# Patient Record
Sex: FEMALE | Race: WHITE | NOT HISPANIC OR LATINO | Employment: OTHER | URBAN - METROPOLITAN AREA
[De-identification: names, ages, dates, MRNs, and addresses within clinical notes are randomized per-mention and may not be internally consistent; named-entity substitution may affect disease eponyms.]

---

## 2018-01-22 ENCOUNTER — LAB REQUISITION (OUTPATIENT)
Dept: LAB | Facility: HOSPITAL | Age: 83
End: 2018-01-22
Payer: COMMERCIAL

## 2018-01-22 DIAGNOSIS — H18.12 BULLOUS KERATOPATHY OF LEFT EYE: ICD-10-CM

## 2018-01-22 PROCEDURE — 87070 CULTURE OTHR SPECIMN AEROBIC: CPT | Performed by: OPHTHALMOLOGY

## 2018-01-22 PROCEDURE — 87205 SMEAR GRAM STAIN: CPT | Performed by: OPHTHALMOLOGY

## 2018-01-22 PROCEDURE — 87102 FUNGUS ISOLATION CULTURE: CPT | Performed by: OPHTHALMOLOGY

## 2018-01-22 PROCEDURE — 87075 CULTR BACTERIA EXCEPT BLOOD: CPT | Performed by: OPHTHALMOLOGY

## 2018-01-25 LAB
BACTERIA EYE AEROBE CULT: NO GROWTH
BACTERIA SPEC ANAEROBE CULT: NO GROWTH
GRAM STN SPEC: NORMAL

## 2018-02-26 LAB — FUNGUS SPEC CULT: NORMAL

## 2018-07-20 ENCOUNTER — APPOINTMENT (INPATIENT)
Dept: ULTRASOUND IMAGING | Facility: HOSPITAL | Age: 83
DRG: 684 | End: 2018-07-20
Payer: COMMERCIAL

## 2018-07-20 ENCOUNTER — HOSPITAL ENCOUNTER (INPATIENT)
Facility: HOSPITAL | Age: 83
LOS: 7 days | Discharge: NURSING, REHAB OR LONG-TERM CARE | DRG: 684 | End: 2018-07-27
Attending: EMERGENCY MEDICINE | Admitting: INTERNAL MEDICINE
Payer: COMMERCIAL

## 2018-07-20 DIAGNOSIS — L30.4 INTERTRIGO: ICD-10-CM

## 2018-07-20 DIAGNOSIS — H40.9 GLAUCOMA: ICD-10-CM

## 2018-07-20 DIAGNOSIS — Z86.69 HISTORY OF EYE INFLAMMATION: ICD-10-CM

## 2018-07-20 DIAGNOSIS — L89.322 DECUBITUS ULCER OF LEFT BUTTOCK, STAGE 2 (HCC): ICD-10-CM

## 2018-07-20 DIAGNOSIS — R78.81 POSITIVE BLOOD CULTURE: ICD-10-CM

## 2018-07-20 DIAGNOSIS — B37.2 CANDIDAL INTERTRIGO: ICD-10-CM

## 2018-07-20 DIAGNOSIS — N17.9 ACUTE RENAL FAILURE (ARF) (HCC): Primary | ICD-10-CM

## 2018-07-20 DIAGNOSIS — E11.42 TYPE 2 DIABETES MELLITUS WITH DIABETIC POLYNEUROPATHY, WITHOUT LONG-TERM CURRENT USE OF INSULIN (HCC): ICD-10-CM

## 2018-07-20 DIAGNOSIS — L89.90 PRESSURE SORE: ICD-10-CM

## 2018-07-20 DIAGNOSIS — K59.00 CONSTIPATION: ICD-10-CM

## 2018-07-20 DIAGNOSIS — R26.2 AMBULATORY DYSFUNCTION: ICD-10-CM

## 2018-07-20 PROBLEM — I10 ESSENTIAL HYPERTENSION: Status: ACTIVE | Noted: 2018-07-20

## 2018-07-20 PROBLEM — E11.69 DIABETES MELLITUS TYPE 2 IN OBESE (HCC): Status: ACTIVE | Noted: 2018-07-20

## 2018-07-20 PROBLEM — E66.01 MORBID OBESITY (HCC): Status: ACTIVE | Noted: 2018-07-20

## 2018-07-20 PROBLEM — L89.302 DECUBITUS ULCER OF BUTTOCK, STAGE 2 (HCC): Status: ACTIVE | Noted: 2018-07-20

## 2018-07-20 PROBLEM — E66.9 DIABETES MELLITUS TYPE 2 IN OBESE (HCC): Status: ACTIVE | Noted: 2018-07-20

## 2018-07-20 LAB
ALBUMIN SERPL BCP-MCNC: 2.8 G/DL (ref 3.5–5)
ALP SERPL-CCNC: 87 U/L (ref 46–116)
ALT SERPL W P-5'-P-CCNC: 15 U/L (ref 12–78)
ANION GAP SERPL CALCULATED.3IONS-SCNC: 7 MMOL/L (ref 4–13)
AST SERPL W P-5'-P-CCNC: 13 U/L (ref 5–45)
BACTERIA UR QL AUTO: ABNORMAL /HPF
BASOPHILS # BLD AUTO: 0.06 THOUSANDS/ΜL (ref 0–0.1)
BASOPHILS NFR BLD AUTO: 1 % (ref 0–1)
BILIRUB SERPL-MCNC: 0.4 MG/DL (ref 0.2–1)
BILIRUB UR QL STRIP: NEGATIVE
BUN SERPL-MCNC: 77 MG/DL (ref 5–25)
CALCIUM SERPL-MCNC: 9.7 MG/DL (ref 8.3–10.1)
CHLORIDE SERPL-SCNC: 94 MMOL/L (ref 100–108)
CLARITY UR: ABNORMAL
CO2 SERPL-SCNC: 30 MMOL/L (ref 21–32)
COLOR UR: YELLOW
CREAT SERPL-MCNC: 3.56 MG/DL (ref 0.6–1.3)
EOSINOPHIL # BLD AUTO: 0.3 THOUSAND/ΜL (ref 0–0.61)
EOSINOPHIL NFR BLD AUTO: 3 % (ref 0–6)
ERYTHROCYTE [DISTWIDTH] IN BLOOD BY AUTOMATED COUNT: 13.2 % (ref 11.6–15.1)
GFR SERPL CREATININE-BSD FRML MDRD: 11 ML/MIN/1.73SQ M
GLUCOSE SERPL-MCNC: 215 MG/DL (ref 65–140)
GLUCOSE SERPL-MCNC: 287 MG/DL (ref 65–140)
GLUCOSE UR STRIP-MCNC: NEGATIVE MG/DL
HCT VFR BLD AUTO: 32.1 % (ref 34.8–46.1)
HGB BLD-MCNC: 10.6 G/DL (ref 11.5–15.4)
HGB UR QL STRIP.AUTO: ABNORMAL
KETONES UR STRIP-MCNC: NEGATIVE MG/DL
LEUKOCYTE ESTERASE UR QL STRIP: ABNORMAL
LYMPHOCYTES # BLD AUTO: 1.05 THOUSANDS/ΜL (ref 0.6–4.47)
LYMPHOCYTES NFR BLD AUTO: 10 % (ref 14–44)
MCH RBC QN AUTO: 30 PG (ref 26.8–34.3)
MCHC RBC AUTO-ENTMCNC: 33 G/DL (ref 31.4–37.4)
MCV RBC AUTO: 91 FL (ref 82–98)
MONOCYTES # BLD AUTO: 0.9 THOUSAND/ΜL (ref 0.17–1.22)
MONOCYTES NFR BLD AUTO: 9 % (ref 4–12)
NEUTROPHILS # BLD AUTO: 8.31 THOUSANDS/ΜL (ref 1.85–7.62)
NEUTS SEG NFR BLD AUTO: 78 % (ref 43–75)
NITRITE UR QL STRIP: POSITIVE
NON-SQ EPI CELLS URNS QL MICRO: ABNORMAL /HPF
NT-PROBNP SERPL-MCNC: 576 PG/ML
PH UR STRIP.AUTO: 6 [PH] (ref 4.5–8)
PLATELET # BLD AUTO: 404 THOUSANDS/UL (ref 149–390)
PMV BLD AUTO: 9.2 FL (ref 8.9–12.7)
POTASSIUM SERPL-SCNC: 4.6 MMOL/L (ref 3.5–5.3)
PROT SERPL-MCNC: 7.4 G/DL (ref 6.4–8.2)
PROT UR STRIP-MCNC: NEGATIVE MG/DL
RBC # BLD AUTO: 3.53 MILLION/UL (ref 3.81–5.12)
RBC #/AREA URNS AUTO: ABNORMAL /HPF
SODIUM SERPL-SCNC: 131 MMOL/L (ref 136–145)
SP GR UR STRIP.AUTO: 1.01 (ref 1–1.03)
UROBILINOGEN UR QL STRIP.AUTO: 0.2 E.U./DL
WBC # BLD AUTO: 10.62 THOUSAND/UL (ref 4.31–10.16)
WBC #/AREA URNS AUTO: ABNORMAL /HPF

## 2018-07-20 PROCEDURE — 87147 CULTURE TYPE IMMUNOLOGIC: CPT | Performed by: EMERGENCY MEDICINE

## 2018-07-20 PROCEDURE — 80053 COMPREHEN METABOLIC PANEL: CPT | Performed by: EMERGENCY MEDICINE

## 2018-07-20 PROCEDURE — 87185 SC STD ENZYME DETCJ PER NZM: CPT | Performed by: EMERGENCY MEDICINE

## 2018-07-20 PROCEDURE — 81001 URINALYSIS AUTO W/SCOPE: CPT

## 2018-07-20 PROCEDURE — 76770 US EXAM ABDO BACK WALL COMP: CPT

## 2018-07-20 PROCEDURE — 99285 EMERGENCY DEPT VISIT HI MDM: CPT

## 2018-07-20 PROCEDURE — 85025 COMPLETE CBC W/AUTO DIFF WBC: CPT | Performed by: EMERGENCY MEDICINE

## 2018-07-20 PROCEDURE — 36415 COLL VENOUS BLD VENIPUNCTURE: CPT | Performed by: EMERGENCY MEDICINE

## 2018-07-20 PROCEDURE — 82948 REAGENT STRIP/BLOOD GLUCOSE: CPT

## 2018-07-20 PROCEDURE — 87040 BLOOD CULTURE FOR BACTERIA: CPT | Performed by: EMERGENCY MEDICINE

## 2018-07-20 PROCEDURE — 83880 ASSAY OF NATRIURETIC PEPTIDE: CPT | Performed by: EMERGENCY MEDICINE

## 2018-07-20 PROCEDURE — 87186 SC STD MICRODIL/AGAR DIL: CPT | Performed by: EMERGENCY MEDICINE

## 2018-07-20 PROCEDURE — 99223 1ST HOSP IP/OBS HIGH 75: CPT | Performed by: INTERNAL MEDICINE

## 2018-07-20 PROCEDURE — 87086 URINE CULTURE/COLONY COUNT: CPT

## 2018-07-20 RX ORDER — ACETAMINOPHEN 325 MG/1
650 TABLET ORAL EVERY 6 HOURS PRN
Status: DISCONTINUED | OUTPATIENT
Start: 2018-07-20 | End: 2018-07-27 | Stop reason: HOSPADM

## 2018-07-20 RX ORDER — TIMOLOL MALEATE 5 MG/ML
1 SOLUTION/ DROPS OPHTHALMIC DAILY
Status: DISCONTINUED | OUTPATIENT
Start: 2018-07-21 | End: 2018-07-27 | Stop reason: HOSPADM

## 2018-07-20 RX ORDER — METOPROLOL TARTRATE 50 MG/1
25 TABLET, FILM COATED ORAL EVERY 12 HOURS SCHEDULED
COMMUNITY

## 2018-07-20 RX ORDER — ASPIRIN 81 MG/1
81 TABLET ORAL DAILY
COMMUNITY
End: 2018-08-10 | Stop reason: HOSPADM

## 2018-07-20 RX ORDER — ASPIRIN 81 MG/1
81 TABLET ORAL DAILY
Status: DISCONTINUED | OUTPATIENT
Start: 2018-07-21 | End: 2018-07-27 | Stop reason: HOSPADM

## 2018-07-20 RX ORDER — POTASSIUM CHLORIDE 750 MG/1
10 TABLET, EXTENDED RELEASE ORAL DAILY
COMMUNITY
End: 2018-07-27 | Stop reason: HOSPADM

## 2018-07-20 RX ORDER — LEVOTHYROXINE SODIUM 0.07 MG/1
75 TABLET ORAL
Status: DISCONTINUED | OUTPATIENT
Start: 2018-07-21 | End: 2018-07-27 | Stop reason: HOSPADM

## 2018-07-20 RX ORDER — PRAVASTATIN SODIUM 40 MG
40 TABLET ORAL
Status: DISCONTINUED | OUTPATIENT
Start: 2018-07-20 | End: 2018-07-27 | Stop reason: HOSPADM

## 2018-07-20 RX ORDER — DOCUSATE SODIUM 100 MG/1
100 CAPSULE, LIQUID FILLED ORAL 2 TIMES DAILY PRN
Status: DISCONTINUED | OUTPATIENT
Start: 2018-07-20 | End: 2018-07-27 | Stop reason: HOSPADM

## 2018-07-20 RX ORDER — NYSTATIN 100000 U/G
CREAM TOPICAL ONCE
Status: COMPLETED | OUTPATIENT
Start: 2018-07-20 | End: 2018-07-20

## 2018-07-20 RX ORDER — FUROSEMIDE 40 MG/1
40 TABLET ORAL DAILY
COMMUNITY
End: 2018-07-27 | Stop reason: HOSPADM

## 2018-07-20 RX ORDER — BRIMONIDINE TARTRATE, TIMOLOL MALEATE 2; 5 MG/ML; MG/ML
1 SOLUTION/ DROPS OPHTHALMIC EVERY 12 HOURS SCHEDULED
COMMUNITY

## 2018-07-20 RX ORDER — ONDANSETRON 2 MG/ML
4 INJECTION INTRAMUSCULAR; INTRAVENOUS EVERY 6 HOURS PRN
Status: DISCONTINUED | OUTPATIENT
Start: 2018-07-20 | End: 2018-07-27 | Stop reason: HOSPADM

## 2018-07-20 RX ORDER — PREDNISOLONE ACETATE 10 MG/ML
1 SUSPENSION/ DROPS OPHTHALMIC 2 TIMES DAILY
Status: DISCONTINUED | OUTPATIENT
Start: 2018-07-20 | End: 2018-07-27 | Stop reason: HOSPADM

## 2018-07-20 RX ORDER — NYSTATIN 100000 [USP'U]/G
POWDER TOPICAL 2 TIMES DAILY
Status: DISCONTINUED | OUTPATIENT
Start: 2018-07-20 | End: 2018-07-27 | Stop reason: HOSPADM

## 2018-07-20 RX ORDER — MELATONIN
1000 DAILY
Status: DISCONTINUED | OUTPATIENT
Start: 2018-07-21 | End: 2018-07-27 | Stop reason: HOSPADM

## 2018-07-20 RX ORDER — QUINAPRIL HCL AND HYDROCHLOROTHIAZIDE 20; 12.5 MG/1; MG/1
1 TABLET ORAL DAILY
COMMUNITY
End: 2018-07-27 | Stop reason: HOSPADM

## 2018-07-20 RX ORDER — HYDROCODONE BITARTRATE AND ACETAMINOPHEN 5; 325 MG/1; MG/1
1 TABLET ORAL 2 TIMES DAILY PRN
Status: DISCONTINUED | OUTPATIENT
Start: 2018-07-20 | End: 2018-07-27 | Stop reason: HOSPADM

## 2018-07-20 RX ORDER — HYDROCODONE BITARTRATE AND ACETAMINOPHEN 5; 300 MG/1; MG/1
0.5 TABLET ORAL 2 TIMES DAILY PRN
Status: ON HOLD | COMMUNITY
End: 2018-07-27

## 2018-07-20 RX ORDER — LEVOTHYROXINE SODIUM 0.07 MG/1
100 TABLET ORAL DAILY
COMMUNITY

## 2018-07-20 RX ORDER — PREDNISOLONE ACETATE 10 MG/ML
SUSPENSION/ DROPS OPHTHALMIC
Status: ON HOLD | COMMUNITY
End: 2018-07-27

## 2018-07-20 RX ORDER — PRAVASTATIN SODIUM 40 MG
40 TABLET ORAL DAILY
COMMUNITY
End: 2020-08-27 | Stop reason: ALTCHOICE

## 2018-07-20 RX ORDER — SODIUM CHLORIDE 9 MG/ML
75 INJECTION, SOLUTION INTRAVENOUS CONTINUOUS
Status: DISCONTINUED | OUTPATIENT
Start: 2018-07-20 | End: 2018-07-21

## 2018-07-20 RX ORDER — HEPARIN SODIUM 5000 [USP'U]/ML
5000 INJECTION, SOLUTION INTRAVENOUS; SUBCUTANEOUS EVERY 8 HOURS SCHEDULED
Status: DISCONTINUED | OUTPATIENT
Start: 2018-07-20 | End: 2018-07-24

## 2018-07-20 RX ADMIN — METOPROLOL TARTRATE 25 MG: 25 TABLET ORAL at 22:02

## 2018-07-20 RX ADMIN — SODIUM CHLORIDE 75 ML/HR: 0.9 INJECTION, SOLUTION INTRAVENOUS at 22:08

## 2018-07-20 RX ADMIN — INSULIN LISPRO 1 UNITS: 100 INJECTION, SOLUTION INTRAVENOUS; SUBCUTANEOUS at 22:03

## 2018-07-20 RX ADMIN — BIMATOPROST 1 DROP: 0.1 SOLUTION/ DROPS OPHTHALMIC at 22:01

## 2018-07-20 RX ADMIN — HEPARIN SODIUM 5000 UNITS: 5000 INJECTION, SOLUTION INTRAVENOUS; SUBCUTANEOUS at 22:02

## 2018-07-20 RX ADMIN — PREDNISOLONE ACETATE 1 DROP: 10 SUSPENSION/ DROPS OPHTHALMIC at 18:00

## 2018-07-20 RX ADMIN — PRAVASTATIN SODIUM 40 MG: 40 TABLET ORAL at 22:04

## 2018-07-20 RX ADMIN — NYSTATIN 2 APPLICATION: 100000 CREAM TOPICAL at 16:15

## 2018-07-20 RX ADMIN — NYSTATIN: 100000 POWDER TOPICAL at 22:01

## 2018-07-20 NOTE — ED PROVIDER NOTES
History  Chief Complaint   Patient presents with    Weakness - Generalized     Pt c/o right leg weakness starting a few days ago  States today she was unable to get up from her chair  Also bleeding from wound on buttocks  32-year-old female presents for evaluation with multiple complaints  Patient brought in by EMS but is accompanied by her son and daughter-in-law  Primary complaint is of a newly bleeding wound to her left buttock  According to daughter-in-law patient has had this wound for about a year and had been getting treatment from a physician for the same  However that she was under the impression that was getting better  Today noted bleeding that was concerning  Additional complaints are of progressively worsening weakness  Patient lives in an apartment which is attached to her son's home  She has been living there for 4 to 5 years according to the patient  Patient had been able to care for herself including cooking and clean but recently has had someone come in to do with the cleaning for her and has not been cooking for herself  Patient eats snacks and her family brings her a meal each night  History provided by:  Patient   used: No    Medical Problem   Location:  Left buttock  Quality:  Ulcerative lesion with bleeding  Severity:  Moderate  Onset quality:  Gradual  Duration:  1 day  Timing:  Constant  Chronicity:  Recurrent  Context:  Reported history of prior pressure sore that had been treated   Relieved by:  Nothing  Worsened by:  Nothing  Associated symptoms: fatigue and rash (skin folds - intertrigo)    Associated symptoms: no abdominal pain, no chest pain, no diarrhea, no fever, no nausea, no shortness of breath and no vomiting        Prior to Admission Medications   Prescriptions Last Dose Informant Patient Reported? Taking?    CHOLECALCIFEROL PO   Yes Yes   Sig: Take 1 tablet by mouth daily   HYDROcodone-acetaminophen (XODOL) 5-300 MG per tablet   Yes Yes Sig: Take 0 5 tablets by mouth 2 (two) times a day as needed for moderate pain   Linagliptin (TRADJENTA) 5 MG TABS   Yes Yes   Sig: Take 5 mg by mouth daily   aspirin (ECOTRIN LOW STRENGTH) 81 mg EC tablet   Yes Yes   Sig: Take 81 mg by mouth daily   bimatoprost (LUMIGAN) 0 01 % ophthalmic drops   Yes Yes   Sig: Administer 1 drop to both eyes daily at bedtime   brimonidine-timolol (COMBIGAN) 0 2-0 5 %   Yes Yes   Sig: Administer 1 drop to the right eye every 12 (twelve) hours   furosemide (LASIX) 40 mg tablet   Yes Yes   Sig: Take 40 mg by mouth daily   levothyroxine 75 mcg tablet   Yes Yes   Sig: Take 75 mcg by mouth daily   metoprolol tartrate (LOPRESSOR) 50 mg tablet   Yes Yes   Sig: Take 25 mg by mouth every 12 (twelve) hours   potassium chloride (K-DUR,KLOR-CON) 10 mEq tablet   Yes Yes   Sig: Take 10 mEq by mouth daily   pravastatin (PRAVACHOL) 40 mg tablet   Yes Yes   Sig: Take 40 mg by mouth daily   prednisoLONE acetate (PRED FORTE) 1 % ophthalmic suspension   Yes Yes   quinapril-hydrochlorothiazide (ACCURETIC) 20-12 5 MG per tablet   Yes Yes   Sig: Take 1 tablet by mouth daily      Facility-Administered Medications: None       Past Medical History:   Diagnosis Date    [facility]     Hyperlipidemia     Hypertension        History reviewed  No pertinent surgical history  History reviewed  No pertinent family history  I have reviewed and agree with the history as documented  Social History   Substance Use Topics    Smoking status: Never Smoker    Smokeless tobacco: Never Used    Alcohol use No        Review of Systems   Constitutional: Positive for fatigue  Negative for chills, diaphoresis and fever  Respiratory: Negative for shortness of breath  Cardiovascular: Positive for leg swelling  Negative for chest pain and palpitations  Gastrointestinal: Negative for abdominal pain, diarrhea, nausea and vomiting  Genitourinary: Negative for dysuria and frequency     Skin: Positive for rash (skin folds - intertrigo) and wound (left buttock)  Neurological: Positive for weakness  All other systems reviewed and are negative  Physical Exam  Physical Exam   Constitutional: She is oriented to person, place, and time  She appears well-developed and well-nourished  She appears distressed  Obese     HENT:   Head: Normocephalic and atraumatic  Eyes: EOM are normal  Pupils are equal, round, and reactive to light  Neck: Normal range of motion  No JVD present  Cardiovascular: Normal rate, regular rhythm, normal heart sounds and intact distal pulses  Exam reveals no gallop and no friction rub  No murmur heard  Pulmonary/Chest: Effort normal and breath sounds normal  No respiratory distress  She has no wheezes  She has no rales  She exhibits no tenderness  Abdominal: She exhibits no distension  There is no tenderness  Obese     Musculoskeletal: Normal range of motion  She exhibits edema (2+ Pitting Edema)  She exhibits no tenderness  Neurological: She is alert and oriented to person, place, and time  Patient is able to overcome gravity with lower extremities but fatigues quickly  Is not able to overcome slight resistance  Skin: Skin is warm and dry  Rash (intertrigo beneath abdominal folds and inframammilary folds) noted  Half dollar diameter wound to left buttock with slow venous oozing   Psychiatric: She has a normal mood and affect  Her behavior is normal  Judgment and thought content normal    Nursing note and vitals reviewed        Vital Signs  ED Triage Vitals [07/20/18 1450]   Temperature Pulse Respirations Blood Pressure SpO2   98 3 °F (36 8 °C) 80 16 116/78 98 %      Temp Source Heart Rate Source Patient Position - Orthostatic VS BP Location FiO2 (%)   Oral -- -- -- --      Pain Score       No Pain           Vitals:    07/20/18 1450   BP: 116/78   Pulse: 80       Visual Acuity      ED Medications  Medications   nystatin (MYCOSTATIN) cream (2 application Topical Given 7/20/18 1615)       Diagnostic Studies  Results Reviewed     Procedure Component Value Units Date/Time    B-type natriuretic peptide [[id number]]  (Abnormal) Collected:  07/20/18 1623    Lab Status:  Final result Specimen:  Blood from Arm, Right Updated:  07/20/18 1653     NT-proBNP 576 (H) pg/mL     Comprehensive metabolic panel [[id number]]  (Abnormal) Collected:  07/20/18 1623    Lab Status:  Final result Specimen:  Blood from Arm, Right Updated:  07/20/18 1645     Sodium 131 (L) mmol/L      Potassium 4 6 mmol/L      Chloride 94 (L) mmol/L      CO2 30 mmol/L      Anion Gap 7 mmol/L      BUN 77 (H) mg/dL      Creatinine 3 56 (H) mg/dL      Glucose 287 (H) mg/dL      Calcium 9 7 mg/dL      AST 13 U/L      ALT 15 U/L      Alkaline Phosphatase 87 U/L      Total Protein 7 4 g/dL      Albumin 2 8 (L) g/dL      Total Bilirubin 0 40 mg/dL      eGFR 11 ml/min/1 73sq m     Narrative:         National Kidney Disease Education Program recommendations are as follows:  GFR calculation is accurate only with a steady state creatinine  Chronic Kidney disease less than 60 ml/min/1 73 sq  meters  Kidney failure less than 15 ml/min/1 73 sq  meters  Blood culture #1 [[id number]] Collected:  07/20/18 1626    Lab Status:   In process Specimen:  Blood from Hand, Left Updated:  07/20/18 1631    CBC and differential [[id number]]  (Abnormal) Collected:  07/20/18 1623    Lab Status:  Final result Specimen:  Blood from Arm, Right Updated:  07/20/18 1629     WBC 10 62 (H) Thousand/uL      RBC 3 53 (L) Million/uL      Hemoglobin 10 6 (L) g/dL      Hematocrit 32 1 (L) %      MCV 91 fL      MCH 30 0 pg      MCHC 33 0 g/dL      RDW 13 2 %      MPV 9 2 fL      Platelets 791 (H) Thousands/uL      Neutrophils Relative 78 (H) %      Lymphocytes Relative 10 (L) %      Monocytes Relative 9 %      Eosinophils Relative 3 %      Basophils Relative 1 %      Neutrophils Absolute 8 31 (H) Thousands/µL      Lymphocytes Absolute 1 05 Thousands/µL Monocytes Absolute 0 90 Thousand/µL      Eosinophils Absolute 0 30 Thousand/µL      Basophils Absolute 0 06 Thousands/µL     Blood culture #2 [[id number]] Collected:  07/20/18 1623    Lab Status:   In process Specimen:  Blood from Arm, Right Updated:  07/20/18 1626                 No orders to display              Procedures  Procedures       Phone Contacts  ED Phone Contact    ED Course                               MDM  Number of Diagnoses or Management Options  Acute renal failure (ARF) ([place] [address] ): new and requires workup  Ambulatory dysfunction: new and requires workup  Intertrigo: new and requires workup  Pressure sore: new and requires workup  Diagnosis management comments: Background: 81 y o  female presents with weakness, wounds, rash    Differential DX includes but is not limited to: obvious intertrigo, obvious pressure sore, possible anemia, metabolic derangement    Plan: cbc, metabolic panel, bnp (severe lower extremity edema), treatment for intertrigo, admission for initial wound care and PT eval at minimum         Amount and/or Complexity of Data Reviewed  Clinical lab tests: ordered and reviewed    Risk of Complications, Morbidity, and/or Mortality  Presenting problems: high  Diagnostic procedures: high  Management options: high    Patient Progress  Patient progress: stable    CritCare Time    Disposition  Final diagnoses:   Acute renal failure (ARF) ([place] [address] )   Intertrigo   Pressure sore   Ambulatory dysfunction     Time reflects when diagnosis was documented in both MDM as applicable and the Disposition within this note     Time User Action Codes Description Comment    7/20/2018  5:21 PM [place] B Add [N17 9] Acute renal failure (ARF) ([place] [address] )     7/20/2018  5:21 PM Michelene Shines Add [L30 4] Intertrigo     7/20/2018  5:21 PM [place] B Add [L89 90] Pressure sore     7/20/2018  5:21 PM Michelene Shines Add [R26 2] Ambulatory dysfunction       ED Disposition     ED Disposition Condition Comment    Admit  Case was discussed with Dr Fela Denney and the patient's admission status was agreed to be Admission Status: inpatient status to the service of Dr Fela Denney   Follow-up Information    None         Patient's Medications   Discharge Prescriptions    No medications on file     No discharge procedures on file      ED Provider  Electronically Signed by           Klaus Stafford MD  07/20/18 6108

## 2018-07-20 NOTE — ED NOTES
Pt given bed bath and Nystatin Power applied to excoriated areas  Dressings applied to wound on buttocks        Tavares Arroyo RN  07/20/18 8579

## 2018-07-20 NOTE — H&P
H&P- Oksana Able 1935, 81 y o  female MRN: [id number]    Unit/Bed#: MONE Encounter: [id number]    Primary Care Provider: Janice Barrera MD   Date and time admitted to hospital: 7/20/2018  2:39 PM        * Acute kidney injury [facility])   Assessment & Plan    · Review of old records from [facility] office note in January 2018 patient with creatinine 1 6 now noted to have creatinine of 3 5  · Hold nephrotoxin medications including Lasix, Ace inhibitor, hydrochlorothiazide  · Review of [facility] records reveal Lasix was for chronic venous insufficiency no mention of heart failure   · Check renal ultrasound  · IV hydration  · Repeat labs in a m  · Consider renal evaluation of labs not improving        Decubitus ulcer of buttock, stage 2   Assessment & Plan    · Will ask surgical evaluation for wound care  · No indication for systemic antibiotics at this time        Type 2 diabetes mellitus with diabetic polyneuropathy, without long-term current use of insulin (HCC)   Assessment & Plan    No results found for: HGBA1C    No results for input(s): POCGLU in the last 72 hours  Blood Sugar Average: Last 72 hrs:    Hemoglobin A1c 6 9 in July this year  Hold Tradjenta  Fingerstick with sliding scale coverage             Essential hypertension   Assessment & Plan    · Continue beta-blocker  · Hold Ace/HCTZ        Candidal intertrigo   Assessment & Plan    · Nystatin powder        Morbid obesity ([facility] )   Assessment & Plan    · Dietary maneuvers reviewed          VTE Prophylaxis: Heparin  / sequential compression device   Code Status:  Full code  POLST: POLST form is not discussed and not completed at this time  Anticipated Length of Stay:  Patient will be admitted on an Inpatient basis with an anticipated length of stay of  greater than 2 midnights     Justification for Hospital Stay:  Patient with acute renal failure requiring IV fluid renal ultrasound possible subspecialty evaluation will in all medical likely require greater than 2 midnight stay    Total Time for Visit, including Counseling / Coordination of Care: 30 minutes  Greater than 50% of this total time spent on direct patient counseling and coordination of care  Chief Complaint:   My buttock is bleeding    History of Present Illness:    Zelda Cedeno is a 81 y o  female who presents with bleeding ulcer  Patient lives in apartment next to son apparently has had a decubitus ulcer for some time  Today was noted that the ulcer was oozing blood patient was brought to the hospital for evaluation  She denies fevers chills night sweats nausea vomiting diarrhea no chest pain or palpitations  In the ER labs revealed a elevated creatinine  Of note patient's baseline creatinine through review of [facility] records reveals creatinine of 1 6 in January this year  Creatinine ER noted to be 3 6  Review of Systems:    Review of Systems   Constitutional: Negative for chills, diaphoresis, fatigue and fever  HENT: Negative for sinus pain, sinus pressure and sore throat  Eyes: Negative for photophobia and visual disturbance  Respiratory: Negative for cough, chest tightness, shortness of breath and stridor  Cardiovascular: Positive for leg swelling (Chronic lower extremity edema)  Negative for chest pain  Gastrointestinal: Negative for abdominal pain, constipation, diarrhea, nausea and vomiting  Genitourinary: Negative for dysuria, frequency and urgency  Musculoskeletal: Positive for gait problem  Negative for back pain  Joint swelling:  patient walks with walker  Skin: Positive for wound ( positive bleeding wound to left buttock)  Neurological: Negative for seizures and syncope  Hematological: Does not bruise/bleed easily  Psychiatric/Behavioral: Negative for confusion         Past Medical and Surgical History:     Past Medical History:   Diagnosis Date    Diabetes mellitus ([facility] )     Hyperlipidemia     Hypertension        History reviewed  No pertinent surgical history  Meds/Allergies:    Prior to Admission medications    Medication Sig Start Date End Date Taking? Authorizing Provider   aspirin (ECOTRIN LOW STRENGTH) 81 mg EC tablet Take 81 mg by mouth daily   Yes Historical Provider, MD   bimatoprost (LUMIGAN) 0 01 % ophthalmic drops Administer 1 drop to both eyes daily at bedtime   Yes Historical Provider, MD   brimonidine-timolol (COMBIGAN) 0 2-0 5 % Administer 1 drop to the right eye every 12 (twelve) hours   Yes Historical Provider, MD   CHOLECALCIFEROL PO Take 1 tablet by mouth daily   Yes Historical Provider, MD   furosemide (LASIX) 40 mg tablet Take 40 mg by mouth daily   Yes Historical Provider, MD   HYDROcodone-acetaminophen (XODOL) 5-300 MG per tablet Take 0 5 tablets by mouth 2 (two) times a day as needed for moderate pain   Yes Historical Provider, MD   levothyroxine 75 mcg tablet Take 75 mcg by mouth daily   Yes Historical Provider, MD   Linagliptin (TRADJENTA) 5 MG TABS Take 5 mg by mouth daily   Yes Historical Provider, MD   metoprolol tartrate (LOPRESSOR) 50 mg tablet Take 25 mg by mouth every 12 (twelve) hours   Yes Historical Provider, MD   potassium chloride (K-DUR,KLOR-CON) 10 mEq tablet Take 10 mEq by mouth daily   Yes Historical Provider, MD   pravastatin (PRAVACHOL) 40 mg tablet Take 40 mg by mouth daily   Yes Historical Provider, MD   prednisoLONE acetate (PRED FORTE) 1 % ophthalmic suspension    Yes Historical Provider, MD   quinapril-hydrochlorothiazide (ACCURETIC) 20-12 5 MG per tablet Take 1 tablet by mouth daily   Yes Historical Provider, MD     I have been unable to obtain / verify an up to date medication list despite all reasonable attempts  Medication history obtained from outside records    Allergies: No Known Allergies    Social History:     Marital Status:     Occupation:   Patient Pre-hospital Living Situation:   Patient Pre-hospital Level of Mobility:   Patient Pre-hospital Diet Restrictions: Substance Use History:   History   Alcohol Use No     History   Smoking Status    Never Smoker   Smokeless Tobacco    Never Used     History   Drug Use No       Family History:    non-contributory    Physical Exam:     Vitals:   Blood Pressure: 116/78 (07/20/18 1450)  Pulse: 80 (07/20/18 1450)  Temperature: 98 3 °F (36 8 °C) (07/20/18 1450)  Temp Source: Oral (07/20/18 1450)  Respirations: 16 (07/20/18 1450)  Weight - Scale: 103 kg (226 lb 3 1 oz) (07/20/18 1450)  SpO2: 98 % (07/20/18 1450)    Physical Exam   Constitutional: No distress  HENT:   Head: Normocephalic and atraumatic  Mouth/Throat: No oropharyngeal exudate  Eyes: Pupils are equal, round, and reactive to light  No scleral icterus  Neck: No JVD present  Cardiovascular: Normal rate  Exam reveals no gallop and no friction rub  No murmur heard  Pulmonary/Chest: Effort normal and breath sounds normal  No respiratory distress  She has no wheezes  She has no rales  Abdominal: Soft  She exhibits no distension  There is no tenderness  There is no rebound and no guarding  Musculoskeletal: She exhibits edema (1+ edema bilateral lower extremities)  Lymphadenopathy:     She has no cervical adenopathy  Neurological: She is alert  Skin: Rash (Positive intertrigo skin folds) noted  She is not diaphoretic  Additional Data:     Lab Results: I have personally reviewed pertinent reports          Results from last 7 days  Lab Units 07/20/18  1623   WBC Thousand/uL 10 62*   HEMOGLOBIN g/dL 10 6*   HEMATOCRIT % 32 1*   PLATELETS Thousands/uL 404*   NEUTROS PCT % 78*   LYMPHS PCT % 10*   MONOS PCT % 9   EOS PCT % 3       Results from last 7 days  Lab Units 07/20/18  1623   SODIUM mmol/L 131*   POTASSIUM mmol/L 4 6   CHLORIDE mmol/L 94*   CO2 mmol/L 30   BUN mg/dL 77*   CREATININE mg/dL 3 56*   CALCIUM mg/dL 9 7   TOTAL PROTEIN g/dL 7 4   BILIRUBIN TOTAL mg/dL 0 40   ALK PHOS U/L 87   ALT U/L 15   AST U/L 13   GLUCOSE RANDOM mg/dL 287* Imaging: I have personally reviewed pertinent reports  No orders to display       EKG, Pathology, and Other Studies Reviewed on Admission:   · EKG:  Normal sinus rhythm    Allscripts / Epic Records Reviewed: No     ** Please Note: This note has been constructed using a voice recognition system   **

## 2018-07-20 NOTE — ASSESSMENT & PLAN NOTE
No results found for: HGBA1C    No results for input(s): POCGLU in the last 72 hours      Blood Sugar Average: Last 72 hrs:    Hemoglobin A1c 6 9 in July this year  Hold [place]  Fingerstick with sliding scale coverage

## 2018-07-20 NOTE — ASSESSMENT & PLAN NOTE
· Will ask surgical evaluation for wound care    · No indication for systemic antibiotics at this time

## 2018-07-20 NOTE — CONSULTS
Consultation -General Surgery  Yuniel Story 81 y o  female MRN: [id number]  Unit/Bed#: -01 Encounter: [id number]        Inpatient consult to Acute Care Surgery  Consult performed by: Lilli Vázquez ordered by: Hussain Hubbard          ASSESSMENT/PLAN:    81-year-old female presenting with stage II left buttock ulcer  -duoderm to left buttock  May change when soiled by nursing    - Q2 roll      BERTHA  - per primary   - CR 3 5 today    Problem List     * (Principal)Acute kidney injury ([facility] [address] )    Type 2 diabetes mellitus with diabetic polyneuropathy, without long-term current use of insulin (HCC)    No results found for: HGBA1C    No results for input(s): POCGLU in the last 72 hours  Blood Sugar Average: Last 72 hrs:          Decubitus ulcer of buttock, stage 2    Essential hypertension    Morbid obesity (HCC)    Candidal intertrigo          Reason for Consult / Principal Problem: stage 2 left buttock ulcer    HPI: Yuniel Story is a 81y o  year old female with PMH of DM, HTN, hyperlipidemia who presents with Acute kidney injury ([facility] [address] )  Pt states she noted bleeding from her buttock wound today  She has had wound for years but it has never bled before  States she sits a lot at home  Son lives beside her and checks on her daily  Denies pain to the area, fever, chills  Review of Systems   Constitutional: Negative for chills and fever  Cardiovascular: Negative for palpitations  Gastrointestinal: Negative for abdominal pain  Skin: Positive for wound (left buttock  )  Psychiatric/Behavioral: Negative for behavioral problems, confusion, decreased concentration and dysphoric mood  Historical Information   Past Medical History:   Diagnosis Date    Diabetes mellitus ([facility] [address] )     Hyperlipidemia     Hypertension      History reviewed  No pertinent surgical history    Social History   History   Alcohol Use No     History   Drug Use No     History   Smoking Status    Never Smoker   Smokeless Tobacco    Never Used     History reviewed  No pertinent family history      Meds/Allergies     Prescriptions Prior to Admission   Medication    aspirin (ECOTRIN LOW STRENGTH) 81 mg EC tablet    bimatoprost (LUMIGAN) 0 01 % ophthalmic drops    brimonidine-timolol (COMBIGAN) 0 2-0 5 %    CHOLECALCIFEROL PO    furosemide (LASIX) 40 mg tablet    HYDROcodone-acetaminophen (XODOL) 5-300 MG per tablet    levothyroxine 75 mcg tablet    Linagliptin (TRADJENTA) 5 MG TABS    metoprolol tartrate (LOPRESSOR) 50 mg tablet    potassium chloride (K-DUR,KLOR-CON) 10 mEq tablet    pravastatin (PRAVACHOL) 40 mg tablet    prednisoLONE acetate (PRED FORTE) 1 % ophthalmic suspension    quinapril-hydrochlorothiazide (ACCURETIC) 20-12 5 MG per tablet     Current Facility-Administered Medications   Medication Dose Route Frequency    acetaminophen (TYLENOL) tablet 650 mg  650 mg Oral Q6H PRN    [START ON 7/21/2018] aspirin (ECOTRIN LOW STRENGTH) EC tablet 81 mg  81 mg Oral Daily    bimatoprost (LUMIGAN) 0 01 % ophthalmic solution 1 drop  1 drop Both Eyes HS    [START ON 7/21/2018] Cholecalciferol TABS   Oral Daily    docusate sodium (COLACE) capsule 100 mg  100 mg Oral BID PRN    heparin (porcine) subcutaneous injection 5,000 Units  5,000 Units Subcutaneous Q8H [address]    HYDROcodone-acetaminophen (NORCO) 5-325 mg per tablet 1 tablet  1 tablet Oral BID PRN    insulin lispro (HumaLOG) 100 units/mL subcutaneous injection 1-5 Units  1-5 Units Subcutaneous TID AC    insulin lispro (HumaLOG) 100 units/mL subcutaneous injection 1-5 Units  1-5 Units Subcutaneous HS    [START ON 7/21/2018] levothyroxine tablet 75 mcg  75 mcg Oral Early Morning    metoprolol tartrate (LOPRESSOR) tablet 25 mg  25 mg Oral Q12H SABRINA    nystatin (MYCOSTATIN) powder   Topical BID    ondansetron (ZOFRAN) injection 4 mg  4 mg Intravenous Q6H PRN    pravastatin (PRAVACHOL) tablet 40 mg  40 mg Oral Daily With Dinner    prednisoLONE acetate (PRED FORTE) 1 % ophthalmic suspension 1 drop  1 drop Both Eyes BID    sodium chloride 0 9 % infusion  75 mL/hr Intravenous Continuous    [START ON 7/21/2018] timolol (TIMOPTIC) 0 5 % ophthalmic solution 1 drop  1 drop Right Eye Daily       No Known Allergies    Objective     Blood pressure 110/55, pulse 71, temperature 98 6 °F (37 °C), temperature source Oral, resp  rate 18, height 5' 2" (1 575 m), weight 95 9 kg (211 lb 6 7 oz), SpO2 97 %  No intake or output data in the 24 hours ending 07/20/18 1850    PHYSICAL EXAM  General appearance: alert and oriented, in no acute distress  Skin: 4cm stage 2 ulcer left buttock, no active bleeding   No erythema  Heart: regular rate and rhythm, S1, S2 normal, no murmur, click, rub or gallop  Lungs: clear to auscultation bilaterally  Abdomen: soft, non-tender; bowel sounds normal; no masses,  no organomegaly  Rectal: external hemorrhoid present  Neurological: aaox3, speech normal    Lab Results:   Admission on 07/20/2018   Component Date Value    WBC 07/20/2018 10 62*    RBC 07/20/2018 3 53*    Hemoglobin 07/20/2018 10 6*    Hematocrit 07/20/2018 32 1*    MCV 07/20/2018 91     MCH 07/20/2018 30 0     MCHC 07/20/2018 33 0     RDW 07/20/2018 13 2     MPV 07/20/2018 9 2     Platelets 03/50/0826 404*    Neutrophils Relative 07/20/2018 78*    Lymphocytes Relative 07/20/2018 10*    Monocytes Relative 07/20/2018 9     Eosinophils Relative 07/20/2018 3     Basophils Relative 07/20/2018 1     Neutrophils Absolute 07/20/2018 8 31*    Lymphocytes Absolute 07/20/2018 1 05     Monocytes Absolute 07/20/2018 0 90     Eosinophils Absolute 07/20/2018 0 30     Basophils Absolute 07/20/2018 0 06     Sodium 07/20/2018 131*    Potassium 07/20/2018 4 6     Chloride 07/20/2018 94*    CO2 07/20/2018 30     Anion Gap 07/20/2018 7     BUN 07/20/2018 77*    Creatinine 07/20/2018 3 56*    Glucose 07/20/2018 287*    Calcium 07/20/2018 9 7     AST 07/20/2018 13     ALT 07/20/2018 15     Alkaline Phosphatase 07/20/2018 87     Total Protein 07/20/2018 7 4     Albumin 07/20/2018 2 8*    Total Bilirubin 07/20/2018 0 40     eGFR 07/20/2018 11     NT-proBNP 07/20/2018 576*    Color, UA 07/20/2018 Yellow     Clarity, UA 07/20/2018 Cloudy     pH, UA 07/20/2018 6 0     Leukocytes, UA 07/20/2018 Large*    Nitrite, UA 07/20/2018 Positive*    Protein, UA 07/20/2018 Negative     Glucose, UA 07/20/2018 Negative     Ketones, UA 07/20/2018 Negative     Urobilinogen, UA 07/20/2018 0 2     Bilirubin, UA 07/20/2018 Negative     Blood, UA 07/20/2018 Small*    Specific [place], UA 07/20/2018 1 010     RBC, UA 07/20/2018 0-5     WBC, UA 07/20/2018 30-50*    Epithelial Cells 07/20/2018 Occasional     Bacteria, UA 07/20/2018 Innumerable*           Counseling / Coordination of Care  Total time spent today  15 minutes  Greater than 50% of total time was spent with the patient and / or family counseling and / or coordination of care       Phoebe Haq PA-C

## 2018-07-20 NOTE — ASSESSMENT & PLAN NOTE
· Review of old records from [facility] office note in January 2018 patient with creatinine 1 6 now noted to have creatinine of 3 5  · Hold nephrotoxin medications including Lasix, Ace inhibitor, hydrochlorothiazide  · Review of [facility] records reveal Lasix was for chronic venous insufficiency no mention of heart failure   · Check renal ultrasound  · IV hydration  · Repeat labs in a m    · Consider renal evaluation of labs not improving

## 2018-07-21 ENCOUNTER — APPOINTMENT (INPATIENT)
Dept: ULTRASOUND IMAGING | Facility: HOSPITAL | Age: 83
DRG: 684 | End: 2018-07-21
Payer: COMMERCIAL

## 2018-07-21 PROBLEM — R26.2 AMBULATORY DYSFUNCTION: Status: ACTIVE | Noted: 2018-07-21

## 2018-07-21 PROBLEM — D64.9 ANEMIA: Status: ACTIVE | Noted: 2018-07-21

## 2018-07-21 LAB
ANION GAP SERPL CALCULATED.3IONS-SCNC: 9 MMOL/L (ref 4–13)
BACTERIA UR CULT: NORMAL
BUN SERPL-MCNC: 66 MG/DL (ref 5–25)
CALCIUM SERPL-MCNC: 8.1 MG/DL (ref 8.3–10.1)
CHLORIDE SERPL-SCNC: 103 MMOL/L (ref 100–108)
CO2 SERPL-SCNC: 26 MMOL/L (ref 21–32)
CREAT SERPL-MCNC: 2.79 MG/DL (ref 0.6–1.3)
ERYTHROCYTE [DISTWIDTH] IN BLOOD BY AUTOMATED COUNT: 13 % (ref 11.6–15.1)
GFR SERPL CREATININE-BSD FRML MDRD: 15 ML/MIN/1.73SQ M
GLUCOSE SERPL-MCNC: 117 MG/DL (ref 65–140)
GLUCOSE SERPL-MCNC: 127 MG/DL (ref 65–140)
GLUCOSE SERPL-MCNC: 207 MG/DL (ref 65–140)
GLUCOSE SERPL-MCNC: 216 MG/DL (ref 65–140)
GLUCOSE SERPL-MCNC: 257 MG/DL (ref 65–140)
HCT VFR BLD AUTO: 25.7 % (ref 34.8–46.1)
HGB BLD-MCNC: 8 G/DL (ref 11.5–15.4)
MCH RBC QN AUTO: 28.8 PG (ref 26.8–34.3)
MCHC RBC AUTO-ENTMCNC: 31.1 G/DL (ref 31.4–37.4)
MCV RBC AUTO: 92 FL (ref 82–98)
PLATELET # BLD AUTO: 327 THOUSANDS/UL (ref 149–390)
PMV BLD AUTO: 9.6 FL (ref 8.9–12.7)
POTASSIUM SERPL-SCNC: 3.5 MMOL/L (ref 3.5–5.3)
RBC # BLD AUTO: 2.78 MILLION/UL (ref 3.81–5.12)
SODIUM SERPL-SCNC: 138 MMOL/L (ref 136–145)
WBC # BLD AUTO: 8.43 THOUSAND/UL (ref 4.31–10.16)

## 2018-07-21 PROCEDURE — 85027 COMPLETE CBC AUTOMATED: CPT | Performed by: INTERNAL MEDICINE

## 2018-07-21 PROCEDURE — 93970 EXTREMITY STUDY: CPT

## 2018-07-21 PROCEDURE — 82948 REAGENT STRIP/BLOOD GLUCOSE: CPT

## 2018-07-21 PROCEDURE — 80048 BASIC METABOLIC PNL TOTAL CA: CPT | Performed by: INTERNAL MEDICINE

## 2018-07-21 PROCEDURE — 99232 SBSQ HOSP IP/OBS MODERATE 35: CPT | Performed by: PHYSICIAN ASSISTANT

## 2018-07-21 RX ORDER — SODIUM CHLORIDE 9 MG/ML
75 INJECTION, SOLUTION INTRAVENOUS ONCE
Status: COMPLETED | OUTPATIENT
Start: 2018-07-21 | End: 2018-07-21

## 2018-07-21 RX ADMIN — INSULIN LISPRO 1 UNITS: 100 INJECTION, SOLUTION INTRAVENOUS; SUBCUTANEOUS at 12:31

## 2018-07-21 RX ADMIN — HEPARIN SODIUM 5000 UNITS: 5000 INJECTION, SOLUTION INTRAVENOUS; SUBCUTANEOUS at 05:21

## 2018-07-21 RX ADMIN — METOPROLOL TARTRATE 25 MG: 25 TABLET ORAL at 21:37

## 2018-07-21 RX ADMIN — INSULIN LISPRO 1 UNITS: 100 INJECTION, SOLUTION INTRAVENOUS; SUBCUTANEOUS at 21:39

## 2018-07-21 RX ADMIN — ASPIRIN 81 MG: 81 TABLET, COATED ORAL at 08:35

## 2018-07-21 RX ADMIN — SODIUM CHLORIDE 75 ML/HR: 0.9 INJECTION, SOLUTION INTRAVENOUS at 12:34

## 2018-07-21 RX ADMIN — METOPROLOL TARTRATE 25 MG: 25 TABLET ORAL at 08:35

## 2018-07-21 RX ADMIN — VITAMIN D, TAB 1000IU (100/BT) 1000 UNITS: 25 TAB at 08:35

## 2018-07-21 RX ADMIN — HEPARIN SODIUM 5000 UNITS: 5000 INJECTION, SOLUTION INTRAVENOUS; SUBCUTANEOUS at 14:45

## 2018-07-21 RX ADMIN — NYSTATIN: 100000 POWDER TOPICAL at 18:00

## 2018-07-21 RX ADMIN — PREDNISOLONE ACETATE 1 DROP: 10 SUSPENSION/ DROPS OPHTHALMIC at 18:00

## 2018-07-21 RX ADMIN — HEPARIN SODIUM 5000 UNITS: 5000 INJECTION, SOLUTION INTRAVENOUS; SUBCUTANEOUS at 21:38

## 2018-07-21 RX ADMIN — TIMOLOL MALEATE 1 DROP: 5 SOLUTION/ DROPS OPHTHALMIC at 08:35

## 2018-07-21 RX ADMIN — PRAVASTATIN SODIUM 40 MG: 40 TABLET ORAL at 19:26

## 2018-07-21 RX ADMIN — NYSTATIN: 100000 POWDER TOPICAL at 08:35

## 2018-07-21 RX ADMIN — PREDNISOLONE ACETATE 1 DROP: 10 SUSPENSION/ DROPS OPHTHALMIC at 08:35

## 2018-07-21 RX ADMIN — BIMATOPROST 1 DROP: 0.1 SOLUTION/ DROPS OPHTHALMIC at 21:38

## 2018-07-21 RX ADMIN — LEVOTHYROXINE SODIUM 75 MCG: 75 TABLET ORAL at 05:21

## 2018-07-21 NOTE — PLAN OF CARE
METABOLIC, FLUID AND ELECTROLYTES - ADULT     Fluid balance maintained Progressing     Glucose maintained within target range Progressing        PAIN - ADULT     Verbalizes/displays adequate comfort level or baseline comfort level Progressing        Potential for Falls     Patient will remain free of falls Progressing        SKIN/TISSUE INTEGRITY - ADULT     Skin integrity remains intact Progressing

## 2018-07-21 NOTE — CASE MANAGEMENT
Thank you,  Cayden Aqq  [facility] Utilization Review Department  Phone: [phone]; Fax [phone]  ATTENTION: The Network Utilization Review Department is now centralized for our 9 Facilities  Make a note that we have a new phone and fax numbers for our Department  Please call with any questions or concerns to [phone] and carefully follow the prompts so that you are directed to the right person  All voicemails are confidential  Fax any determinations, approvals, denials, and requests for initial or continue stay review clinical to [phone]  Due to HIGH CALL volume, it would be easier if you could please send faxed requests to expedite your requests and in part, help us provide discharge notifications faster  Initial Clinical Review    Admission: Date/Time/Statement:   7/20/18 AT 1722     Orders Placed This Encounter   Procedures    Inpatient Admission (expected length of stay for this patient is greater than two midnights)     Standing Status:   Standing     Number of Occurrences:   1     Order Specific Question:   Admitting Physician     Answer:   Deena Richardson [[id number]]     Order Specific Question:   Level of Care     Answer:   Med Surg [[id number]]     Order Specific Question:   Estimated length of stay     Answer:   More than 2 Midnights     Order Specific Question:   Certification     Answer:   I certify that inpatient services are medically necessary for this patient for a duration of greater than two midnights  See H&P and MD Progress Notes for additional information about the patient's course of treatment         ED: Date/Time/Mode of Arrival:   ED Arrival Information     Expected Arrival Acuity Means of Arrival Escorted By Service Admission Type    - 7/20/2018 14:39 Urgent Ambulance [facility]ulance General Medicine Urgent    Arrival Complaint    -          Chief Complaint:   Chief Complaint   Patient presents with    Weakness - Generalized     Pt c/o right leg weakness starting a few days ago  States today she was unable to get up from her chair  Also bleeding from wound on buttocks  Chief Complaint:   My buttock is bleeding     History of Present Illness:   Caterina Cohen is a 81 y o  female who presents with bleeding ulcer  Patient lives in apartment next to son apparently has had a decubitus ulcer for some time  Today was noted that the ulcer was oozing blood patient was brought to the hospital for evaluation  She denies fevers chills night sweats nausea vomiting diarrhea no chest pain or palpitations  In the ER labs revealed a elevated creatinine  Of note patient's baseline creatinine through review of [place] records reveals creatinine of 1 6 in January this year  Creatinine ER noted to be 3 6        Review of Systems:   Constitutional: Negative for chills, diaphoresis, fatigue and fever  Respiratory: Negative for cough, chest tightness, shortness of breath and stridor  Cardiovascular: Positive for leg swelling (Chronic lower extremity edema)  Negative for chest pain  Musculoskeletal: Positive for gait problem  Patient walks with walker  Skin: Positive for wound ( positive bleeding wound to left buttock)  Neurological: Negative for seizures and syncope  Physical Exam   Constitutional: No distress  Cardiovascular: Normal rate  Exam reveals no gallop and no friction rub  No murmur heard  Pulmonary/Chest: Effort normal and breath sounds normal  No respiratory distress  She has no wheezes  She has no rales  Musculoskeletal: She exhibits edema (1+ edema bilateral lower extremities)         ED Vital Signs:   ED Triage Vitals   Temperature Pulse Respirations Blood Pressure SpO2   07/20/18 1450 07/20/18 1450 07/20/18 1450 07/20/18 1450 07/20/18 1450   98 3 °F (36 8 °C) 80 16 116/78 98 %      Temp Source Heart Rate Source Patient Position - Orthostatic VS BP Location FiO2 (%)   07/20/18 1450 -- 07/20/18 2219 07/20/18 1806 -- Oral  Lying Left arm       Pain Score       07/20/18 1450       No Pain        Wt Readings from Last 1 Encounters:   07/20/18 95 9 kg (211 lb 6 7 oz)      07/20 0701  07/21 0700 07/21 0701 07/21 1644  Most Recent    Temperature (°F) 97 798 6 97 598 1  97 5 (36 4)    Pulse 7180 7071  71    Respirations 1618 1618  16    Blood Pressure 99/51116/78 111/56133/58  133/58    SpO2 (%) 9799 9599  95        LABS/Diagnostic Test Results:   Results from last 7 days  Lab Units 07/20/18  1623   WBC Thousand/uL 10 62*   HEMOGLOBIN g/dL 10 6*   HEMATOCRIT % 32 1*   PLATELETS Thousands/uL 404*   NEUTROS PCT % 78*   LYMPHS PCT % 10*   MONOS PCT % 9   EOS PCT % 3       Results from last 7 days  Lab Units 07/20/18  1623   SODIUM mmol/L 131*   POTASSIUM mmol/L 4 6   CHLORIDE mmol/L 94*   CO2 mmol/L 30   BUN mg/dL 77*   CREATININE mg/dL 3 56*   CALCIUM mg/dL 9 7   TOTAL PROTEIN g/dL 7 4   BILIRUBIN TOTAL mg/dL 0 40   ALK PHOS U/L 87   ALT U/L 15   AST U/L 13   GLUCOSE RANDOM mg/dL 287*     B-type natriuretic peptide [[id number]] (Abnormal) Collected: 07/20/18 1623   Lab Status: Final result Specimen: Blood from Arm, Right Updated: 07/20/18 1653    NT-proBNP 576 (H) <450 pg/mL       POC Glucose 215 (H) 65 - 140 mg/dl      Urine Macroscopic [[id number]] (Abnormal) Collected: 07/20/18 1751   Lab Status: Final result Specimen: Urine Updated: 07/20/18 1743    Color, UA Yellow    Clarity, UA Cloudy    pH, UA 6 0 4 5 - 8 0     Leukocytes, UA Large (A) Negative     Nitrite, UA Positive (A) Negative     Protein, UA Negative Negative mg/dl     Glucose, UA Negative Negative mg/dl     Ketones, UA Negative Negative mg/dl     Urobilinogen, UA 0 2 0 2, 1 0 E U /dl E U /dl     Bilirubin, UA Negative Negative     Blood, UA Small (A) Negative     Specific Gravity, UA 1 010 1 003 - 1 030          Urine Microscopic [[id number]] (Abnormal) Collected: 07/20/18 1751   Lab Status: Final result Specimen: Urine from Urine, Clean Catch Updated: 07/20/18 1827    RBC, UA 0-5 None Seen, 0-5 /hpf     WBC, UA 30-50 (A) None Seen, 0-5, 5-55, 5-65 /hpf     Epithelial Cells Occasional None Seen, Occasional /hpf     Bacteria, UA Innumerable (A)       Microbiology Results (last 21 days)     Procedure Component Value - Date/Time   Urine culture [[id number]] Collected: 07/20/18 1751   Lab Status: In process Specimen: Urine from Urine, Clean Catch Updated: 07/20/18 1827   Blood culture #1 [[id number]] Collected: 07/20/18 1626   Lab Status: In process Specimen: Blood from Hand, Left Updated: 07/20/18 1631   Blood culture #2 [[id number]] Collected: 07/20/18 1623   Lab Status: Preliminary result Specimen: Blood from Arm, Right Updated: 07/21/18 1550    Gram Stain Result Gram positive cocci in clusters       RENAL ULTRASOUND -  Atrophic left kidney with renal cortical thinning   Echogenic renal cortices bilaterally consistent with medical renal disease   No hydronephrosis      ED Treatment:   Medication Administration from 07/20/2018 1439 to 07/20/2018 1801       Date/Time Order Dose Route Action Action by Comments     07/20/2018 1615 nystatin (MYCOSTATIN) cream 2 application Topical Given Katie Magana RN      07/20/2018 1800 prednisoLONE acetate (PRED FORTE) 1 % ophthalmic suspension 1 drop 1 drop Both Eyes Given Saba Posada RN           Past Medical/Surgical History:   Resolved Ambulatory Problems     Diagnosis Date Noted    No Resolved Ambulatory Problems     Past Medical History:   Diagnosis Date    Diabetes mellitus ([facility] [address] )     Hyperlipidemia     Hypertension        Admitting Diagnosis: Intertrigo [L30 4]  Weakness [R53 1]  Acute renal failure (ARF) (HCC) [N17 9]  Pressure sore [L89 90]  Decubitus ulcer of left buttock, stage 2 [L89 322]  Ambulatory dysfunction [R26 2]    Age/Sex: 81 y o  female      Assessment/Plan:   * Acute kidney injury ([facility] [address] )   Assessment & Plan     · Review of old records from [facility] office note in January 2018 patient with creatinine 1 6 now noted to have creatinine of 3 5  · Hold nephrotoxin medications including Lasix, Ace inhibitor, hydrochlorothiazide  · Review of [facility] records reveal Lasix was for chronic venous insufficiency no mention of heart failure   · Check renal ultrasound  · IV hydration  · Repeat labs in a m  · Consider renal evaluation of labs not improving          Decubitus ulcer of buttock, stage 2   Assessment & Plan     · Will ask surgical evaluation for wound care  · No indication for systemic antibiotics at this time          Type 2 diabetes mellitus with diabetic polyneuropathy, without long-term current use of insulin (HCC)   Assessment & Plan     No results found for: HGBA1C     No results for input(s): POCGLU in the last 72 hours      Blood Sugar Average: Last 72 hrs:     Hemoglobin A1c 6 9 in July this year  Hold Tradjenta  Fingerstick with sliding scale coverage                 Essential hypertension   Assessment & Plan     · Continue beta-blocker  · Hold Ace/HCTZ          Candidal intertrigo   Assessment & Plan     · Nystatin powder          Morbid obesity (HCC)   Assessment & Plan     · Dietary maneuvers reviewed             VTE Prophylaxis: Heparin  / sequential compression device     Code Status:  Full code     Anticipated Length of Stay:  Patient will be admitted on an Inpatient basis with an anticipated length of stay of  greater than 2 midnights       Justification for Hospital Stay:  Patient with acute renal failure requiring IV fluid renal ultrasound possible subspecialty evaluation         Admission Orders:  7/20/18 AT 1722   ADMIT INPATIENT  VS Q4HRS    Turn q2hrs  Up with Assistance  SCD    Diet Nura/CHO Controlled; Consistent Carbohydrate Diet Level 2 (5 carb servings/75 grams CHO/meal)      Continuous IV Infusions:    IVF sodium chloride 0 9 % infusion at 75 cc/hr    Scheduled Meds:   Current Facility-Administered Medications:  acetaminophen 650 mg Oral Q6H PRN Budd Leyden, MD   aspirin 81 mg Oral Daily Pernell Perkins MD   bimatoprost 1 drop Both Eyes HS Pernell Perkins MD   cholecalciferol 1,000 Units Oral Daily Pernell Perkins MD   docusate sodium 100 mg Oral BID PRN Pernell Perkins MD   heparin (porcine) 5,000 Units Subcutaneous Q8H [facility] Pernell Perkins MD   HYDROcodone-acetaminophen 1 tablet Oral BID PRN Nate Ferrer MD   insulin lispro 1-5 Units Subcutaneous TID Star Ferrer MD   insulin lispro 1-5 Units Subcutaneous HS Pernell Perkins MD   levothyroxine 75 mcg Oral Early Morning Pernell Perkins MD   metoprolol tartrate 25 mg Oral Q12H [facility] Pernell Perkins MD   nystatin  Topical BID Nate Ferrer MD   ondansetron 4 mg Intravenous Q6H PRN Pernell Perkins MD   pravastatin 40 mg Oral Daily With Travis Shelton MD   prednisoLONE acetate 1 drop Both Eyes BID Pernell Perkins MD   timolol 1 drop Right Eye Daily Pernell Perkins MD       PRN Meds:     acetaminophen    docusate sodium    HYDROcodone-acetaminophen    ondansetron    CONSULT RENAL    PT EVAL    OT EVAL

## 2018-07-21 NOTE — ASSESSMENT & PLAN NOTE
No results found for: HGBA1C    Recent Labs      07/20/18   2121  07/21/18   0725  07/21/18   1124   POCGLU  215*  127  207*     Blood Sugar Average: Last 72 hrs:  · Hemoglobin A1c 6 9 in July this year  · Hold Tradjenta  · SSI, accucheks   (P) 183

## 2018-07-21 NOTE — ASSESSMENT & PLAN NOTE
· Reviewed [facility] records, 1 4-1 6  Suspect stage 3 CKD  Creatinine POA 3 5  Now 2 79   · Hold nephrotoxin medications including Lasix, Ace inhibitor, hydrochlorothiazide  · Per PCP note, Lasix 40mg/d was for chronic venous insufficiency, no mention of heart failure   · Renal ultrasound with medical renal disease   · UA no proteinuria   · Continue IVF for 1 more L then d/c   · Repeat labs in a m    · Consider renal c/s if no improvement

## 2018-07-21 NOTE — ASSESSMENT & PLAN NOTE
· Appreciate surgery input  · Duoderm q72 hrs, frequent repositioning, pillows for offloading   · No indication for systemic antibiotics at this time  · Serial exams

## 2018-07-21 NOTE — PROGRESS NOTES
Progress Note - Marilyn Lee 1935, 81 y o  female MRN: [id number]  Unit/Bed#: -01 Encounter: [id number] DOS: 7/21/18  Primary Care Provider: Jose Armando Hobson MD   Date and time admitted to hospital: 7/20/2018  2:39 PM    * Acute kidney injury [facility])   Assessment & Plan    · Reviewed [facility] records, 1 4-1 6  Suspect stage 3 CKD  Creatinine POA 3 5  Now 2 79   · Hold nephrotoxin medications including Lasix, Ace inhibitor, hydrochlorothiazide  · Per PCP note, Lasix 40mg/d was for chronic venous insufficiency, no mention of heart failure   · Renal ultrasound with medical renal disease   · UA no proteinuria   · Continue IVF for 1 more L then d/c   · Repeat labs in a m    · Consider renal c/s if no improvement         Anemia   Assessment & Plan    · Baseline hgb 11-12, 8 today  · Suspect dilutional and 2/2 bleeding decub ulcer   · Monitor H&H        Ambulatory dysfunction   Assessment & Plan    · PT, OT consulted         Candidal intertrigo   Assessment & Plan    · Nystatin powder  · Wound care c/s         Morbid obesity ([facility] [address] )   Assessment & Plan    · Recommend diet and lifestyle modifications         Essential hypertension   Assessment & Plan    · Continue beta-blocker  · Hold Ace/HCTZ        Decubitus ulcer of buttock, stage 2   Assessment & Plan    · Appreciate surgery input  · Duoderm q72 hrs, frequent repositioning, pillows for offloading   · No indication for systemic antibiotics at this time  · Serial exams         Type 2 diabetes mellitus with diabetic polyneuropathy, without long-term current use of insulin ([facility] [address] )   Assessment & Plan    No results found for: HGBA1C    Recent Labs      07/20/18   2121  07/21/18   0725  07/21/18   1124   POCGLU  215*  127  207*     Blood Sugar Average: Last 72 hrs:  · Hemoglobin A1c 6 9 in July this year  · Hold Tradjenta  · SSI, accucheks   (P) 183          VTE Pharmacologic Prophylaxis:   Pharmacologic: Heparin  Mechanical VTE Prophylaxis in Place: Yes    Patient Centered Rounds: I have performed bedside rounds with nursing staff today  Discussions with Specialists or Other Care Team Provider: nursing    Education and Discussions with Family / Patient: patient, family including brother and dtr in law at bedside     Time Spent for Care: 30 minutes  More than 50% of total time spent on counseling and coordination of care as described above  Current Length of Stay: 1 day(s)    Current Patient Status: Inpatient   Certification Statement: The patient will continue to require additional inpatient hospital stay due to acute renal failure, safe discharge planning     Discharge Plan / Estimated Discharge Date: not medically stable, suspect pt will need some assistance at home with VNA    Code Status: Level 1 - Full Code    Subjective:   Pt seen and examined at bedside  No pain  Denies dysuria or frequency  No cp or sob  States she was putting desitin on her intertrigo  Objective:     Vitals:   Temp (24hrs), Av 2 °F (36 8 °C), Min:97 7 °F (36 5 °C), Max:98 6 °F (37 °C)    HR:  [70-80] 70  Resp:  [16-18] 18  BP: ()/(51-78) 111/56  SpO2:  [97 %-99 %] 99 %  Body mass index is 38 67 kg/m²  Input and Output Summary (last 24 hours):     No intake or output data in the 24 hours ending 18 1148    Physical Exam:     Physical Exam   Constitutional: She is oriented to person, place, and time  She appears well-developed and well-nourished  Disheveled appearing    HENT:   Head: Normocephalic and atraumatic  Mouth/Throat: Oropharynx is clear and moist    Eyes: EOM are normal  No scleral icterus  Neck: Normal range of motion  Neck supple  Cardiovascular: Normal rate, regular rhythm and normal heart sounds  No murmur heard  Pulmonary/Chest: Effort normal and breath sounds normal  No respiratory distress  She has no wheezes  She has no rales  Abdominal: Soft  Bowel sounds are normal  She exhibits no distension     Obese    Musculoskeletal: Normal range of motion  Edema: +2-3 b/l LE edema  Neurological: She is alert and oriented to person, place, and time  Skin: There is erythema  Severe intertrigo under b/l breasts and groin skin folds  Sacral decub with serosanguinous drainage noted   Psychiatric: She has a normal mood and affect  Additional Data:     Labs:      Results from last 7 days  Lab Units 07/21/18  0447 07/20/18  1623   WBC Thousand/uL 8 43 10 62*   HEMOGLOBIN g/dL 8 0* 10 6*   HEMATOCRIT % 25 7* 32 1*   PLATELETS Thousands/uL 327 404*   NEUTROS PCT %  --  78*   LYMPHS PCT %  --  10*   MONOS PCT %  --  9   EOS PCT %  --  3       Results from last 7 days  Lab Units 07/21/18  0447 07/20/18  1623   SODIUM mmol/L 138 131*   POTASSIUM mmol/L 3 5 4 6   CHLORIDE mmol/L 103 94*   CO2 mmol/L 26 30   BUN mg/dL 66* 77*   CREATININE mg/dL 2 79* 3 56*   CALCIUM mg/dL 8 1* 9 7   TOTAL PROTEIN g/dL  --  7 4   BILIRUBIN TOTAL mg/dL  --  0 40   ALK PHOS U/L  --  87   ALT U/L  --  15   AST U/L  --  13   GLUCOSE RANDOM mg/dL 117 287*           * I Have Reviewed All Lab Data Listed Above  * Additional Pertinent Lab Tests Reviewed:  Dominique 66 Admission Reviewed    Imaging:    Imaging Reports Reviewed Today Include: all  Imaging Personally Reviewed by Myself Includes:  none    Recent Cultures (last 7 days):           Last 24 Hours Medication List:     Current Facility-Administered Medications:  acetaminophen 650 mg Oral Q6H PRN Cydney Duron MD   aspirin 81 mg Oral Daily Yvonne Ferrer MD   bimatoprost 1 drop Both Eyes HS Cydney Duron MD   cholecalciferol 1,000 Units Oral Daily Yvonne Ferrer MD   docusate sodium 100 mg Oral BID PRN Cydney Duron MD   heparin (porcine) 5,000 Units Subcutaneous Q8H [facility] Cydney Duron MD   HYDROcodone-acetaminophen 1 tablet Oral BID PRN Yvonne Ferrer MD   insulin lispro 1-5 Units Subcutaneous TID Tala Ferrer MD   insulin lispro 1-5 Units Subcutaneous  Cydney Duron MD   levothyroxine 75 mcg Oral Early Morning Charlesetta Kehr, MD   metoprolol tartrate 25 mg Oral Q12H SABRINA Charlesetta Kehr, MD   nystatin  Topical BID Holden Ferrer MD   ondansetron 4 mg Intravenous Q6H PRN Charlesetta Kehr, MD   pravastatin 40 mg Oral Daily With Carmelo Duane, MD   prednisoLONE acetate 1 drop Both Eyes BID Charlesetta Kehr, MD   sodium chloride 75 mL/hr Intravenous Once Alecia Oneill PA-C   timolol 1 drop Right Eye Daily Charlesetta Kehr, MD        Today, Patient Was Seen By: Alecia Oneill PA-C    ** Please Note: This note has been constructed using a voice recognition system   **

## 2018-07-22 PROBLEM — R78.81 POSITIVE BLOOD CULTURE: Status: ACTIVE | Noted: 2018-07-22

## 2018-07-22 LAB
ANION GAP SERPL CALCULATED.3IONS-SCNC: 5 MMOL/L (ref 4–13)
BUN SERPL-MCNC: 49 MG/DL (ref 5–25)
CALCIUM SERPL-MCNC: 8.5 MG/DL (ref 8.3–10.1)
CHLORIDE SERPL-SCNC: 105 MMOL/L (ref 100–108)
CO2 SERPL-SCNC: 27 MMOL/L (ref 21–32)
CREAT SERPL-MCNC: 2.12 MG/DL (ref 0.6–1.3)
ERYTHROCYTE [DISTWIDTH] IN BLOOD BY AUTOMATED COUNT: 13.2 % (ref 11.6–15.1)
GFR SERPL CREATININE-BSD FRML MDRD: 21 ML/MIN/1.73SQ M
GLUCOSE SERPL-MCNC: 138 MG/DL (ref 65–140)
GLUCOSE SERPL-MCNC: 161 MG/DL (ref 65–140)
GLUCOSE SERPL-MCNC: 161 MG/DL (ref 65–140)
GLUCOSE SERPL-MCNC: 171 MG/DL (ref 65–140)
GLUCOSE SERPL-MCNC: 227 MG/DL (ref 65–140)
HCT VFR BLD AUTO: 27.3 % (ref 34.8–46.1)
HGB BLD-MCNC: 8.6 G/DL (ref 11.5–15.4)
MCH RBC QN AUTO: 29.5 PG (ref 26.8–34.3)
MCHC RBC AUTO-ENTMCNC: 31.5 G/DL (ref 31.4–37.4)
MCV RBC AUTO: 94 FL (ref 82–98)
PLATELET # BLD AUTO: 386 THOUSANDS/UL (ref 149–390)
PMV BLD AUTO: 9.7 FL (ref 8.9–12.7)
POTASSIUM SERPL-SCNC: 4 MMOL/L (ref 3.5–5.3)
RBC # BLD AUTO: 2.92 MILLION/UL (ref 3.81–5.12)
SODIUM SERPL-SCNC: 137 MMOL/L (ref 136–145)
WBC # BLD AUTO: 8.26 THOUSAND/UL (ref 4.31–10.16)

## 2018-07-22 PROCEDURE — 82948 REAGENT STRIP/BLOOD GLUCOSE: CPT

## 2018-07-22 PROCEDURE — 99232 SBSQ HOSP IP/OBS MODERATE 35: CPT | Performed by: PHYSICIAN ASSISTANT

## 2018-07-22 PROCEDURE — 93970 EXTREMITY STUDY: CPT | Performed by: SURGERY

## 2018-07-22 PROCEDURE — 85027 COMPLETE CBC AUTOMATED: CPT | Performed by: PHYSICIAN ASSISTANT

## 2018-07-22 PROCEDURE — 80048 BASIC METABOLIC PNL TOTAL CA: CPT | Performed by: PHYSICIAN ASSISTANT

## 2018-07-22 RX ADMIN — LEVOTHYROXINE SODIUM 75 MCG: 75 TABLET ORAL at 05:05

## 2018-07-22 RX ADMIN — NYSTATIN: 100000 POWDER TOPICAL at 10:04

## 2018-07-22 RX ADMIN — ASPIRIN 81 MG: 81 TABLET, COATED ORAL at 10:02

## 2018-07-22 RX ADMIN — NYSTATIN 1 APPLICATION: 100000 POWDER TOPICAL at 18:01

## 2018-07-22 RX ADMIN — METOPROLOL TARTRATE 25 MG: 25 TABLET ORAL at 10:01

## 2018-07-22 RX ADMIN — VITAMIN D, TAB 1000IU (100/BT) 1000 UNITS: 25 TAB at 10:01

## 2018-07-22 RX ADMIN — BIMATOPROST 1 DROP: 0.1 SOLUTION/ DROPS OPHTHALMIC at 21:19

## 2018-07-22 RX ADMIN — PREDNISOLONE ACETATE 1 DROP: 10 SUSPENSION/ DROPS OPHTHALMIC at 10:03

## 2018-07-22 RX ADMIN — INSULIN LISPRO 2 UNITS: 100 INJECTION, SOLUTION INTRAVENOUS; SUBCUTANEOUS at 12:46

## 2018-07-22 RX ADMIN — PRAVASTATIN SODIUM 40 MG: 40 TABLET ORAL at 16:27

## 2018-07-22 RX ADMIN — HEPARIN SODIUM 5000 UNITS: 5000 INJECTION, SOLUTION INTRAVENOUS; SUBCUTANEOUS at 05:06

## 2018-07-22 RX ADMIN — HEPARIN SODIUM 5000 UNITS: 5000 INJECTION, SOLUTION INTRAVENOUS; SUBCUTANEOUS at 21:19

## 2018-07-22 RX ADMIN — INSULIN LISPRO 1 UNITS: 100 INJECTION, SOLUTION INTRAVENOUS; SUBCUTANEOUS at 10:03

## 2018-07-22 RX ADMIN — HEPARIN SODIUM 5000 UNITS: 5000 INJECTION, SOLUTION INTRAVENOUS; SUBCUTANEOUS at 15:14

## 2018-07-22 RX ADMIN — PREDNISOLONE ACETATE 1 DROP: 10 SUSPENSION/ DROPS OPHTHALMIC at 18:01

## 2018-07-22 RX ADMIN — METOPROLOL TARTRATE 25 MG: 25 TABLET ORAL at 21:19

## 2018-07-22 RX ADMIN — INSULIN LISPRO 1 UNITS: 100 INJECTION, SOLUTION INTRAVENOUS; SUBCUTANEOUS at 16:28

## 2018-07-22 RX ADMIN — TIMOLOL MALEATE 1 DROP: 5 SOLUTION/ DROPS OPHTHALMIC at 10:03

## 2018-07-22 NOTE — ASSESSMENT & PLAN NOTE
· PT, OT consulted suspect patient may need STR or at least VNA  · States she ambulates with walker but suspect patient not as ambulatory as she states since she presented with stage 2

## 2018-07-22 NOTE — ASSESSMENT & PLAN NOTE
· POA, presented from home with stage 2  · Appreciate surgery input  · Duoderm q72 hrs, frequent repositioning, pillows for offloading   · No indication for systemic antibiotics at this time  · Serial exams

## 2018-07-22 NOTE — ASSESSMENT & PLAN NOTE
No results found for: HGBA1C    Recent Labs      07/21/18   1124  07/21/18   1605  07/21/18   2124  07/22/18   0733   POCGLU  207*  216*  257*  161*     Blood Sugar Average: Last 72 hrs:  · Hemoglobin A1c 6 9 in July this year  · Hold Tradjenta  · [facility], accuchedith   (P) 559 [phone]

## 2018-07-22 NOTE — ASSESSMENT & PLAN NOTE
· Baseline hgb 11-12, stable around 8  · Suspect dilutional and 2/2 bleeding decub ulcer   · Monitor H&H

## 2018-07-22 NOTE — ASSESSMENT & PLAN NOTE
· Reviewed [facility] records, 1 4-1 6  Suspect stage 3 CKD  Creatinine POA 3 5  Now 2 12  · Hold nephrotoxin medications including Lasix, Ace inhibitor, hydrochlorothiazide  · Per PCP note, Lasix 40mg/d was for chronic venous insufficiency, no mention of heart failure  LE edema improved today  Venous duplex studies pending  · Renal ultrasound with medical renal disease   · UA no proteinuria   · D/c IVF   · Repeat labs in a m

## 2018-07-22 NOTE — PROGRESS NOTES
Progress Note - Fara Devineing 1935, 81 y o  female MRN: [id number]  Unit/Bed#: -Miky Encounter: [id number] DOS: 7/22/18   Primary Care Provider: Josefina Toure MD   Date and time admitted to hospital: 7/20/2018  2:39 PM    * Acute kidney injury [facility])   Assessment & Plan    · Reviewed [facility] records, 1 4-1 6  Suspect stage 3 CKD  Creatinine POA 3 5  Now 2 12  · Hold nephrotoxin medications including Lasix, Ace inhibitor, hydrochlorothiazide  · Per PCP note, Lasix 40mg/d was for chronic venous insufficiency, no mention of heart failure  LE edema improved today  Venous duplex studies pending  · Renal ultrasound with medical renal disease   · UA no proteinuria   · D/c IVF   · Repeat labs in a m  Positive blood culture   Assessment & Plan    · 1/2 blood culture from admission positive for GPC  · Follow up final results   · Afebrile without leukocytosis   Possibly skin contaminant vs cutaneous etiology from wound        Anemia   Assessment & Plan    · Baseline hgb 11-12, stable around 8  · Suspect dilutional and 2/2 bleeding decub ulcer   · Monitor H&H        Ambulatory dysfunction   Assessment & Plan    · PT, OT consulted suspect patient may need STR or at least VNA  · States she ambulates with walker but suspect patient not as ambulatory as she states since she presented with stage 2         Candidal intertrigo   Assessment & Plan    · Nystatin powder  · Wound care c/s         Morbid obesity ([address] )   Assessment & Plan    · Recommend diet and lifestyle modifications         Essential hypertension   Assessment & Plan    · Continue beta-blocker  · Hold Ace/HCTZ        Decubitus ulcer of buttock, stage 2   Assessment & Plan    · POA, presented from home with stage 2  · Appreciate surgery input  · Duoderm q72 hrs, frequent repositioning, pillows for offloading   · No indication for systemic antibiotics at this time  · Serial exams         Type 2 diabetes mellitus with diabetic polyneuropathy, without long-term current use of insulin [facility])   Assessment & Plan    No results found for: HGBA1C    Recent Labs      18   1124  18   1605  18   2124  18   0733   POCGLU  207*  216*  257*  161*     Blood Sugar Average: Last 72 hrs:  · Hemoglobin A1c 6 9 in July this year  · Hold Tradjenta  · SSI, accucheks   (P) [phone]          VTE Pharmacologic Prophylaxis:   Pharmacologic: Heparin  Mechanical VTE Prophylaxis in Place: Yes    Patient Centered Rounds: I have performed bedside rounds with nursing staff today  Discussions with Specialists or Other Care Team Provider: nursing, cm     Education and Discussions with Family / Patient: patient, spoke with son and dtr in law     Time Spent for Care: 30 minutes  More than 50% of total time spent on counseling and coordination of care as described above  Current Length of Stay: 2 day(s)    Current Patient Status: Inpatient   Certification Statement: The patient will continue to require additional inpatient hospital stay due to BERTHA, PT eval, safe discharge planning, wound care eval    Discharge Plan / Estimated Discharge Date: pending, possible d/c in next 24 hours? Family concerned about her coming home too soon and about her ambulation  Code Status: Level 1 - Full Code    Subjective:   No new complaints  Normal urination no dysuria  Good appetite  Objective:     Vitals:   Temp (24hrs), Av °F (36 7 °C), Min:97 5 °F (36 4 °C), Max:98 4 °F (36 9 °C)    HR:  [71-95] 77  Resp:  [16-18] 18  BP: (121-133)/(58-59) 121/59  SpO2:  [95 %-98 %] 98 %  Body mass index is 38 67 kg/m²  Input and Output Summary (last 24 hours): Intake/Output Summary (Last 24 hours) at 18 1158  Last data filed at 18 1900   Gross per 24 hour   Intake              360 ml   Output                0 ml   Net              360 ml     Physical Exam:     Physical Exam   Constitutional: She is oriented to person, place, and time   She appears well-developed and well-nourished  Disheveled appearing    HENT:   Head: Normocephalic and atraumatic  Mouth/Throat: Oropharynx is clear and moist    Eyes: EOM are normal  No scleral icterus  Neck: Normal range of motion  Neck supple  Cardiovascular: Normal rate, regular rhythm and normal heart sounds  No murmur heard  Pulmonary/Chest: Effort normal and breath sounds normal    Abdominal: Soft  Bowel sounds are normal    Obese    Musculoskeletal: Normal range of motion  She exhibits edema  Neurological: She is alert and oriented to person, place, and time  Skin: Skin is warm and dry  Intertrigo in groin and breast folds b/l   Psychiatric: She has a normal mood and affect  Nursing note and vitals reviewed  Additional Data:     Labs:      Results from last 7 days  Lab Units 07/22/18  0526  07/20/18  1623   WBC Thousand/uL 8 26  < > 10 62*   HEMOGLOBIN g/dL 8 6*  < > 10 6*   HEMATOCRIT % 27 3*  < > 32 1*   PLATELETS Thousands/uL 386  < > 404*   NEUTROS PCT %  --   --  78*   LYMPHS PCT %  --   --  10*   MONOS PCT %  --   --  9   EOS PCT %  --   --  3   < > = values in this interval not displayed  Results from last 7 days  Lab Units 07/22/18  0526  07/20/18  1623   SODIUM mmol/L 137  < > 131*   POTASSIUM mmol/L 4 0  < > 4 6   CHLORIDE mmol/L 105  < > 94*   CO2 mmol/L 27  < > 30   BUN mg/dL 49*  < > 77*   CREATININE mg/dL 2 12*  < > 3 56*   CALCIUM mg/dL 8 5  < > 9 7   TOTAL PROTEIN g/dL  --   --  7 4   BILIRUBIN TOTAL mg/dL  --   --  0 40   ALK PHOS U/L  --   --  87   ALT U/L  --   --  15   AST U/L  --   --  13   GLUCOSE RANDOM mg/dL 171*  < > 287*   < > = values in this interval not displayed  * I Have Reviewed All Lab Data Listed Above  * Additional Pertinent Lab Tests Reviewed:  Dominique 66 Admission Reviewed    Imaging:    Imaging Reports Reviewed Today Include: all  Imaging Personally Reviewed by Myself Includes:  none    Recent Cultures (last 7 days):       Results from last 7 days  Lab Units 07/20/18  1751 07/20/18  1626 07/20/18  1623   BLOOD CULTURE   --  No Growth at 24 hrs   --    GRAM STAIN RESULT   --   --  Gram positive cocci in clusters   URINE CULTURE  >100,000 cfu/ml   --   --        Last 24 Hours Medication List:     Current Facility-Administered Medications:  acetaminophen 650 mg Oral Q6H PRN Sylvester Chau MD   aspirin 81 mg Oral Daily Jamarcus Ferrer MD   bimatoprost 1 drop Both Eyes HS Sylvester Chau MD   cholecalciferol 1,000 Units Oral Daily Abbie Ferrer MD   docusate sodium 100 mg Oral BID PRN Sylvester Chau MD   heparin (porcine) 5,000 Units Subcutaneous Q8H Marcos Ferrer MD   HYDROcodone-acetaminophen 1 tablet Oral BID PRN Abbie Ferrer MD   insulin lispro 1-5 Units Subcutaneous TID Autumn Alcala MD   insulin lispro 1-5 Units Subcutaneous HS Sylvester Chau MD   levothyroxine 75 mcg Oral Early Morning Sylvester Chau MD   metoprolol tartrate 25 mg Oral Q12H [facility] Sylvester Chau MD   nystatin  Topical BID Abbie Ferrer MD   ondansetron 4 mg Intravenous Q6H PRN Sylvester Chau MD   pravastatin 40 mg Oral Daily With Jim Roberts MD   prednisoLONE acetate 1 drop Both Eyes BID Sylvester Chau MD   timolol 1 drop Right Eye Daily Sylvester Chau MD        Today, Patient Was Seen By: Rodrick Selby PA-C    ** Please Note: This note has been constructed using a voice recognition system   **

## 2018-07-22 NOTE — PLAN OF CARE
METABOLIC, FLUID AND ELECTROLYTES - ADULT     Fluid balance maintained Progressing     Glucose maintained within target range Progressing        PAIN - ADULT     Verbalizes/displays adequate comfort level or baseline comfort level Progressing        Potential for Falls     Patient will remain free of falls Progressing        Prexisting or High Potential for Compromised Skin Integrity     Skin integrity is maintained or improved Progressing        SKIN/TISSUE INTEGRITY - ADULT     Skin integrity remains intact Progressing

## 2018-07-22 NOTE — ASSESSMENT & PLAN NOTE
· 1/2 blood culture from admission positive for GPC  · Follow up final results   · Afebrile without leukocytosis   Possibly skin contaminant vs cutaneous etiology from wound

## 2018-07-23 LAB
ANION GAP SERPL CALCULATED.3IONS-SCNC: 5 MMOL/L (ref 4–13)
BUN SERPL-MCNC: 34 MG/DL (ref 5–25)
CALCIUM SERPL-MCNC: 9 MG/DL (ref 8.3–10.1)
CHLORIDE SERPL-SCNC: 105 MMOL/L (ref 100–108)
CO2 SERPL-SCNC: 27 MMOL/L (ref 21–32)
CREAT SERPL-MCNC: 1.77 MG/DL (ref 0.6–1.3)
GFR SERPL CREATININE-BSD FRML MDRD: 26 ML/MIN/1.73SQ M
GLUCOSE SERPL-MCNC: 112 MG/DL (ref 65–140)
GLUCOSE SERPL-MCNC: 152 MG/DL (ref 65–140)
GLUCOSE SERPL-MCNC: 224 MG/DL (ref 65–140)
GLUCOSE SERPL-MCNC: 284 MG/DL (ref 65–140)
GLUCOSE SERPL-MCNC: 99 MG/DL (ref 65–140)
POTASSIUM SERPL-SCNC: 4 MMOL/L (ref 3.5–5.3)
SODIUM SERPL-SCNC: 137 MMOL/L (ref 136–145)

## 2018-07-23 PROCEDURE — 82948 REAGENT STRIP/BLOOD GLUCOSE: CPT

## 2018-07-23 PROCEDURE — G8979 MOBILITY GOAL STATUS: HCPCS

## 2018-07-23 PROCEDURE — 97166 OT EVAL MOD COMPLEX 45 MIN: CPT

## 2018-07-23 PROCEDURE — 99232 SBSQ HOSP IP/OBS MODERATE 35: CPT | Performed by: NURSE PRACTITIONER

## 2018-07-23 PROCEDURE — G8988 SELF CARE GOAL STATUS: HCPCS

## 2018-07-23 PROCEDURE — 97535 SELF CARE MNGMENT TRAINING: CPT

## 2018-07-23 PROCEDURE — G8987 SELF CARE CURRENT STATUS: HCPCS

## 2018-07-23 PROCEDURE — 80048 BASIC METABOLIC PNL TOTAL CA: CPT | Performed by: PHYSICIAN ASSISTANT

## 2018-07-23 PROCEDURE — G8978 MOBILITY CURRENT STATUS: HCPCS

## 2018-07-23 PROCEDURE — 97163 PT EVAL HIGH COMPLEX 45 MIN: CPT

## 2018-07-23 RX ADMIN — TIMOLOL MALEATE 1 DROP: 5 SOLUTION/ DROPS OPHTHALMIC at 09:03

## 2018-07-23 RX ADMIN — VITAMIN D, TAB 1000IU (100/BT) 1000 UNITS: 25 TAB at 09:02

## 2018-07-23 RX ADMIN — PREDNISOLONE ACETATE 1 DROP: 10 SUSPENSION/ DROPS OPHTHALMIC at 08:59

## 2018-07-23 RX ADMIN — PRAVASTATIN SODIUM 40 MG: 40 TABLET ORAL at 17:32

## 2018-07-23 RX ADMIN — BIMATOPROST 1 DROP: 0.1 SOLUTION/ DROPS OPHTHALMIC at 21:24

## 2018-07-23 RX ADMIN — METOPROLOL TARTRATE 25 MG: 25 TABLET ORAL at 09:02

## 2018-07-23 RX ADMIN — NYSTATIN: 100000 POWDER TOPICAL at 17:32

## 2018-07-23 RX ADMIN — METOPROLOL TARTRATE 25 MG: 25 TABLET ORAL at 21:24

## 2018-07-23 RX ADMIN — INSULIN LISPRO 2 UNITS: 100 INJECTION, SOLUTION INTRAVENOUS; SUBCUTANEOUS at 17:31

## 2018-07-23 RX ADMIN — HEPARIN SODIUM 5000 UNITS: 5000 INJECTION, SOLUTION INTRAVENOUS; SUBCUTANEOUS at 04:56

## 2018-07-23 RX ADMIN — INSULIN LISPRO 1 UNITS: 100 INJECTION, SOLUTION INTRAVENOUS; SUBCUTANEOUS at 21:24

## 2018-07-23 RX ADMIN — NYSTATIN: 100000 POWDER TOPICAL at 08:59

## 2018-07-23 RX ADMIN — LEVOTHYROXINE SODIUM 75 MCG: 75 TABLET ORAL at 04:55

## 2018-07-23 RX ADMIN — ASPIRIN 81 MG: 81 TABLET, COATED ORAL at 09:02

## 2018-07-23 NOTE — PROGRESS NOTES
Progress Note - Xiomara Sink 1935, 81 y o  female MRN: [id number]    Unit/Bed#: -01 Encounter: [id number]    Primary Care Provider: Naty Hull MD   Date and time admitted to hospital: 7/20/2018  2:39 PM              Decubitus ulcer of buttock, stage 2   Assessment & Plan    · POA, presented from home with stage 2  · Appreciate surgery input  · Duoderm q72 hrs, frequent repositioning, pillows for offloading   · No indication for systemic antibiotics at this time  · Serial exams         * Acute kidney injury [facility])   Assessment & Plan    · Reviewed [facility] records, 1 4-1 6  Suspect stage 3 CKD  Creatinine POA 3 5  Now 1 77  · Hold nephrotoxin medications including Lasix, Ace inhibitor, hydrochlorothiazide  · Per PCP note, Lasix 40mg/d was for chronic venous insufficiency, no mention of heart failure  · Venous duplex studies (-) for DVT   · Renal ultrasound with medical renal disease   · UA no proteinuria   · Repeat am bmp         Positive blood culture   Assessment & Plan    · 1/2 blood culture from admission positive for staph aureus   · Afebrile without leukocytosis   Possibly skin contaminant vs cutaneous etiology from wound        Anemia   Assessment & Plan    · Baseline hgb 11-12, stable around 8  · Suspect dilutional and 2/2 bleeding decub ulcer   · Check am cbc, hemetest stool and check iron panel         Ambulatory dysfunction   Assessment & Plan    · PT, OT consults pending        Candidal intertrigo   Assessment & Plan    · Nystatin powder  · Wound care c/s         Morbid obesity ([address] )   Assessment & Plan    · Recommend diet and lifestyle modifications   · BMI noted at 45        Essential hypertension   Assessment & Plan    · Continue beta-blocker  · Hold Ace/HCTZ        Type 2 diabetes mellitus with diabetic polyneuropathy, without long-term current use of insulin [facility])   Assessment & Plan    No results found for: HGBA1C    Recent Labs      07/22/18   1612  07/22/18   2126  07/23/18 0724  18   1102   POCGLU  161*  138  99  152*     Blood Sugar Average: Last 72 hrs:  · Hemoglobin A1c 6 9 in July this year  · Hold Tradjenta  · SSI, jorge   (P) 269 [phone]            VTE Pharmacologic Prophylaxis:   Pharmacologic: Heparin  Mechanical VTE Prophylaxis in Place: Yes    Patient Centered Rounds: I have performed bedside rounds with nursing staff today  Discussions with Specialists or Other Care Team Provider: d/w RN     Education and Discussions with Family / Patient: d/w patient  D/w son over the phone  Time Spent for Care: 30 minutes  More than 50% of total time spent on counseling and coordination of care as described above  Current Length of Stay: 3 day(s)    Current Patient Status: Inpatient   Certification Statement: The patient will continue to require additional inpatient hospital stay due to BERTHA, pending PT/OT    Discharge Plan: pending PT/OT  Check am labs  Likely d/c in the next 24 hours     Code Status: Level 1 - Full Code      Subjective:   Pt denies any complaints  Per RN still some oozing from sacral ulcer  Objective:     Vitals:   Temp (24hrs), Av 4 °F (36 9 °C), Min:98 2 °F (36 8 °C), Max:98 6 °F (37 °C)    HR:  [71-81] 81  Resp:  [18] 18  BP: (136-157)/(61-73) 136/61  SpO2:  [95 %-96 %] 95 %  Body mass index is 38 67 kg/m²  Input and Output Summary (last 24 hours): Intake/Output Summary (Last 24 hours) at 18 1123  Last data filed at 18 0135   Gross per 24 hour   Intake                0 ml   Output              900 ml   Net             -900 ml       Physical Exam:     Physical Exam   Constitutional: She is oriented to person, place, and time  No distress  Cardiovascular: Normal rate, regular rhythm, normal heart sounds and intact distal pulses  No murmur heard  Pulmonary/Chest: Effort normal and breath sounds normal  No respiratory distress  She has no wheezes  She has no rales  Abdominal: Soft   Bowel sounds are normal  She exhibits no distension  There is no tenderness  There is no rebound  Musculoskeletal: She exhibits edema (mild b/l lower extremity edema)  She exhibits no tenderness  Neurological: She is alert and oriented to person, place, and time  Skin: She is not diaphoretic  Psychiatric: She has a normal mood and affect  Additional Data:     Labs:      Results from last 7 days  Lab Units 07/22/18  0526  07/20/18  1623   WBC Thousand/uL 8 26  < > 10 62*   HEMOGLOBIN g/dL 8 6*  < > 10 6*   HEMATOCRIT % 27 3*  < > 32 1*   PLATELETS Thousands/uL 386  < > 404*   NEUTROS PCT %  --   --  78*   LYMPHS PCT %  --   --  10*   MONOS PCT %  --   --  9   EOS PCT %  --   --  3   < > = values in this interval not displayed  Results from last 7 days  Lab Units 07/23/18  0442  07/20/18  1623   SODIUM mmol/L 137  < > 131*   POTASSIUM mmol/L 4 0  < > 4 6   CHLORIDE mmol/L 105  < > 94*   CO2 mmol/L 27  < > 30   BUN mg/dL 34*  < > 77*   CREATININE mg/dL 1 77*  < > 3 56*   CALCIUM mg/dL 9 0  < > 9 7   TOTAL PROTEIN g/dL  --   --  7 4   BILIRUBIN TOTAL mg/dL  --   --  0 40   ALK PHOS U/L  --   --  87   ALT U/L  --   --  15   AST U/L  --   --  13   GLUCOSE RANDOM mg/dL 112  < > 287*   < > = values in this interval not displayed  Results from last 7 days  Lab Units 07/23/18  1102 07/23/18  0724 07/22/18  2126 07/22/18  1612 07/22/18  1107 07/22/18  0733 07/21/18  2124 07/21/18  1605 07/21/18  1124 07/21/18  0725 07/20/18  2121   POC GLUCOSE mg/dl 152* 99 138 161* 227* 161* 257* 216* 207* 127 215*             * I Have Reviewed All Lab Data Listed Above  * Additional Pertinent Lab Tests Reviewed: All Labs Within Last 24 Hours Reviewed    Imaging:    Imaging Reports Reviewed Today Include: venous duplex, renal US     Recent Cultures (last 7 days):       Results from last 7 days  Lab Units 07/20/18  1751 07/20/18  1626 07/20/18  1623   BLOOD CULTURE   --  No Growth at 48 hrs   Staphylococcus aureus*   GRAM STAIN RESULT   --   --  Gram positive cocci in clusters   URINE CULTURE  >100,000 cfu/ml   --   --        Last 24 Hours Medication List:     Current Facility-Administered Medications:  acetaminophen 650 mg Oral Q6H PRN Ewa Alexander MD   aspirin 81 mg Oral Daily Cammie Ferrer MD   bimatoprost 1 drop Both Eyes HS Ewa Alexander MD   cholecalciferol 1,000 Units Oral Daily Ewa Alexander MD   docusate sodium 100 mg Oral BID PRN Ewa Alexander MD   heparin (porcine) 5,000 Units Subcutaneous Q8H [address] [place] Elder Brcarol Ferrer MD   HYDROcodone-acetaminophen 1 tablet Oral BID PRN Cammie Ferrer MD   insulin lispro 1-5 Units Subcutaneous TID Tomer Ferrer MD   insulin lispro 1-5 Units Subcutaneous HS Ewa Alexander MD   levothyroxine 75 mcg Oral Early Morning Ewa Alexander MD   metoprolol tartrate 25 mg Oral Q12H [facility] Ewa Alexander MD   nystatin  Topical BID Cammie Ferrer MD   ondansetron 4 mg Intravenous Q6H PRN Ewa Alexander MD   pravastatin 40 mg Oral Daily With Adelaide Lowe MD   prednisoLONE acetate 1 drop Both Eyes BID Ewa Alexander MD   timolol 1 drop Right Eye Daily Ewa Alexander MD        Today, Patient Was Seen By: BRENT Oconnor    ** Please Note: Dictation voice to text software may have been used in the creation of this document   **

## 2018-07-23 NOTE — ASSESSMENT & PLAN NOTE
· Reviewed [facility] records, 1 4-1 6  Suspect stage 3 CKD  Creatinine POA 3 5  Now 1 77  · Hold nephrotoxin medications including Lasix, Ace inhibitor, hydrochlorothiazide  · Per PCP note, Lasix 40mg/d was for chronic venous insufficiency, no mention of heart failure     · Venous duplex studies (-) for DVT   · Renal ultrasound with medical renal disease   · UA no proteinuria   · Repeat am bmp

## 2018-07-23 NOTE — CONSULTS
Patient is being followed by acute care surgery for buttock pressure injury  Wound care orders noted for duoderm (hydrocolloid), per nursing flow sheets patient is continent and this is an appropriate treatment  No other wounds documented  Wound care will sign off  Please re-consult if needed

## 2018-07-23 NOTE — PLAN OF CARE
Problem: Potential for Falls  Goal: Patient will remain free of falls  INTERVENTIONS:  - Assess patient frequently for physical needs  -  Identify cognitive and physical deficits and behaviors that affect risk of falls    -  [place] fall precautions as indicated by assessment   - Educate patient/family on patient safety including physical limitations  - Instruct patient to call for assistance with activity based on assessment  - Modify environment to reduce risk of injury  - Consider OT/PT consult to assist with strengthening/mobility   Outcome: Progressing      Problem: PAIN - ADULT  Goal: Verbalizes/displays adequate comfort level or baseline comfort level  Interventions:  - Encourage patient to monitor pain and request assistance  - Assess pain using appropriate pain scale  - Administer analgesics based on type and severity of pain and evaluate response  - Implement non-pharmacological measures as appropriate and evaluate response  - Consider cultural and social influences on pain and pain management  - Notify physician/advanced practitioner if interventions unsuccessful or patient reports new pain   Outcome: Progressing      Problem: METABOLIC, FLUID AND ELECTROLYTES - ADULT  Goal: Fluid balance maintained  INTERVENTIONS:  - Monitor labs and assess for signs and symptoms of volume excess or deficit  - Monitor I/O and WT  - Instruct patient on fluid and nutrition as appropriate   Outcome: Progressing    Goal: Glucose maintained within target range  INTERVENTIONS:  - Monitor Blood Glucose as ordered  - Assess for signs and symptoms of hyperglycemia and hypoglycemia  - Administer ordered medications to maintain glucose within target range  - Assess nutritional intake and initiate nutrition service referral as needed   Outcome: Progressing      Problem: SKIN/TISSUE INTEGRITY - ADULT  Goal: Skin integrity remains intact  INTERVENTIONS  - Identify patients at risk for skin breakdown  - Assess and monitor skin integrity  - Assess and monitor nutrition and hydration status  - Monitor labs (i e  albumin)  - Assess for incontinence   - Turn and reposition patient  - Assist with mobility/ambulation  - Relieve pressure over bony prominences  - Avoid friction and shearing  - Provide appropriate hygiene as needed including keeping skin clean and dry  - Evaluate need for skin moisturizer/barrier cream  - Collaborate with interdisciplinary team (i e  Nutrition, Rehabilitation, etc )   - Patient/family teaching   Outcome: Progressing      Problem: Prexisting or High Potential for Compromised Skin Integrity  Goal: Skin integrity is maintained or improved  INTERVENTIONS:  - Identify patients at risk for skin breakdown  - Assess and monitor skin integrity  - Assess and monitor nutrition and hydration status  - Monitor labs (i e  albumin)  - Assess for incontinence   - Turn and reposition patient  - Assist with mobility/ambulation  - Relieve pressure over bony prominences  - Avoid friction and shearing  - Provide appropriate hygiene as needed including keeping skin clean and dry  - Evaluate need for skin moisturizer/barrier cream  - Collaborate with interdisciplinary team (i e  Nutrition, Rehabilitation, etc )   - Patient/family teaching   Outcome: Progressing

## 2018-07-23 NOTE — PHYSICAL THERAPY NOTE
PHYSICAL THERAPY EVALUATION  NAME:  Chucho Hernandez  DATE: 07/23/18    AGE:   81 y o  Mrn:   [id number]  ADMIT DX:  Intertrigo [L30 4]  Weakness [R53 1]  Acute renal failure (ARF) (HCC) [N17 9]  Pressure sore [L89 90]  Decubitus ulcer of left buttock, stage 2 [L89 322]  Ambulatory dysfunction [R26 2]    Past Medical History:   Diagnosis Date    Diabetes mellitus ([facility] [address] )     Hyperlipidemia     Hypertension      Length Of Stay: 3  Performed at least 2 patient identifiers during session: Name and Birthday  PHYSICAL THERAPY EVALUATION :    07/23/18 6589   Note Type   Note type Eval only   Pain Assessment   Pain Assessment 0-10   Pain Score 5   Pain Type Acute pain;Chronic pain   Pain Location Knee   Pain Orientation Left   Effect of Pain on Daily Activities limits knee flexion, WB activities, speed and indep of mobiltiy   Patient's Stated Pain Goal No pain   Hospital Pain Intervention(s) Repositioned; Ambulation/increased activity; Emotional support   Home Living   Type of Home Apartment  (next to son and daughter in [place] [address])   Home Layout One level; Other (Comment); Performs ADLs on one level   Home Equipment Walker;Grab bars; Other (Comment)   Prior Function   Level of [place] Needs assistance with IADLs   Lives With Alone   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; Other (Comment)  (cleaning service)   ADL Assistance Independent   IADLs Needs assistance   Falls in the last 6 months 0   Vocational Retired   Comments Pt reports mostly sedentary and gets up from [employer] to use bathroom or open door for son's dog  Pt reports using RW for functional mobility and sleeps in LIFT recliner chair  Pt added has been sleeping there for over year   Restrictions/Precautions   Other Precautions Bed Alarm; Chair Alarm; Fall Risk;Pain   General   Family/Caregiver Present No   Cognition   Overall Cognitive Status Impaired   Arousal/Participation Cooperative   Following Commands Follows one step commands inconsistently  (with MMR)   RUE Strength   RUE Overall Strength Deficits   LUE Strength   LUE Overall Strength Deficits   RLE Assessment   RLE Assessment (LE edema)   RLE Overall PROM   R Knee Flexion limited from 0   R Knee Extension tested to 50   R Ankle Dorsiflexion limited from neutral   Strength RLE   R Hip Flexion 4-/5   R Knee Extension 4-/5   R Ankle Dorsiflexion 4-/5   LLE Assessment   LLE Assessment (LE edema)   LLE Overall PROM   L Knee Flexion tested to 60 in sitting   L Knee Extension WFL   L Ankle Dorsiflexion limited from neutral   Strength LLE   L Hip Flexion 4-/5   L Knee Extension 4-/5   L Ankle Dorsiflexion 4-/5   Coordination   Movements are Fluid and Coordinated 0   Coordination and Movement Description bradykinesia   Sensation X  ([place])   Light Touch   RLE Light Touch Grossly intact   LLE Light Touch Grossly intact   Bed Mobility   Supine to Sit 3  Moderate assistance   Additional items Assist x 1; Increased time required;Verbal cues; Bedrails;HOB elevated   Transfers   Sit to Stand 2  Maximal assistance  (from low deck height; mod A of 2 from elevated deck height)   Additional items Assist x 2; Increased time required;Verbal cues;Armrests   Stand to Sit 2  Maximal assistance   Additional items Assist x 2; Increased time required;Verbal cues;Armrests   Additional Comments Virgen lift sling secured in chair for ease of return back to bed  Ambulation/Elevation   Gait pattern Excessively slow; Wide SALLY; Antalgic;Decreased foot clearance;Retropulsion   Gait Assistance 2  Maximal assist   Additional items Assist x 2;Verbal cues   Assistive Device Rolling walker  (short)   Distance 2' with chair follow   Stair Management Assistance Not tested   Balance   Static Sitting Good   Static Standing Poor   Activity Tolerance   Activity Tolerance Patient limited by fatigue;Patient limited by pain   Medical Staff Made Aware spoke to [address] from case management   Nurse Made Aware spoke to [facility] RN and PCA   Assessment   Prognosis Guarded   Problem List Decreased strength;Decreased range of motion;Decreased endurance; Impaired balance;Decreased mobility; Decreased coordination;Decreased cognition;Obesity;Pain;Decreased skin integrity  (gait deviations)   Barriers to Discharge Decreased caregiver support; Inaccessible home environment   Goals   Patient Goals Pt did not express one during PT Eval other than "to get out of here"   STG Expiration Date 08/06/18   Treatment Day 0   Plan   Treatment/Interventions Functional transfer training;LE strengthening/ROM; Therapeutic exercise; Endurance training;Cognitive reorientation;Patient/family training;Bed mobility;Gait training;Equipment eval/education;OT;Spoke to case management;Spoke to nursing   PT Frequency Other (Comment)  (3-5x/wk)   Recommendation   Recommendation Post acute IP rehab;Long-term skilled PT   Equipment Recommended Walker; Wheelchair   Barthel Index   Feeding 10   Bathing 0   Grooming Score 5   Dressing Score 0   Bladder Score 5   Bowels Score 10   Toilet Use Score 0   Transfers (Bed/Chair) Score 5   Mobility (Level Surface) Score 0   Stairs Score 0   Barthel Index Score 35   Pt requires PT /OT co-eval due to signficant assistance with mobility and cognitive-behavioral impairments  (Please find full objective findings from PT assessment regarding body systems outlined above)  Assessment: Pt is a 81 y o  female seen for PT evaluation s/p admit to [facility] on 7/20/2018 w/ Acute kidney injury ([facility] [address] )  Order placed for PT  Prior to admission, pt used rolling walker for mobility and sleeps in recliner  Upon evaluation: Pt requires mod A assistance for bed mobilty and 2-person A assistance for transfers and ambulation with rolling walker    Pt's clinical presentation is currently unstable/unpredictable given the functional mobility deficits above, especially weakness, decreased ROM, decreased skin integrity, decreased endurance, gait deviations, pain, decreased functional mobility tolerance and SOB upon exertion, coupled with fall risks including impaired balance, impaired coordination and decreased cognition, and combined with medical complications of abnormal renal lab values, abnormal H&H and current wound  Pt to benefit from continued skilled PT tx while in hospital and upon DC to address deficits as defined above and maximize level of functional mobility  From PT/mobility standpoint, recommendation at time of d/c would be inpatient rehab and with rolling walker pending progress in order to maximize pt's functional independence and consistency w/ mobility in order to facilitate return to PLOF  Recommend trial with walker next 1-2 sessions, trial with WC next 1-2 sessions, ther ex next 1-2 sessions, OT consult and case management consult  Pt may/will require a hospital bed for the following reasons:Pt has a medical condtion which requires positioning of the body in ways not feasible with an ordinary bed  (Elevation of the head/upper body less that 30 degrees does not usually require the use of hospital bed) and Pt requires a bed height different than a fixed height hospital bed to permit transfers to chair, WC or standing position      Goals: Pt will: Perform bed mobility tasks to Supervision to increase Indep in home environment and decrease burden of care  Perform transfers to Supervision to decrease burden of care and decrease risk for falls  Perform ambulation with rolling walker for >50' to  Supervision  to decrease burden of care, return to multilevel home and increase indep while others at work  Propel WC for >50' w/S to decrease burden of care, decrease risk for falls and improve activity tolerance increase B knee rom to 90 to improve ease of transfers  The following objective measures were performed on IE: Barthel Index 35/100;  Comorbidities affecting pt's physical performance at time of assessment include: DM, HTN, obesity and limited hearing   Personal factors affecting pt at time of IE include: limited home support, advanced age, behavioral pattern, inability to perform IADLs, inability to perform ADLs, limited insight into impairments and sedentary lifestyle       Refugio Navarro, PT, DPT

## 2018-07-23 NOTE — PLAN OF CARE
Problem: OCCUPATIONAL THERAPY ADULT  Goal: Performs self-care activities at highest level of function for planned discharge setting  See evaluation for individualized goals  Treatment Interventions: ADL retraining, Functional transfer training, UE strengthening/ROM, Patient/family training, Equipment evaluation/education, Compensatory technique education, Continued evaluation, Activityengagement  Equipment Recommended: Other (comment) (has DME and grab bars)       See flowsheet documentation for full assessment, interventions and recommendations  Limitation: Decreased ADL status, Decreased UE strength, Decreased endurance, Decreased self-care trans, Decreased high-level ADLs     Assessment: Pt is an 82yo female [facility] on [facility]/2018  Pt presents w/ acute kidney injury and significant PMH impacting her occupational performance including DM, HTN, and stage II decub on buttocks  Per chart, pt has had wound for about one year, and was following up outpatient  Pt and family noticed wound was bleeing at home PTA, and presented to ED  Pt reports living in apt w/ 0 KASI next to son and daughter in law PTA  Pt reports using RW for functional mobility and I w/ self care ADL  Pt reports family / neighbor assist w/ IADL  Upon evaluation, pt required mod A to complete bed mobility, Pt required min A to complete UBD, and max A to complete LBD  Pt required max A x2 to complete sit <> stand from EOB, and was unable to maintain standing to complete SPT  Pt presents w/ decreased activity tolerance, decreased LE ROM / strength, increased pain, decreased UE strength, decreased standing tolerance, decreased standing balance, decreased sitting balance impacting her I w/ dressing, bathing, functional mobility, functional transfers, clothing mgmt, activity engagement, and oral hygiene  Pt would benefit from OT while in acute care to address deficits   From an OT perspective, pt would benefit from post acute rehab when medically stable for discharge from acute care to return to baseline level of I   Will continue to follow pt in acute care     OT Discharge Recommendation: Other (Comment) (to post acute rehab)  OT - OK to Discharge:  (to post acute rehab when medically stable)

## 2018-07-23 NOTE — OCCUPATIONAL THERAPY NOTE
[address] Evaluation     Patient Name: Home Gold  Today's Date: 7/23/2018  Problem List  Patient Active Problem List   Diagnosis    Type 2 diabetes mellitus with diabetic polyneuropathy, without long-term current use of insulin ([facility] [address] )    Decubitus ulcer of buttock, stage 2    Acute kidney injury ([facility] [address] )    Essential hypertension    Morbid obesity ([facility] [address] )    Candidal intertrigo    Ambulatory dysfunction    Anemia    Positive blood culture     Past Medical History  Past Medical History:   Diagnosis Date    Diabetes mellitus ([facility] [address] )     Hyperlipidemia     Hypertension      Past Surgical History  History reviewed  No pertinent surgical history  07/23/18 1508   Note Type   Note type Eval/Treat   Restrictions/Precautions   Other Precautions Bed Alarm; Chair Alarm; Fall Risk;Pain   Pain Assessment   Pain Assessment 0-10  (pt report no pain upon arrival resting in bed)   Pain Score 5   Pain Type Acute pain   Pain Location Knee   Pain Orientation Left  (w/ activity)   Effect of Pain on Daily Activities limits standing tolerance and I w/ LB ADL performance   Patient's Stated Pain Goal No pain   Hospital Pain Intervention(s) Repositioned; Ambulation/increased activity; Emotional support   Response to Interventions tolerated   Home Living   Type of Home Apartment  (next to son and daughter in [place] [address])   Home Layout One level; Other (Comment); Performs ADLs on one level  (0 KASI)   Bathroom Shower/Tub Walk-in shower   Bathroom Toilet Raised   Bathroom Equipment Grab bars in shower; Shower chair;Grab bars around toilet   Bathroom Accessibility Accessible via walker   Home Equipment Walker;Grab bars; Other (Comment)  (lift recliner chair)   Additional Comments Pt reports living alone in one level apt next to her son and daughter in [facility]   Prior Function   Level of [place] Needs assistance with IADLs   Lives With Alone   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; Other (Comment)  (cleaning service)   ADL Assistance Independent   IADLs Needs assistance   Falls in the last 6 months 0   Vocational Retired   Comments Pt reports mostly sedentary and gets up from recliner to use bathroom or open door for son's dog  Pt reports using RW for functional mobility and sleeps in recliner chair  Pt added has been sledeping there for over year   Lifestyle   Autonomy Pt reports I w/ self care ADL and uses RW for functiojnal mobility w/ in apt   Reciprocal Relationships Pt reports supportive and involved family next door   Service to Others Pt reports retired and worked at [employer] reports enjoyign reading and watching tv  Pt added mostly sedentary   Subjective   Subjective I have not been up in few days  I have been usiing bedpan   ADL   Eating Assistance 6  Modified independent   Eating Deficit Setup   Grooming Assistance 5  Supervision/Setup   Grooming Deficit Setup; Increased time to complete   UB Bathing Assistance Unable to assess   LB Bathing Assistance Unable to assess   UB Dressing Assistance 4  Minimal Assistance   LB Dressing Assistance 2  Maximal Assistance   Additional Comments overall max A to LB ADL and min A for UBD   Bed Mobility   Supine to Sit 3  Moderate assistance   Additional items Assist x 1;LE management;Verbal cues; Increased time required;HOB elevated; Bedrails   Sit to Supine Unable to assess   Additional Comments Pt seated at EOB post eval w/ PT, Gerardo felix   Transfers   Sit to Stand 2  Maximal assistance   Additional items Assist x 2; Increased time required;Verbal cues  (pt reports afraid of falling   Unable to maintain stand)   Stand to Sit 2  Maximal assistance   Additional items Assist x 2   Stand pivot Unable to assess  (pt unable to maintain stading or complete stand from EOB)   Balance   Static Sitting Fair   Static Standing Zero   Activity Tolerance   Activity Tolerance Patient limited by fatigue;Patient limited by pain   Medical Staff Made Aware spoke to PT, [facility] [place] [place]   Nurse Made Aware spoke to Jazmyn DEL ANGEL Assessment   RUE Assessment [facility]   RUE Strength   RUE Overall Strength Deficits; Other (Comment)  (at shoulder)   LUE Assessment   LUE Assessment WFL   LUE Strength   LUE Overall Strength Deficits; Other (Comment)  (at shoulder)   Hand Function   Gross Motor Coordination Functional   Sensation   Light Touch No apparent deficits   Sharp/Dull Not tested   Vision-Basic Assessment   Current Vision Wears glasses all the time   Perception   Inattention/Neglect Appears intact   Motor Planning Appears intact   Perseveration Not present   Cognition   Overall Cognitive Status [facility]   Arousal/Participation Alert   Attention Attends with cues to redirect   Orientation Level Oriented to person;Oriented to place;Oriented to time;Oriented to situation   Memory Decreased recall of recent events  (details)   Following Commands Follows multistep commands with increased time or repetition   Comments Identified pt by full name and birthdate  Pt able to participate in conversation and provide limited social history  Pt presents w/ flat affect   Assessment   Limitation Decreased ADL status; Decreased UE strength;Decreased endurance;Decreased self-care trans;Decreased high-level ADLs   Assessment Pt is an 82yo female [facility] on 7/20/2018  Pt presents w/ acute kidney injury and significant PMH impacting her occupational performance including DM, HTN, and stage II decub on buttocks  Per chart, pt has had wound for about one year, and was following up outpatient  Pt and family noticed wound was bleeing at home PTA, and presented to ED  Pt reports living in apt w/ 0 KASI next to son and daughter in law PTA  Pt reports using RW for functional mobility and I w/ self care ADL  Pt reports family / neighbor assist w/ IADL  Upon evaluation, pt required mod A to complete bed mobility, Pt required min A to complete UBD, and max A to complete LBD   Pt required max A x2 to complete sit <> stand from EOB, and was unable to maintain standing to complete SPT  Pt presents w/ decreased activity tolerance, decreased LE ROM / strength, increased pain, decreased UE strength, decreased standing tolerance, decreased standing balance, decreased sitting balance impacting her I w/ dressing, bathing, functional mobility, functional transfers, clothing mgmt, activity engagement, and oral hygiene  Pt would benefit from OT while in acute care to address deficits  From an OT perspective, pt would benefit from post acute rehab when medically stable for discharge from acute care to return to baseline level of I  Will continue to follow pt in acute care   Goals   Patient Goals Pt stated that she wants to get out of here, but added that she has not plans   Plan   Treatment Interventions ADL retraining;Functional transfer training;UE strengthening/ROM; Patient/family training;Equipment evaluation/education; Compensatory technique education;Continued evaluation; Activityengagement   Goal Expiration Date 07/30/18   OT Frequency 3-5x/wk   Additional Treatment Session   Start Time 6703   End Time 1519   Treatment Assessment Pt seen for OT treatmennt session this afternoon w/ PT, Gerardo  Pt benefits from co-tx w/ PT due to level of physical assistance needed   Pt participated in continued evaluation to assess fucntional transfer to bedside recliner chair  Pt required mod A x2 w/ bed height elevated to stand from EOB w/ limited knee flexion  Pt completed functional transfer using RW w/ mod A X 2  Pt engaged in grooming while seated in chair after set- up at tray table  Continue to recommend post acute rehab when medically stable for discharge from acute care   Will continue to follow   Additional Treatment Day 1   Recommendation   OT Discharge Recommendation Other (Comment)  (to post acute rehab)   Equipment Recommended Other (comment)  (has DME and grab bars)   OT - OK to Discharge (to post acute rehab when medically stable)   Barthel Index   Feeding 10 Bathing 0   Grooming Score 5   Dressing Score 0   Bladder Score 5   Bowels Score 10   Toilet Use Score 0   Transfers (Bed/Chair) Score 5   Mobility (Level Surface) Score 0   Stairs Score 0   Barthel Index Score 35   Modified [place] Scale   Modified [place] Scale 4   Pt goals to be met in 7 days:  1  Pt will complete bed mobility supine <> sit w/ min A to engage in ADL's while seated unsupported at EOB to max I to return to home alone environment  2  Pt will demonstrate good static seated balance at EOB for at least 20 minutes unsupported to max I w/ ADL performance to return to PLOF  3  Pt will complete functional transfers to bed, chair, and commode using AD w/ min A x1  4  Pt will complete LBD w/ min A x1 to return to PLOF in home alone environment  5  Pt will complete UBD w/ mod I after set- up  6  Pt will demonstrate increased functional standing tolerance for at least 5 minutes to complete oral hygiene w/ S after set- up  7   Pt will demonstrate good attention and verbalize understanding of safety precautions and importance of frequent position changes to max I w/ ADL performance    Rubina Bradshaw, OTR/L

## 2018-07-23 NOTE — ASSESSMENT & PLAN NOTE
· Baseline hgb 11-12, stable around 8  · Suspect dilutional and 2/2 bleeding decub ulcer   · Check am cbc, hemetest stool and check iron panel

## 2018-07-23 NOTE — CASE MANAGEMENT
Giovani Zaman, RN Registered Nurse Signed   Case Management Date of Service: 7/21/2018  4:39 PM         []Hide copied text     Thank you,  Njmarissa Aqq  [facility] Utilization Review Department  Phone: [phone]; Fax [phone]  ATTENTION: The Network Utilization Review Department is now centralized for our 9 Facilities  Make a note that we have a new phone and fax numbers for our Department  Please call with any questions or concerns to [phone] and carefully follow the prompts so that you are directed to the right person  All voicemails are confidential  Fax any determinations, approvals, denials, and requests for initial or continue stay review clinical to [phone]  Due to HIGH CALL volume, it would be easier if you could please send faxed requests to expedite your requests and in part, help us provide discharge notifications faster            Initial Clinical Review     Admission: Date/Time/Statement:   7/20/18 AT 1722            Orders Placed This Encounter   Procedures    Inpatient Admission (expected length of stay for this patient is greater than two midnights)       Standing Status:   Standing       Number of Occurrences:   1       Order Specific Question:   Admitting Physician       Answer:   Jamal Scott [[id number]]       Order Specific Question:   Level of Care       Answer:   Med Surg [16]       Order Specific Question:   Estimated length of stay       Answer:   More than 2 Midnights       Order Specific Question:   Certification       Answer:   I certify that inpatient services are medically necessary for this patient for a duration of greater than two midnights   See H&P and MD Progress Notes for additional information about the patient's course of treatment          ED: Date/Time/Mode of Arrival:             ED Arrival Information      Expected Arrival Acuity Means of Arrival Escorted By Service Admission Type     - 7/20/2018 14:39 Urgent Ambulance [facility] General Medicine Urgent     Arrival Complaint     -             Chief Complaint:        Chief Complaint   Patient presents with    Weakness - Generalized       Pt c/o right leg weakness starting a few days ago  States today she was unable to get up from her chair  Also bleeding from wound on buttocks           Chief Complaint:   My buttock is bleeding     History of Present Illness:   Froilan Real a 81 y  o  female who presents with bleeding ulcer   Patient lives in apartment next to son apparently has had a decubitus ulcer for some time  Elida Chen was noted that the ulcer was oozing blood patient was brought to the hospital for evaluation   She denies fevers chills night sweats nausea vomiting diarrhea no chest pain or palpitations   In the ER labs revealed a elevated creatinine   Of note patient's baseline creatinine through review of [facility] records reveals creatinine of 1 6 in January this year   Creatinine ER noted to be 3 6         Review of Systems:   Constitutional: Negative for chills, diaphoresis, fatigue and fever  Respiratory: Negative for cough, chest tightness, shortness of breath and stridor     Cardiovascular: Positive for leg swelling (Chronic lower extremity edema)  Negative for chest pain  Musculoskeletal: Positive for gait problem  Patient walks with walker  Skin: Positive for wound ( positive bleeding wound to left buttock)  Neurological: Negative for seizures and syncope          Physical Exam   Constitutional: No distress  Cardiovascular: Normal rate   Exam reveals no gallop and no friction rub     No murmur heard  Pulmonary/Chest: Effort normal and breath sounds normal  No respiratory distress  She has no wheezes  She has no rales  Musculoskeletal: She exhibits edema (1+ edema bilateral lower extremities)           ED Vital Signs:            ED Triage Vitals   Temperature Pulse Respirations Blood Pressure SpO2   07/20/18 1450 07/20/18 1450 07/20/18 1450 07/20/18 1450 07/20/18 1450   98 3 °F (36 8 °C) 80 16 116/78 98 %       Temp Source Heart Rate Source Patient Position - Orthostatic VS BP Location FiO2 (%)   07/20/18 1450 -- 07/20/18 2219 07/20/18 1806 --   Oral   Lying Left arm         Pain Score           07/20/18 1450           No Pain                Wt Readings from Last 1 Encounters:   07/20/18 95 9 kg (211 lb 6 7 oz)        07/20 0701 07/21 0700 07/21 0701 07/21 1644   Most Recent     Temperature (°F) 97 798 6 97 598  1   97 5 (36 4)     Pulse 7180 7071   71     Respirations 1618 1618   16     Blood Pressure 99/51116/78 111/56133/58   133/58     SpO2 (%) 9799 9599   95           LABS/Diagnostic Test Results:   Results from last 7 days  Lab Units 07/20/18  1623   WBC Thousand/uL 10 62*   HEMOGLOBIN g/dL 10 6*   HEMATOCRIT % 32 1*   PLATELETS Thousands/uL 404*   NEUTROS PCT % 78*   LYMPHS PCT % 10*   MONOS PCT % 9   EOS PCT % 3       Results from last 7 days  Lab Units 07/20/18  1623   SODIUM mmol/L 131*   POTASSIUM mmol/L 4 6   CHLORIDE mmol/L 94*   CO2 mmol/L 30   BUN mg/dL 77*   CREATININE mg/dL 3 56*   CALCIUM mg/dL 9 7   TOTAL PROTEIN g/dL 7 4   BILIRUBIN TOTAL mg/dL 0 40   ALK PHOS U/L 87   ALT U/L 15   AST U/L 13   GLUCOSE RANDOM mg/dL 287*      B-type natriuretic peptide [[id number]] (Abnormal) Collected: 07/20/18 1623   Lab Status: Final result Specimen: Blood from Arm, Right Updated: 07/20/18 1653     NT-proBNP 576 (H) <450 pg/mL          POC Glucose 215 (H) 65 - 140 mg/dl        Urine Macroscopic [[id number]] (Abnormal) Collected: 07/20/18 1751   Lab Status: Final result Specimen: Urine Updated: 07/20/18 1743     Color, UA Yellow     Clarity, UA Cloudy     pH, UA 6 0 4 5 - 8 0       Leukocytes, UA Large (A) Negative       Nitrite, UA Positive (A) Negative       Protein, UA Negative Negative mg/dl       Glucose, UA Negative Negative mg/dl       Ketones, UA Negative Negative mg/dl       Urobilinogen, UA 0 2 0 2, 1 0 E U /dl E U /dl       Bilirubin, UA Negative Negative       Blood, UA Small (A) Negative       Specific Gravity, UA 1 010 1 003 - 1 030             Urine Microscopic [[id number]] (Abnormal) Collected: 07/20/18 1751   Lab Status: Final result Specimen: Urine from Urine, Clean Catch Updated: 07/20/18 1827     RBC, UA 0-5 None Seen, 0-5 /hpf       WBC, UA 30-50 (A) None Seen, 0-5, 5-55, 5-65 /hpf       Epithelial Cells Occasional None Seen, Occasional /hpf       Bacteria, UA Innumerable (A)          Microbiology Results (last 21 days)      Procedure Component Value - Date/Time   Urine culture [[id number]] Collected: 07/20/18 1751   Lab Status: In process Specimen: Urine from Urine, Clean Catch Updated: 07/20/18 1827   Blood culture #1 [[id number]] Collected: 07/20/18 1626   Lab Status:  In process Specimen: Blood from Hand, Left Updated: 07/20/18 1631   Blood culture #2 [[id number]] Collected: 07/20/18 1623   Lab Status: Preliminary result Specimen: Blood from Arm, Right Updated: 07/21/18 1550     Gram Stain Result Gram positive cocci in clusters         RENAL ULTRASOUND -  Atrophic left kidney with renal cortical thinning   Echogenic renal cortices bilaterally consistent with medical renal disease   No hydronephrosis      ED Treatment:              Medication Administration from 07/20/2018 1439 to 07/20/2018 1801        Date/Time Order Dose Route Action Action by Comments       07/20/2018 1615 nystatin (MYCOSTATIN) cream 2 application Topical Given Humberto Mariee RN         07/20/2018 1800 prednisoLONE acetate (PRED FORTE) 1 % ophthalmic suspension 1 drop 1 drop Both Eyes Given Evangelista Shields RN               Past Medical/Surgical History:        Resolved Ambulatory Problems     Diagnosis Date Noted    No Resolved Ambulatory Problems           Past Medical History:   Diagnosis Date    Diabetes mellitus ([facility] [address] )      Hyperlipidemia      Hypertension           Admitting Diagnosis: Intertrigo [L30 4]  Weakness [R53 1]  Acute renal failure (ARF) ([facility] ) [N17 9]  Pressure sore [L89 90]  Decubitus ulcer of left buttock, stage 2 [L89 322]  Ambulatory dysfunction [R26 2]     Age/Sex: 81 y o  female        Assessment/Plan:       * Acute kidney injury ([facility] )   Assessment & Plan     · Review of old records from [facility] office note in January 2018 patient with creatinine 1 6 now noted to have creatinine of 3 5  · Hold nephrotoxin medications including Lasix, Ace inhibitor, hydrochlorothiazide  · Review of [facility] records reveal Lasix was for chronic venous insufficiency no mention of heart failure   · Check renal ultrasound  · IV hydration  · Repeat labs in a m  · Consider renal evaluation of labs not improving          Decubitus ulcer of buttock, stage 2   Assessment & Plan     · Will ask surgical evaluation for wound care    · No indication for systemic antibiotics at this time          Type 2 diabetes mellitus with diabetic polyneuropathy, without long-term current use of insulin ([facility])   Assessment & Plan     No results found for: HGBA1C     No results for input(s): POCGLU in the last 72 hours      Blood Sugar Average: Last 72 hrs:     Hemoglobin A1c 6 9 in July this year  Hold Tradjenta  Fingerstick with sliding scale coverage                 Essential hypertension   Assessment & Plan     · Continue beta-blocker  · Hold Ace/HCTZ          Candidal intertrigo   Assessment & Plan     · Nystatin powder          Morbid obesity ([facility] )   Assessment & Plan     · Dietary maneuvers reviewed             VTE Prophylaxis: Heparin  / sequential compression device      Code Status:  Full code     Anticipated Length of Stay: Christine Hurtado will be admitted on an Inpatient basis with an anticipated length of stay of  greater than 2 midnights      Justification for Hospital Stay:  Patient with acute renal failure requiring IV fluid renal ultrasound possible subspecialty evaluation            Admission Orders:  7/20/18 AT 1722   ADMIT INPATIENT  VS Q4HRS     Turn q2hrs  Up with Assistance  SCD     Diet Nura/CHO Controlled; Consistent Carbohydrate Diet Level 2 (5 carb servings/75 grams CHO/meal)       Continuous IV Infusions:    IVF sodium chloride 0 9 % infusion at 75 cc/hr     Scheduled Meds:   Current Facility-Administered Medications:  acetaminophen 650 mg Oral Q6H PRN Pernell Perkins MD   aspirin 81 mg Oral Daily Jamarcus Ferrer MD   bimatoprost 1 drop Both Eyes HS Pernell Perkins MD   cholecalciferol 1,000 Units Oral Daily Nate Ferrer MD   docusate sodium 100 mg Oral BID PRN Pernell Perkins MD   heparin (porcine) 5,000 Units Subcutaneous Q8H [address] MD Carissa   HYDROcodone-acetaminophen 1 tablet Oral BID PRN Nate Ferrer MD   insulin lispro 1-5 Units Subcutaneous TID AC Jamarcus Ferrer MD   insulin lispro 1-5 Units Subcutaneous HS Pernell Perkins MD   levothyroxine 75 mcg Oral Early Morning Pernell Perkins MD   metoprolol tartrate 25 mg Oral Q12H [address] Pernell Perkins MD   nystatin   Topical BID Nate Ferrer MD   ondansetron 4 mg Intravenous Q6H PRN Pernell Perkins MD   pravastatin 40 mg Oral Daily With Travis Shelton MD   prednisoLONE acetate 1 drop Both Eyes BID Pernell Perkins MD   timolol 1 drop Right Eye Daily Pernell Perkins MD         PRN Meds:     acetaminophen    docusate sodium    HYDROcodone-acetaminophen    ondansetron     CONSULT RENAL     PT EVAL     OT EVAL

## 2018-07-23 NOTE — ASSESSMENT & PLAN NOTE
· 1/2 blood culture from admission positive for staph aureus   · Afebrile without leukocytosis   Possibly skin contaminant vs cutaneous etiology from wound

## 2018-07-23 NOTE — ASSESSMENT & PLAN NOTE
No results found for: HGBA1C    Recent Labs      07/22/18   1612  07/22/18   2126  07/23/18   0724  07/23/18   1102   POCGLU  161*  138  99  152*     Blood Sugar Average: Last 72 hrs:  · Hemoglobin A1c 6 9 in July this year  · Hold Tradjenta  · [facility], accucheks   (P) [phone]

## 2018-07-23 NOTE — PLAN OF CARE
Problem: PHYSICAL THERAPY ADULT  Goal: Performs mobility at highest level of function for planned discharge setting  See evaluation for individualized goals  Treatment/Interventions: Functional transfer training, LE strengthening/ROM, Therapeutic exercise, Endurance training, Cognitive reorientation, Patient/family training, Bed mobility, Gait training, Equipment eval/education, OT, Spoke to case management, Spoke to nursing  Equipment Recommended: Yesika Loco, Wheelchair       See flowsheet documentation for full assessment, interventions and recommendations  Prognosis: Guarded  Problem List: Decreased strength, Decreased range of motion, Decreased endurance, Impaired balance, Decreased mobility, Decreased coordination, Decreased cognition, Obesity, Pain, Decreased skin integrity (gait deviations)  Assessment: Assessment: Pt is a 81 y o  female seen for PT evaluation s/p admit to [facility] on 7/20/2018 w/ Acute kidney injury ([facility] [address] )  Order placed for PT  Prior to admission, pt used rolling walker for mobility and sleeps in recliner  Upon evaluation: Pt requires mod A assistance for bed mobilty and 2-person A assistance for transfers and ambulation with rolling walker  Pt's clinical presentation is currently unstable/unpredictable given the functional mobility deficits above, especially weakness, decreased ROM, decreased skin integrity, decreased endurance, gait deviations, pain, decreased functional mobility tolerance and SOB upon exertion, coupled with fall risks including impaired balance, impaired coordination and decreased cognition, and combined with medical complications of abnormal renal lab values, abnormal H&H and current wound  Pt to benefit from continued skilled PT tx while in hospital and upon DC to address deficits as defined above and maximize level of functional mobility   From PT/mobility standpoint, recommendation at time of d/c would be inpatient rehab and with rolling walker pending progress in order to maximize pt's functional independence and consistency w/ mobility in order to facilitate return to PLOF  Recommend trial with walker next 1-2 sessions, trial with WC next 1-2 sessions, ther ex next 1-2 sessions, OT consult and case management consult  Pt may/will require a hospital bed for the following reasons:Pt has a medical condtion which requires positioning of the body in ways not feasible with an ordinary bed  (Elevation of the head/upper body less that 30 degrees does not usually require the use of hospital bed) and Pt requires a bed height different than a fixed height hospital bed to permit transfers to chair, WC or standing position  Barriers to Discharge: Decreased caregiver support, Inaccessible home environment     Recommendation: Post acute IP rehab, Long-term skilled PT          See flowsheet documentation for full assessment

## 2018-07-24 LAB
ANION GAP SERPL CALCULATED.3IONS-SCNC: 7 MMOL/L (ref 4–13)
BACTERIA UR QL AUTO: ABNORMAL /HPF
BILIRUB UR QL STRIP: NEGATIVE
BUN SERPL-MCNC: 30 MG/DL (ref 5–25)
CALCIUM SERPL-MCNC: 9 MG/DL (ref 8.3–10.1)
CHLORIDE SERPL-SCNC: 105 MMOL/L (ref 100–108)
CLARITY UR: ABNORMAL
CO2 SERPL-SCNC: 28 MMOL/L (ref 21–32)
COLOR UR: YELLOW
CREAT SERPL-MCNC: 1.62 MG/DL (ref 0.6–1.3)
ERYTHROCYTE [DISTWIDTH] IN BLOOD BY AUTOMATED COUNT: 13.2 % (ref 11.6–15.1)
FERRITIN SERPL-MCNC: 182 NG/ML (ref 8–388)
GFR SERPL CREATININE-BSD FRML MDRD: 29 ML/MIN/1.73SQ M
GLUCOSE SERPL-MCNC: 177 MG/DL (ref 65–140)
GLUCOSE SERPL-MCNC: 192 MG/DL (ref 65–140)
GLUCOSE SERPL-MCNC: 203 MG/DL (ref 65–140)
GLUCOSE SERPL-MCNC: 236 MG/DL (ref 65–140)
GLUCOSE SERPL-MCNC: 313 MG/DL (ref 65–140)
GLUCOSE UR STRIP-MCNC: ABNORMAL MG/DL
HCT VFR BLD AUTO: 29 % (ref 34.8–46.1)
HCT VFR BLD AUTO: 30.2 % (ref 34.8–46.1)
HGB BLD-MCNC: 9.1 G/DL (ref 11.5–15.4)
HGB BLD-MCNC: 9.5 G/DL (ref 11.5–15.4)
HGB UR QL STRIP.AUTO: ABNORMAL
IRON SATN MFR SERPL: 17 %
IRON SERPL-MCNC: 32 UG/DL (ref 50–170)
KETONES UR STRIP-MCNC: NEGATIVE MG/DL
LEUKOCYTE ESTERASE UR QL STRIP: ABNORMAL
MCH RBC QN AUTO: 29.4 PG (ref 26.8–34.3)
MCHC RBC AUTO-ENTMCNC: 31.4 G/DL (ref 31.4–37.4)
MCV RBC AUTO: 94 FL (ref 82–98)
NITRITE UR QL STRIP: NEGATIVE
NON-SQ EPI CELLS URNS QL MICRO: ABNORMAL /HPF
PH UR STRIP.AUTO: 7.5 [PH] (ref 4.5–8)
PLATELET # BLD AUTO: 388 THOUSANDS/UL (ref 149–390)
PMV BLD AUTO: 8.9 FL (ref 8.9–12.7)
POTASSIUM SERPL-SCNC: 4.2 MMOL/L (ref 3.5–5.3)
PROT UR STRIP-MCNC: ABNORMAL MG/DL
RBC # BLD AUTO: 3.09 MILLION/UL (ref 3.81–5.12)
RBC #/AREA URNS AUTO: ABNORMAL /HPF
SODIUM SERPL-SCNC: 140 MMOL/L (ref 136–145)
SP GR UR STRIP.AUTO: 1.02 (ref 1–1.03)
TIBC SERPL-MCNC: 192 UG/DL (ref 250–450)
UROBILINOGEN UR QL STRIP.AUTO: 0.2 E.U./DL
WBC # BLD AUTO: 8.58 THOUSAND/UL (ref 4.31–10.16)
WBC #/AREA URNS AUTO: ABNORMAL /HPF
WBC CLUMPS # UR AUTO: PRESENT /UL

## 2018-07-24 PROCEDURE — 83540 ASSAY OF IRON: CPT | Performed by: NURSE PRACTITIONER

## 2018-07-24 PROCEDURE — 85018 HEMOGLOBIN: CPT | Performed by: INTERNAL MEDICINE

## 2018-07-24 PROCEDURE — 83550 IRON BINDING TEST: CPT | Performed by: NURSE PRACTITIONER

## 2018-07-24 PROCEDURE — 82728 ASSAY OF FERRITIN: CPT | Performed by: NURSE PRACTITIONER

## 2018-07-24 PROCEDURE — 85027 COMPLETE CBC AUTOMATED: CPT | Performed by: NURSE PRACTITIONER

## 2018-07-24 PROCEDURE — 80048 BASIC METABOLIC PNL TOTAL CA: CPT | Performed by: NURSE PRACTITIONER

## 2018-07-24 PROCEDURE — 99232 SBSQ HOSP IP/OBS MODERATE 35: CPT | Performed by: INTERNAL MEDICINE

## 2018-07-24 PROCEDURE — 81001 URINALYSIS AUTO W/SCOPE: CPT | Performed by: INTERNAL MEDICINE

## 2018-07-24 PROCEDURE — 82948 REAGENT STRIP/BLOOD GLUCOSE: CPT

## 2018-07-24 PROCEDURE — 85014 HEMATOCRIT: CPT | Performed by: INTERNAL MEDICINE

## 2018-07-24 RX ORDER — CALCIUM CARBONATE 200(500)MG
500 TABLET,CHEWABLE ORAL DAILY PRN
Status: DISCONTINUED | OUTPATIENT
Start: 2018-07-24 | End: 2018-07-27 | Stop reason: HOSPADM

## 2018-07-24 RX ORDER — INSULIN GLARGINE 100 [IU]/ML
4 INJECTION, SOLUTION SUBCUTANEOUS
Status: DISCONTINUED | OUTPATIENT
Start: 2018-07-24 | End: 2018-07-25

## 2018-07-24 RX ADMIN — CALCIUM CARBONATE 500 MG: 500 TABLET, CHEWABLE ORAL at 22:45

## 2018-07-24 RX ADMIN — METOPROLOL TARTRATE 25 MG: 25 TABLET ORAL at 21:25

## 2018-07-24 RX ADMIN — BIMATOPROST 1 DROP: 0.1 SOLUTION/ DROPS OPHTHALMIC at 21:25

## 2018-07-24 RX ADMIN — INSULIN LISPRO 1 UNITS: 100 INJECTION, SOLUTION INTRAVENOUS; SUBCUTANEOUS at 21:25

## 2018-07-24 RX ADMIN — TIMOLOL MALEATE 1 DROP: 5 SOLUTION/ DROPS OPHTHALMIC at 09:37

## 2018-07-24 RX ADMIN — PREDNISOLONE ACETATE 1 DROP: 10 SUSPENSION/ DROPS OPHTHALMIC at 21:25

## 2018-07-24 RX ADMIN — INSULIN GLARGINE 4 UNITS: 100 INJECTION, SOLUTION SUBCUTANEOUS at 21:25

## 2018-07-24 RX ADMIN — INSULIN LISPRO 2 UNITS: 100 INJECTION, SOLUTION INTRAVENOUS; SUBCUTANEOUS at 17:41

## 2018-07-24 RX ADMIN — INSULIN LISPRO 1 UNITS: 100 INJECTION, SOLUTION INTRAVENOUS; SUBCUTANEOUS at 09:36

## 2018-07-24 RX ADMIN — INSULIN LISPRO 3 UNITS: 100 INJECTION, SOLUTION INTRAVENOUS; SUBCUTANEOUS at 12:35

## 2018-07-24 RX ADMIN — PRAVASTATIN SODIUM 40 MG: 40 TABLET ORAL at 17:42

## 2018-07-24 RX ADMIN — LEVOTHYROXINE SODIUM 75 MCG: 75 TABLET ORAL at 06:31

## 2018-07-24 RX ADMIN — NYSTATIN: 100000 POWDER TOPICAL at 17:42

## 2018-07-24 RX ADMIN — VITAMIN D, TAB 1000IU (100/BT) 1000 UNITS: 25 TAB at 09:36

## 2018-07-24 RX ADMIN — METOPROLOL TARTRATE 25 MG: 25 TABLET ORAL at 09:40

## 2018-07-24 RX ADMIN — PREDNISOLONE ACETATE 1 DROP: 10 SUSPENSION/ DROPS OPHTHALMIC at 09:37

## 2018-07-24 RX ADMIN — ASPIRIN 81 MG: 81 TABLET, COATED ORAL at 09:36

## 2018-07-24 RX ADMIN — NYSTATIN: 100000 POWDER TOPICAL at 09:00

## 2018-07-24 NOTE — ASSESSMENT & PLAN NOTE
· Baseline hgb 11-12, stable around 8  · Suspect dilutional and 2/2 bleeding decub ulcer   · F/u hemetest

## 2018-07-24 NOTE — PROGRESS NOTES
PCA placed pt on bed pan  Pt stated she felt like she had to have a bowel movement  Bedpan noted to be filled with blood and a large blood clot  Unclear where blood came from  Bedpan contents shown to Dr Ling Gómez  Will monitor and await further orders

## 2018-07-24 NOTE — ASSESSMENT & PLAN NOTE
· On CKD-3 with baseline creatinine 1 3 to 1 6 per [facility] records  · BERTHA resolved  · Continue to hold Lasix, Ace inhibitor, hydrochlorothiazide  · Per PCP note, Lasix 40mg/d was for chronic venous insufficiency, no mention of heart failure     · Renal ultrasound with medical renal disease   · UA no proteinuria

## 2018-07-24 NOTE — PROGRESS NOTES
Progress Note - General Surgery  Ishan Gary 81 y o  female MRN: [id number]  Unit/Bed#: -01 Encounter: [id number]    Assessment:  81y o -year-old female with acute kidney injury, positive blood culture, anemia, and stage II left buttock decubitus ulcer    Plan:  1  Stage II left buttock decubitus ulcer   - please cover with exuderm or duoderm and change every 72 hours   - frequent turning   - ambulate   - likely source of bleeding seen today     2  Anemia   - hgb is 9 5 and stable   - if concerned for GI bleed, recommend sending hemoccult     3  Disposition   - per primary team    Evangelina Martinez MD PGY-5  7:14 PM  07/24/18      Subjective:  Patient seen and examined at request of primary team for blood on bed  Patient denies any bowel movement in last two days but says it was regular and she thinks the blood came from her known sacral decubitus ulcer  Primary team concerned about source of blood  Objective:  Patient Vitals for the past 24 hrs:   BP Temp Temp src Pulse Resp SpO2   07/24/18 1515 138/57 98 8 °F (37 1 °C) Oral 75 18 96 %   07/24/18 0713 113/57 98 9 °F (37 2 °C) Oral 76 18 96 %   07/23/18 2229 141/64 98 8 °F (37 1 °C) Oral 86 18 97 %          Diet Orders            Start     Ordered    07/20/18 1821  Room Service  Once     Question:  Type of Service  Answer:  Room Service - Appropriate with Assistance    07/20/18 1820    07/20/18 1818  Diet Nura/CHO Controlled; Consistent Carbohydrate Diet Level 2 (5 carb servings/75 grams CHO/meal)  Diet effective now     Question Answer Comment   Diet Type Nura/CHO Controlled    Nura/CHO Controlled Consistent Carbohydrate Diet Level 2 (5 carb servings/75 grams CHO/meal)    RD to adjust diet per protocol?  Yes        07/20/18 1817        Intake/Output Summary (Last 24 hours) at 07/24/18 1914  Last data filed at 07/24/18 1706   Gross per 24 hour   Intake                0 ml   Output             1450 ml   Net            -1450 ml   UOP 1 2     Physical Exam:  General: NAD  Cardiovascular: RRR  Respiratory: breath sounds b/l  Abdomen: soft, NT, ND  Extremities: left buttock decubitus ulcer     Medications:    Current Facility-Administered Medications:  acetaminophen 650 mg Oral Q6H PRN Jennifer Alston MD   aspirin 81 mg Oral Daily Jamarcus Ferrer MD   bimatoprost 1 drop Both Eyes HS Jennifer Alston MD   cholecalciferol 1,000 Units Oral Daily Jennifer Alston MD   docusate sodium 100 mg Oral BID PRN Jennifer Alston MD   HYDROcodone-acetaminophen 1 tablet Oral BID PRN Jennifer Alston MD   insulin glargine 4 Units Subcutaneous HS Dexter Ibarra MD   insulin lispro 1-5 Units Subcutaneous TID AC Jamarcus Ferrer MD   insulin lispro 1-5 Units Subcutaneous HS Jennifer Alston MD   levothyroxine 75 mcg Oral Early Morning Jennifer Alston MD   metoprolol tartrate 25 mg Oral Q12H SABRINA Jennifer Alston MD   nystatin  Topical BID Benjamin Kandace Ferrer MD   ondansetron 4 mg Intravenous Q6H PRN Jennifer Alston MD   pravastatin 40 mg Oral Daily With Shahla Tee MD   prednisoLONE acetate 1 drop Both Eyes BID Jennifer Alston MD   timolol 1 drop Right Eye Daily Jennifer Alston MD      acetaminophen 650 mg Q6H PRN   docusate sodium 100 mg BID PRN   HYDROcodone-acetaminophen 1 tablet BID PRN   ondansetron 4 mg Q6H PRN     Laboratory results:   CBC:   Lab Results   Component Value Date    WBC 8 58 07/24/2018    HGB 9 5 (L) 07/24/2018    HCT 30 2 (L) 07/24/2018    MCV 94 07/24/2018     07/24/2018    MCH 29 4 07/24/2018    MCHC 31 4 07/24/2018    RDW 13 2 07/24/2018    MPV 8 9 07/24/2018   , CMP:   Lab Results   Component Value Date     07/24/2018    K 4 2 07/24/2018     07/24/2018    CO2 28 07/24/2018    ANIONGAP 7 07/24/2018    BUN 30 (H) 07/24/2018    CREATININE 1 62 (H) 07/24/2018    GLUCOSE 192 (H) 07/24/2018    CALCIUM 9 0 07/24/2018    EGFR 29 07/24/2018   , Coagulation: No results found for: PT, INR, APTT, Urinalysis:   Lab Results   Component Value Date    COLORU Yellow 07/24/2018    CLARITYU Slightly Cloudy 07/24/2018    SPECGRAV 1 020 07/24/2018    PHUR 7 5 07/24/2018    LEUKOCYTESUR Large (A) 07/24/2018    NITRITE Negative 07/24/2018    PROTEINUA 30 (1+) (A) 07/24/2018    GLUCOSEU 250 (1/4%) (A) 07/24/2018    KETONESU Negative 07/24/2018    BILIRUBINUR Negative 07/24/2018    BLOODU Large (A) 07/24/2018   , Amylase: No results found for: AMYLASE, Lipase: No results found for: LIPASE    VTE Pharmacologic Prophylaxis: Reason for no pharmacologic prophylaxis per primary team  VTE Mechanical Prophylaxis: sequential compression device

## 2018-07-24 NOTE — ASSESSMENT & PLAN NOTE
Recent Labs      07/23/18 2052 07/24/18   0714  07/24/18   1132  07/24/18   1557   POCGLU  224*  177*  313*  236*     Blood Sugar Average: Last 72 hrs:  · Hemoglobin A1c 6 9 in July this year  · Hold Tradjenta  · SSI, accsilvia   (P) 198 6     Plan: Add Lantus hs, continue SSI

## 2018-07-24 NOTE — ASSESSMENT & PLAN NOTE
· 1/2 blood culture from admission positive for staph aureus   · Afebrile without leukocytosis   Possibly skin contaminant

## 2018-07-24 NOTE — ASSESSMENT & PLAN NOTE
· POA, presented from home with stage 2  · Duoderm q72 hrs, frequent repositioning, pillows for offloading recommended by surgery  · Large clot noticed in the pan after urinating when she attempted a bowel movement - possibly from decubitus ulcer - discussed with surgery - will evaluate patient today  · No indication for systemic antibiotics at this time  · Serial exams

## 2018-07-24 NOTE — PLAN OF CARE
Problem: DISCHARGE PLANNING - CARE MANAGEMENT  Goal: Discharge to post-acute care or home with appropriate resources  INTERVENTIONS:  - Conduct assessment to determine patient/family and health care team treatment goals, and need for post-acute services based on payer coverage, community resources, and patient preferences, and barriers to discharge  - Address psychosocial, clinical, and financial barriers to discharge as identified in assessment in conjunction with the patient/family and health care team  - Arrange appropriate level of post-acute services according to patients   needs and preference and payer coverage in collaboration with the physician and health care team  - Communicate with and update the patient/family, physician, and health care team regarding progress on the discharge plan  - Arrange appropriate transportation to post-acute venues  Outcome: Progressing  LOS 4 days  Pt is not a bundle or a readmission  Pt lives alone in an apartment attached to her sons house in [facility] with 0 KASI  She uses a walker to ambulate and a shower chair  She has a w/c that she does not use  Pt states that she washes herself but that her son provides all of meals and she has a cleaning person  She has no prior hx of HHC or rehab stays, uses [facility] [address], has Rx insurance and no difficulty affording meds  Pt denies any hx of mental illness or D&A abuse, her son Gilford Boom is her POA and copy of this document was requested  Pt is retired and has a friend who transports her  Recommended dcp is [facility]  Pt is agreeable  SNF list was reviewed and pt would like to discuss options with her son  I s/w pt's son, Gilford Boom, via telephone to discuss above info  Gilford Boom is agreeable to STR  Discussed SNF list with him  Per Gilford Boom, referrals can be placed to [facility] as 1st choice, [facility] as 2nd, [address] [place] [address] as 3rd, and [facility] as 4th   He would also like transport set up for the patient to get to rehab  DC & rehab process thoroughly explained to son  Referrals placed to above listed SNFs via [facility]  Awaiting responses  Pt will require insurance auth prior to transfer  CM dept will follow for dispo

## 2018-07-24 NOTE — SOCIAL WORK
LOS 4 days  Pt is not a bundle or a readmission  Pt lives alone in an apartment attached to her sons house in [facility] with 0 KASI  She uses a walker to ambulate and a shower chair  She has a w/c that she does not use  Pt states that she washes herself but that her son provides all of meals and she has a cleaning person  She has no prior hx of HHC or rehab stays, uses [facility] [address], has Rx insurance and no difficulty affording meds  Pt denies any hx of mental illness or D&A abuse, her son Sisi Medina is her POA and copy of this document was requested  Pt is retired and has a friend who transports her  Recommended dcp is STR  Pt is agreeable  SNF list was reviewed and pt would like to discuss options with her son  I s/w pt's son, Sisi Medina, via telephone to discuss above info  Sisi Medina is agreeable to STR  Discussed SNF list with him  Per Sisi Medina, referrals can be placed to [facility] as 1st choice, [facility] as 2nd, [facility] as 3rd, and [address] [place] [address] as 4th  He would also like transport set up for the patient to get to rehab  DC & rehab process thoroughly explained to son  Referrals placed to above listed SNFs via [facility]  Awaiting responses  Pt will require insurance auth prior to transfer  CM dept will follow for dispo

## 2018-07-24 NOTE — PROGRESS NOTES
Progress Note - Cristophera Shadow 1935, 81 y o  female MRN: [id number]    Unit/Bed#: -01 Encounter: [id number]    Primary Care Provider: Dasha Schulz MD   Date and time admitted to hospital: 7/20/2018  2:39 PM        Decubitus ulcer of buttock, stage 2   Assessment & Plan    · POA, presented from home with stage 2  · Duoderm q72 hrs, frequent repositioning, pillows for offloading recommended by surgery  · Large clot noticed in the pan after urinating when she attempted a bowel movement - possibly from decubitus ulcer - discussed with surgery - will evaluate patient today  · No indication for systemic antibiotics at this time  · Serial exams         * Acute kidney injury ([facility] [address] )   Assessment & Plan    · On CKD-3 with baseline creatinine 1 3 to 1 6 per [facility] records  · BERTHA resolved  · Continue to hold Lasix, Ace inhibitor, hydrochlorothiazide  · Per PCP note, Lasix 40mg/d was for chronic venous insufficiency, no mention of heart failure  · Renal ultrasound with medical renal disease   · UA no proteinuria           Positive blood culture   Assessment & Plan    · 1/2 blood culture from admission positive for staph aureus   · Afebrile without leukocytosis   Possibly skin contaminant         Ambulatory dysfunction   Assessment & Plan    · PT, OT recommend inpatient rehab - referrals sent per discussion with son        Type 2 diabetes mellitus with diabetic polyneuropathy, without long-term current use of insulin [facility])   Assessment & Plan      Recent Labs      07/23/18   2052  07/24/18   0714  07/24/18   1132  07/24/18   1557   POCGLU  224*  177*  313*  236*     Blood Sugar Average: Last 72 hrs:  · Hemoglobin A1c 6 9 in July this year  · Hold Tradjenta  · SSI, accucheks   (P) 198 6     Plan: Add Lantus hs, continue SSI        Essential hypertension   Assessment & Plan    · Continue beta-blocker  · Hold Ace/HCTZ  · BP acceptable        Anemia   Assessment & Plan    · Baseline hgb 11-12, stable around 8  · Suspect dilutional and 2/2 bleeding decub ulcer   · F/u hemetest        Candidal intertrigo   Assessment & Plan    · Nystatin powder  · Wound care c/s         Morbid obesity ([address] )   Assessment & Plan    · Recommend diet and lifestyle modifications   · BMI noted at 38            VTE Pharmacologic Prophylaxis:   Pharmacologic: Pharmacologic VTE Prophylaxis contraindicated due to bleeding decubitus ulcer  Mechanical VTE Prophylaxis in Place: Yes    Patient Centered Rounds: I have performed bedside rounds with nursing staff today  Discussions with Specialists or Other Care Team Provider: Discussed with surgery    Education and Discussions with Family / Patient: Discussed with son Chente Ely on the phone    Time Spent for Care: 20 minutes  More than 50% of total time spent on counseling and coordination of care as described above  Current Length of Stay: 4 day(s)    Current Patient Status: Inpatient   Certification Statement: The patient will continue to require additional inpatient hospital stay due to bleeding from decubitus ulcer    Code Status: Level 1 - Full Code    Subjective:   Large blood clot noted in the pan after she attempted to have a bowel movement but was only able to urinate  Does not feel clot was passed rectally  No pain over decubitus ulcer  No fever    Objective:     Vitals:   Temp (24hrs), Av 8 °F (37 1 °C), Min:98 8 °F (37 1 °C), Max:98 9 °F (37 2 °C)    HR:  [75-86] 75  Resp:  [18] 18  BP: (113-141)/(57-64) 138/57  SpO2:  [96 %-97 %] 96 %  Body mass index is 38 67 kg/m²  Physical Exam:     Physical Exam   Constitutional: She is oriented to person, place, and time  HENT:   Head: Atraumatic  Eyes: EOM are normal    Neck: Neck supple  No JVD present  No tracheal deviation present  No thyromegaly present  Cardiovascular: Normal rate, regular rhythm and normal heart sounds  Pulmonary/Chest: Effort normal and breath sounds normal  No respiratory distress  She has no wheezes   She has no rales  Abdominal: Soft  Bowel sounds are normal  She exhibits no distension  There is no tenderness  There is no rebound  Musculoskeletal: She exhibits no edema  Neurological: She is alert and oriented to person, place, and time  Skin:   Stage 2 left buttock decubitus ulcer   Psychiatric: She has a normal mood and affect  Additional Data:     Labs:      Results from last 7 days  Lab Units 07/24/18  1456 07/24/18  0627  07/20/18  1623   WBC Thousand/uL  --  8 58  < > 10 62*   HEMOGLOBIN g/dL 9 5* 9 1*  < > 10 6*   HEMATOCRIT % 30 2* 29 0*  < > 32 1*   PLATELETS Thousands/uL  --  388  < > 404*   NEUTROS PCT %  --   --   --  78*   LYMPHS PCT %  --   --   --  10*   MONOS PCT %  --   --   --  9   EOS PCT %  --   --   --  3   < > = values in this interval not displayed  Results from last 7 days  Lab Units 07/24/18  0627  07/20/18  1623   SODIUM mmol/L 140  < > 131*   POTASSIUM mmol/L 4 2  < > 4 6   CHLORIDE mmol/L 105  < > 94*   CO2 mmol/L 28  < > 30   BUN mg/dL 30*  < > 77*   CREATININE mg/dL 1 62*  < > 3 56*   CALCIUM mg/dL 9 0  < > 9 7   TOTAL PROTEIN g/dL  --   --  7 4   BILIRUBIN TOTAL mg/dL  --   --  0 40   ALK PHOS U/L  --   --  87   ALT U/L  --   --  15   AST U/L  --   --  13   GLUCOSE RANDOM mg/dL 192*  < > 287*   < > = values in this interval not displayed          Results from last 7 days  Lab Units 07/24/18  1557 07/24/18  1132 07/24/18  0714 07/23/18  2052 07/23/18  1613 07/23/18  1102 07/23/18  0724 07/22/18 2126 07/22/18  1612 07/22/18  1107 07/22/18  0733 07/21/18  2124   POC GLUCOSE mg/dl 236* 313* 177* 224* 284* 152* 99 138 161* 227* 161* 257*     Last 24 Hours Medication List:     Current Facility-Administered Medications:  acetaminophen 650 mg Oral Q6H PRN Enrique St MD   aspirin 81 mg Oral Daily Jamarcus Ferrer MD   bimatoprost 1 drop Both Eyes HS Enrique St MD   cholecalciferol 1,000 Units Oral Daily Enrique St MD   docusate sodium 100 mg Oral BID PRN Angelina Elias MYRA Ferrer MD   HYDROcodone-acetaminophen 1 tablet Oral BID PRN Talya Horton MD   insulin glargine 4 Units Subcutaneous HS Juliana Junior MD   insulin lispro 1-5 Units Subcutaneous TID Ormelany Ferrer MD   insulin lispro 1-5 Units Subcutaneous HS Talya Horton MD   levothyroxine 75 mcg Oral Early Morning Talya Horton MD   metoprolol tartrate 25 mg Oral Q12H SABRINA Talya Horton MD   nystatin  Topical BID Judeth Foots MD Carissa   ondansetron 4 mg Intravenous Q6H PRN Talya Horton MD   pravastatin 40 mg Oral Daily With Daphnie Holly MD   prednisoLONE acetate 1 drop Both Eyes BID Talya Horton MD   timolol 1 drop Right Eye Daily Talya Horton MD        Today, Patient Was Seen By: Juliana Junior MD    ** Please Note: Dictation voice to text software may have been used in the creation of this document   **

## 2018-07-24 NOTE — SOCIAL WORK
[facility] unable to accept patient  Second choice [facility] has accepted pt  Insurance auth for SNF submitted by myself  S/w pt, son, and daughter in law  All questions answered  CM dept will follow

## 2018-07-25 PROBLEM — N18.30 CKD (CHRONIC KIDNEY DISEASE) STAGE 3, GFR 30-59 ML/MIN (HCC): Chronic | Status: ACTIVE | Noted: 2018-07-25

## 2018-07-25 PROBLEM — N17.9 ACUTE KIDNEY INJURY (HCC): Status: RESOLVED | Noted: 2018-07-20 | Resolved: 2018-07-25

## 2018-07-25 LAB
ANION GAP SERPL CALCULATED.3IONS-SCNC: 6 MMOL/L (ref 4–13)
ANISOCYTOSIS BLD QL SMEAR: PRESENT
BACTERIA BLD CULT: NORMAL
BASOPHILS # BLD MANUAL: 0 THOUSAND/UL (ref 0–0.1)
BASOPHILS NFR MAR MANUAL: 0 % (ref 0–1)
BUN SERPL-MCNC: 25 MG/DL (ref 5–25)
CALCIUM SERPL-MCNC: 9.6 MG/DL (ref 8.3–10.1)
CHLORIDE SERPL-SCNC: 105 MMOL/L (ref 100–108)
CO2 SERPL-SCNC: 25 MMOL/L (ref 21–32)
CREAT SERPL-MCNC: 1.55 MG/DL (ref 0.6–1.3)
EOSINOPHIL # BLD MANUAL: 0.34 THOUSAND/UL (ref 0–0.4)
EOSINOPHIL NFR BLD MANUAL: 3 % (ref 0–6)
ERYTHROCYTE [DISTWIDTH] IN BLOOD BY AUTOMATED COUNT: 13.2 % (ref 11.6–15.1)
GFR SERPL CREATININE-BSD FRML MDRD: 31 ML/MIN/1.73SQ M
GLUCOSE SERPL-MCNC: 172 MG/DL (ref 65–140)
GLUCOSE SERPL-MCNC: 182 MG/DL (ref 65–140)
GLUCOSE SERPL-MCNC: 208 MG/DL (ref 65–140)
GLUCOSE SERPL-MCNC: 244 MG/DL (ref 65–140)
GLUCOSE SERPL-MCNC: 362 MG/DL (ref 65–140)
HCT VFR BLD AUTO: 29.9 % (ref 34.8–46.1)
HGB BLD-MCNC: 9.6 G/DL (ref 11.5–15.4)
LYMPHOCYTES # BLD AUTO: 1.72 THOUSAND/UL (ref 0.6–4.47)
LYMPHOCYTES # BLD AUTO: 15 % (ref 14–44)
MCH RBC QN AUTO: 29.7 PG (ref 26.8–34.3)
MCHC RBC AUTO-ENTMCNC: 32.1 G/DL (ref 31.4–37.4)
MCV RBC AUTO: 93 FL (ref 82–98)
MONOCYTES # BLD AUTO: 0.92 THOUSAND/UL (ref 0–1.22)
MONOCYTES NFR BLD: 8 % (ref 4–12)
NEUTROPHILS # BLD MANUAL: 8.49 THOUSAND/UL (ref 1.85–7.62)
NEUTS SEG NFR BLD AUTO: 74 % (ref 43–75)
PLATELET # BLD AUTO: 431 THOUSANDS/UL (ref 149–390)
PLATELET BLD QL SMEAR: ADEQUATE
PMV BLD AUTO: 9 FL (ref 8.9–12.7)
POLYCHROMASIA BLD QL SMEAR: PRESENT
POTASSIUM SERPL-SCNC: 4.4 MMOL/L (ref 3.5–5.3)
RBC # BLD AUTO: 3.23 MILLION/UL (ref 3.81–5.12)
SODIUM SERPL-SCNC: 136 MMOL/L (ref 136–145)
TOTAL CELLS COUNTED SPEC: 100
WBC # BLD AUTO: 11.47 THOUSAND/UL (ref 4.31–10.16)

## 2018-07-25 PROCEDURE — 97110 THERAPEUTIC EXERCISES: CPT

## 2018-07-25 PROCEDURE — 82948 REAGENT STRIP/BLOOD GLUCOSE: CPT

## 2018-07-25 PROCEDURE — 97535 SELF CARE MNGMENT TRAINING: CPT

## 2018-07-25 PROCEDURE — 99232 SBSQ HOSP IP/OBS MODERATE 35: CPT | Performed by: INTERNAL MEDICINE

## 2018-07-25 PROCEDURE — 80048 BASIC METABOLIC PNL TOTAL CA: CPT | Performed by: INTERNAL MEDICINE

## 2018-07-25 PROCEDURE — 87040 BLOOD CULTURE FOR BACTERIA: CPT | Performed by: INTERNAL MEDICINE

## 2018-07-25 PROCEDURE — 85027 COMPLETE CBC AUTOMATED: CPT | Performed by: INTERNAL MEDICINE

## 2018-07-25 PROCEDURE — 97530 THERAPEUTIC ACTIVITIES: CPT

## 2018-07-25 PROCEDURE — 85007 BL SMEAR W/DIFF WBC COUNT: CPT | Performed by: INTERNAL MEDICINE

## 2018-07-25 PROCEDURE — 99223 1ST HOSP IP/OBS HIGH 75: CPT | Performed by: INTERNAL MEDICINE

## 2018-07-25 RX ORDER — INSULIN GLARGINE 100 [IU]/ML
8 INJECTION, SOLUTION SUBCUTANEOUS
Status: DISCONTINUED | OUTPATIENT
Start: 2018-07-25 | End: 2018-07-26

## 2018-07-25 RX ADMIN — DOCUSATE SODIUM 100 MG: 100 CAPSULE, LIQUID FILLED ORAL at 18:09

## 2018-07-25 RX ADMIN — TIMOLOL MALEATE 1 DROP: 5 SOLUTION/ DROPS OPHTHALMIC at 08:20

## 2018-07-25 RX ADMIN — PRAVASTATIN SODIUM 40 MG: 40 TABLET ORAL at 18:08

## 2018-07-25 RX ADMIN — NYSTATIN: 100000 POWDER TOPICAL at 18:12

## 2018-07-25 RX ADMIN — BIMATOPROST 1 DROP: 0.1 SOLUTION/ DROPS OPHTHALMIC at 21:47

## 2018-07-25 RX ADMIN — METOPROLOL TARTRATE 25 MG: 25 TABLET ORAL at 08:19

## 2018-07-25 RX ADMIN — VITAMIN D, TAB 1000IU (100/BT) 1000 UNITS: 25 TAB at 08:19

## 2018-07-25 RX ADMIN — HYDROCODONE BITARTRATE AND ACETAMINOPHEN 1 TABLET: 5; 325 TABLET ORAL at 10:44

## 2018-07-25 RX ADMIN — INSULIN LISPRO 3 UNITS: 100 INJECTION, SOLUTION INTRAVENOUS; SUBCUTANEOUS at 18:09

## 2018-07-25 RX ADMIN — METOPROLOL TARTRATE 25 MG: 25 TABLET ORAL at 21:47

## 2018-07-25 RX ADMIN — INSULIN LISPRO 1 UNITS: 100 INJECTION, SOLUTION INTRAVENOUS; SUBCUTANEOUS at 21:47

## 2018-07-25 RX ADMIN — INSULIN GLARGINE 8 UNITS: 100 INJECTION, SOLUTION SUBCUTANEOUS at 21:47

## 2018-07-25 RX ADMIN — PREDNISOLONE ACETATE 1 DROP: 10 SUSPENSION/ DROPS OPHTHALMIC at 21:47

## 2018-07-25 RX ADMIN — ASPIRIN 81 MG: 81 TABLET, COATED ORAL at 08:19

## 2018-07-25 RX ADMIN — PREDNISOLONE ACETATE 1 DROP: 10 SUSPENSION/ DROPS OPHTHALMIC at 08:20

## 2018-07-25 RX ADMIN — NYSTATIN: 100000 POWDER TOPICAL at 08:20

## 2018-07-25 RX ADMIN — INSULIN LISPRO 1 UNITS: 100 INJECTION, SOLUTION INTRAVENOUS; SUBCUTANEOUS at 08:19

## 2018-07-25 RX ADMIN — LEVOTHYROXINE SODIUM 75 MCG: 75 TABLET ORAL at 05:25

## 2018-07-25 RX ADMIN — INSULIN LISPRO 3 UNITS: 100 INJECTION, SOLUTION INTRAVENOUS; SUBCUTANEOUS at 12:57

## 2018-07-25 RX ADMIN — INSULIN LISPRO 2 UNITS: 100 INJECTION, SOLUTION INTRAVENOUS; SUBCUTANEOUS at 18:09

## 2018-07-25 NOTE — CONSULTS
Consultation - Infectious Disease   Franky Santizo 81 y o  female MRN: [id number]  Unit/Bed#: -01 Encounter: [id number]    IMPRESSION & RECOMMENDATIONS:   1  Positive blood culture  Patient is showing staphylococcus aureus and anaerococcus species on 1 set of the original blood cultures  This may be a contaminant vs a true bacteremia  Patient has been afebrile with only mild leukocytosis  Repeat cultures were drawn today  I recommend continuing to monitor patient off of antibiotics while we await repeat culture results  -monitor patient off of antibiotics   -monitor CBC   -follow up blood cultures   -follow up repeat blood cultures   -monitor vitals    2  Stage 2 decubitus ulcer L buttock  POA  Patient's family reports it has been present for >1 year  Patient has been following closely with general surgery  Wound is still bleeding intermittently but is clean and not painful  Wound covered in exuderm     -local wound care   -frequent turning and repositioning to offload pressure from L buttock   -follow up general surgery    3  Acute kidney injury on chronic kidney disease stage 3  I reviewed the US of the kidneys and bladder from 7/20/18 which showed an atrophic left kidney with renal cortical thinning, echogenic renal cortices bilaterally consistent with medical renal disease, and no hydronephrosis  Reviewed patient's previous creatinine results from [facility], baseline is 1 4-1 6  Patient's creatinine upon admission was 3 56  This has greatly improved and her creatinine today is 1 55     -monitor BMP   -adjust antibiotics for renal function as needed    4  Type 2 diabetes mellitus  Patient's last HbA1c = 6 9 on 7/3/2018    -blood glucose management per primary team    HISTORY OF PRESENT ILLNESS:  Reason for Consult: positive blood culture  HPI: Franky Santizo is a 81y o  year old female who presented to [facility] ED on 7/20/18 for generalized weakness and a bleeding ulcer on her L buttock   The patient had had a wound on the L buttock for over a year and follows with an outpatient MD for wound care  She reports that the wound had been fine but is now bleeding  Upon arrival to the ED the patient's temperature was 98 3, HR = 80  Her WBC = 10 62  Her creatinine was 3 56 with GFR = 11  Blood cultures were sent  She was admitted for additional medical management  Since her admission that patient has had work up for acute kidney injury  A renal ultrasound was negative  A urinalysis was negative  Creatinine was improved and is 1 55 today  Patient has been receiving wound care for her L buttock wound which has continued to bleed occasionally  She has had no surrounding cellulitis  Patient's original blood cultures are showing staph aureus and anaerococcus in one of the sets, other set remains negative after 4 days  Repeat cultures are now pending  We've been asked for formal consult given patient's positive blood culture  Patient has a past medical and surgical history significant for DMII, HTN, hyperlipidemia, lower extremity edema, glaucoma, and eye surgery  She has no known drug allergies  REVIEW OF SYSTEMS:  Patient reports that she feels alright today  She's noticed lately she's very weak at home and feels this is still the case here in the hospital  Her appetite has been poor  She is going to go to [facility] after she's discharged and hopes that she can work on getting stronger so that she can keep living in her apartment  She reports that the wound on her left buttock has been bleeding on and off  If does not hurt her unless she sits on it for a long period of time  She states that her legs were very swollen when she arrived at the hospital, she feels this is better now  A complete 12 point system-based review of systems is otherwise negative  PAST MEDICAL HISTORY:  Past Medical History:   Diagnosis Date    Diabetes mellitus ([facility] [address] )     Hyperlipidemia     Hypertension      History reviewed  No pertinent surgical history  FAMILY HISTORY:  Non-contributory    SOCIAL HISTORY:  Social History   History   Alcohol Use No     History   Drug Use No     History   Smoking Status    Never Smoker   Smokeless Tobacco    Never Used     ALLERGIES:  No Known Allergies    MEDICATIONS:  All current active medications have been reviewed  ANTIBIOTICS:  No current antibiotic use    PHYSICAL EXAM:  HR:  [75-91] 83  Resp:  [18] 18  BP: (138-172)/(57-77) 148/67  SpO2:  [92 %-96 %] 96 %  Temp (24hrs), Av 7 °F (37 1 °C), Min:98 2 °F (36 8 °C), Max:99 °F (37 2 °C)  Current: Temperature: 98 2 °F (36 8 °C)    Intake/Output Summary (Last 24 hours) at 18 1458  Last data filed at 18 1706   Gross per 24 hour   Intake                0 ml   Output              150 ml   Net             -150 ml     General Appearance:  Appearing well, nontoxic, and in no distress   Head:  Normocephalic, without obvious abnormality, atraumatic   Eyes:  Conjunctiva pink and sclera anicteric, both eyes   Nose: Nares normal, mucosa normal, no drainage   Throat: Oropharynx moist without lesions   Neck: Supple, symmetrical, no adenopathy, no tenderness/mass/nodules   Back:   Symmetric, no CVA tenderness   Lungs:   Clear to auscultation bilaterally, respirations unlabored, occasional dry cough during exam   Chest Wall:  No tenderness or deformity   Heart:  RRR; no murmur, rub or gallop   Abdomen:   Soft, obese, non-tender, non-distended, positive bowel sounds    Extremities: No cyanosis or clubbing, +1 pitting edema B/L lower shins and dorsal feet   Skin: No rashes or lesions  Exuderm on L medial buttock  Lymph nodes: Cervical, supraclavicular nodes normal   Neurologic: Alert and oriented times 3     LABS, IMAGING, & OTHER STUDIES:  Lab Results:  I have personally reviewed pertinent labs      Results from last 7 days  Lab Units 18  0431 18  1456 18  0627 18  0526   WBC Thousand/uL 11 47*  --  8 58 8 26 HEMOGLOBIN g/dL 9 6* 9 5* 9 1* 8 6*   PLATELETS Thousands/uL 431*  --  388 386       Results from last 7 days  Lab Units 07/25/18  0431 07/24/18  0627 07/23/18  0442  07/20/18  1623   SODIUM mmol/L 136 140 137  < > 131*   POTASSIUM mmol/L 4 4 4 2 4 0  < > 4 6   CHLORIDE mmol/L 105 105 105  < > 94*   CO2 mmol/L 25 28 27  < > 30   ANION GAP mmol/L 6 7 5  < > 7   BUN mg/dL 25 30* 34*  < > 77*   CREATININE mg/dL 1 55* 1 62* 1 77*  < > 3 56*   EGFR ml/min/1 73sq m 31 29 26  < > 11   GLUCOSE RANDOM mg/dL 208* 192* 112  < > 287*   CALCIUM mg/dL 9 6 9 0 9 0  < > 9 7   AST U/L  --   --   --   --  13   ALT U/L  --   --   --   --  15   ALK PHOS U/L  --   --   --   --  87   TOTAL PROTEIN g/dL  --   --   --   --  7 4   BILIRUBIN TOTAL mg/dL  --   --   --   --  0 40   < > = values in this interval not displayed  Results from last 7 days  Lab Units 07/20/18  1751 07/20/18  1626 07/20/18  1623   BLOOD CULTURE   --  No Growth After 4 Days  Staphylococcus aureus*  Anaerococcus species*   GRAM STAIN RESULT   --   --  Gram positive cocci in clusters   URINE CULTURE  >100,000 cfu/ml   --   --      Imaging Studies:   I have personally reviewed pertinent imaging study reports and images in PACS  US KIDNEY AND BLADDER 7/20/18  FINDINGS:  KIDNEYS:  Renal cortices are echogenic consistent with medical renal disease   Left kidney is somewhat atrophic   No sonographically detectable solid renal mass   No hydronephrosis   No echogenic shadowing urinary tract calculi  Right kidney:  10 cm  Left kidney:  8 0 cm  URETERS:  Nonvisualized  BLADDER:   Normally distended  No focal thickening or mass lesions  Impression:     Atrophic left kidney with renal cortical thinning   Echogenic renal cortices bilaterally consistent with medical renal disease   No hydronephrosis       Other Studies:   Repeat blood cultures are pending

## 2018-07-25 NOTE — ASSESSMENT & PLAN NOTE
· POA, presented from home with bleeding     · Duoderm q72 hrs, frequent repositioning, pillows for offloading recommended by surgery  · Large clot noted in the pan yesterday in the bathroom - likely from ulcer - no recurrence of bleeding since then  · H/H stable at present  · Serial exams

## 2018-07-25 NOTE — PHYSICAL THERAPY NOTE
PHYSICAL THERAPY NOTE    Patient Name: Oliver Chino  AZTTH'C Date: 7/25/2018 07/25/18 1048   Pain Assessment   Pain Assessment 0-10   Pain Score 7   Pain Type Acute pain   Pain Location Knee   Pain Orientation Bilateral  (R>L)   Restrictions/Precautions   Other Precautions Bed Alarm; Chair Alarm; Fall Risk;Pain   General   Family/Caregiver Present No   Subjective   Subjective Patinet supine in bed and is agreeable to therapy session  Bed Mobility   Supine to Sit 3  Moderate assistance   Additional items Assist x 1;HOB elevated; Bedrails; Increased time required;LE management;Verbal cues   Sit to Supine 2  Maximal assistance   Additional items Assist x 2; Increased time required;Verbal cues;LE management   Additional Comments Patient supine in bed with bed alarm engaged, call bell and belongings in reach  Transfers   Sit to Stand 2  Maximal assistance   Additional items Assist x 2; Increased time required;Verbal cues   Stand to Sit 2  Maximal assistance   Additional items Assist x 2; Increased time required;Verbal cues   Stand pivot Unable to assess   Ambulation/Elevation   Gait pattern Not appropriate   Balance   Static Sitting Good   Static Standing Poor   Activity Tolerance   Activity Tolerance Patient limited by pain; Patient limited by fatigue   Medical Staff Via Mike Birmingham, OT   Nurse Made Aware Spoke to Pako Ruiz RN    Exercises   Quad Sets Supine;5 reps;AROM; Bilateral   Heelslides Supine;5 reps;AAROM; Bilateral   Knee AROM Short Arc Quad Supine;5 reps;AAROM; Bilateral   Assessment   Prognosis Guarded   Problem List Decreased strength;Decreased range of motion;Decreased endurance; Impaired balance;Decreased mobility; Decreased coordination;Decreased cognition;Obesity;Pain;Decreased skin integrity  (gait deviations)   Assessment Patient seen co-treat with EDUARDO Cespedes secondary to degree of assistance required for mobility tasks  Patient with increase B knee pain however is agreeable to therapy session  Patient demonstrated ability to transfer supine to sit with less person assist and verbal instructions for body positioning  Pt continues to requires max x2 fro sit to stand transfers with difficulty bringing hips forward and head up to stand erect  Standign tolerance limited to 5-6 seconds  Pt participated in supine B knee exercises with AROM/AAROM with input from therapist for form and repetitions  Continue to focus on OOB mobility with progression of transfers and ambulation as appropriate  Barriers to Discharge Decreased caregiver support; Inaccessible home environment   Goals   Patient Goals to have less pain   STG Expiration Date 08/06/18   Treatment Day 1   Plan   Treatment/Interventions Functional transfer training;LE strengthening/ROM; Therapeutic exercise; Endurance training;Cognitive reorientation;Patient/family training;Bed mobility;Gait training;Equipment eval/education;OT;Spoke to case management;Spoke to nursing   PT Frequency Other (Comment)  (3-5x/week)       Aniyah Hernandez, PTA

## 2018-07-25 NOTE — PLAN OF CARE
Problem: DISCHARGE PLANNING - CARE MANAGEMENT  Goal: Discharge to post-acute care or home with appropriate resources  INTERVENTIONS:  - Conduct assessment to determine patient/family and health care team treatment goals, and need for post-acute services based on payer coverage, community resources, and patient preferences, and barriers to discharge  - Address psychosocial, clinical, and financial barriers to discharge as identified in assessment in conjunction with the patient/family and health care team  - Arrange appropriate level of post-acute services according to patients   needs and preference and payer coverage in collaboration with the physician and health care team  - Communicate with and update the patient/family, physician, and health care team regarding progress on the discharge plan  - Arrange appropriate transportation to post-acute venues   Outcome: [address] for SNF transfer received from Gudelia at [facility]  Auth# [id number]  SNF Level 1  Aprroved start date for tomorrow, 7/26, for initial 7 days with last covered and and next update on 8/1 to Brock at [phone]  Above info given to [address] from [facility] in person

## 2018-07-25 NOTE — PLAN OF CARE
Problem: OCCUPATIONAL THERAPY ADULT  Goal: Performs self-care activities at highest level of function for planned discharge setting  See evaluation for individualized goals  Treatment Interventions: ADL retraining, Functional transfer training, UE strengthening/ROM, Patient/family training, Equipment evaluation/education, Compensatory technique education, Continued evaluation, Activityengagement  Equipment Recommended: Other (comment) (has DME and grab bars)       See flowsheet documentation for full assessment, interventions and recommendations  Outcome: Progressing  Limitation: Decreased ADL status, Decreased UE strength, Decreased endurance, Decreased self-care trans, Decreased high-level ADLs     Assessment: Pt seen for split OT session this AM from 5417-3803 and [id number] (co-tx w/ PTA, Sabine Escobedo for second)  Pt benefits from co -tx w/ PT due to level of physical assistance and cues needed for functional transfers / mobility  Pt required less physical assistasnce for bed mobility supine to sit at EOB, but continues to require assist of2 to stand from EOB w/ bed height elevated  Pt demonstrate increased activity tolerance and sitting tolerance at EOB this AM to completed grooming while engaged in conversations  Continue to recommend post acute rehab when medically stable for discharge from acute care to return to baseline level of I   Will continue to follow     OT Discharge Recommendation: Other (Comment) (to post acute rehab)  OT - OK to Discharge:  (to post acute rehab)

## 2018-07-25 NOTE — PLAN OF CARE
DISCHARGE PLANNING - CARE MANAGEMENT     Discharge to post-acute care or home with appropriate resources Progressing        METABOLIC, FLUID AND ELECTROLYTES - ADULT     Fluid balance maintained Progressing     Glucose maintained within target range Progressing        Nutrition/Hydration-ADULT     Nutrient/Hydration intake appropriate for improving, restoring or maintaining nutritional needs Progressing        PAIN - ADULT     Verbalizes/displays adequate comfort level or baseline comfort level Progressing        Potential for Falls     Patient will remain free of falls Progressing        Prexisting or High Potential for Compromised Skin Integrity     Skin integrity is maintained or improved Progressing        SKIN/TISSUE INTEGRITY - ADULT     Skin integrity remains intact Progressing

## 2018-07-25 NOTE — PROGRESS NOTES
Progress Note - Marquis Abernathy 1935, 81 y o  female MRN: [id number]    Unit/Bed#: -01 Encounter: [id number]    Primary Care Provider: Maykel Lan MD   Date and time admitted to hospital: 7/20/2018  2:39 PM        Decubitus ulcer of buttock, stage 2   Assessment & Plan    · POA, presented from home with bleeding  · Duoderm q72 hrs, frequent repositioning, pillows for offloading recommended by surgery  · Large clot noted in the pan yesterday in the bathroom - likely from ulcer - no recurrence of bleeding since then  · H/H stable at present  · Serial exams         Positive blood culture   Assessment & Plan    · 1/2 blood cultures from admission positive for staph aureus and anaerococcus  · Mild increase in WBC today  · No fever  · Repeat blood cultures drawn this morning  · ID consult - discussed with Dr Jocelyne Nguyen        CKD (chronic kidney disease) stage 3, GFR 30-59 ml/min   Assessment & Plan    Presented with BERTHA which has resolved  Baseline creatinine 1 4 to 1 6 which peaked at 3 56  Continue to hold ACE/HCTZ and Lasix  Was on lasix for chronic venous insufficiency         Ambulatory dysfunction   Assessment & Plan    · PT, OT recommend inpatient rehab - has bed at [facility]        Type 2 diabetes mellitus with diabetic polyneuropathy, without long-term current use of insulin [facility])   Assessment & Plan      Recent Labs      07/24/18   1557  07/24/18   2046  07/25/18   0718  07/25/18   1135   POCGLU  236*  203*  172*  362*     Blood Sugar Average: Last 72 hrs:  · Hemoglobin A1c 6 9 in July this year  · Hold Tradjenta  · SSI, accucheks   (P) 806 [id number]     Hyperglycemia persisting    Plan: Increase dose of Lantus, continue SSI, add Humalog 3 TID        Essential hypertension   Assessment & Plan    · Continue beta-blocker  · ACE/HCTZ and Lasix held due to recent BERTHA  · BP acceptable        Anemia   Assessment & Plan    · Baseline hgb 11-12, stable in mid 9's at present  · Likely due to bleeding from decubitus ulcer   · F/u hemetest        Candidal intertrigo   Assessment & Plan    · Nystatin powder  · Wound care c/s         Morbid obesity ([address] )   Assessment & Plan    · Recommend diet and lifestyle modifications   · BMI noted at 38            VTE Pharmacologic Prophylaxis:   Pharmacologic: Pharmacologic VTE Prophylaxis contraindicated due to bleeding decubitus ulcer  Mechanical VTE Prophylaxis in Place: Yes    Patient Centered Rounds: I have performed bedside rounds with nursing staff today  Discussions with Specialists or Other Care Team Provider: Discussed with Dr Barbara Maldonado    Education and Discussions with Family / Patient: Discussed with son Valencia Murrieta on the phone    Time Spent for Care: 20 minutes  More than 50% of total time spent on counseling and coordination of care as described above  Current Length of Stay: 5 day(s)    Current Patient Status: Inpatient   Certification Statement: The patient will continue to require additional inpatient hospital stay due to positive blood culture    Code Status: Level 1 - Full Code    Subjective:   No further bleeding from decubitus  No fever  Feels well  Objective:     Vitals:   Temp (24hrs), Av 4 °F (36 9 °C), Min:98 1 °F (36 7 °C), Max:99 °F (37 2 °C)    HR:  [72-91] 72  Resp:  [18] 18  BP: (140-172)/(63-77) 151/68  SpO2:  [92 %-96 %] 96 %  Body mass index is 38 67 kg/m²  Physical Exam:     Physical Exam   Constitutional: She is oriented to person, place, and time  HENT:   Head: Atraumatic  Eyes: EOM are normal    Neck: Neck supple  No JVD present  No tracheal deviation present  No thyromegaly present  Cardiovascular: Normal rate, regular rhythm and normal heart sounds  Pulmonary/Chest: Effort normal and breath sounds normal  No respiratory distress  She has no wheezes  She has no rales  Abdominal: Soft  Bowel sounds are normal  She exhibits no distension  There is no tenderness  There is no rebound     Neurological: She is alert and oriented to person, place, and time  Skin:   Left gluteal decubitus   Psychiatric: She has a normal mood and affect  Additional Data:     Labs:      Results from last 7 days  Lab Units 07/25/18  0431  07/20/18  1623   WBC Thousand/uL 11 47*  < > 10 62*   HEMOGLOBIN g/dL 9 6*  < > 10 6*   HEMATOCRIT % 29 9*  < > 32 1*   PLATELETS Thousands/uL 431*  < > 404*   NEUTROS PCT %  --   --  78*   LYMPHS PCT %  --   --  10*   LYMPHO PCT % 15  --   --    MONOS PCT %  --   --  9   MONO PCT MAN % 8  --   --    EOS PCT %  --   --  3   EOSINO PCT MANUAL % 3  --   --    < > = values in this interval not displayed  Results from last 7 days  Lab Units 07/25/18  0431  07/20/18  1623   SODIUM mmol/L 136  < > 131*   POTASSIUM mmol/L 4 4  < > 4 6   CHLORIDE mmol/L 105  < > 94*   CO2 mmol/L 25  < > 30   BUN mg/dL 25  < > 77*   CREATININE mg/dL 1 55*  < > 3 56*   CALCIUM mg/dL 9 6  < > 9 7   TOTAL PROTEIN g/dL  --   --  7 4   BILIRUBIN TOTAL mg/dL  --   --  0 40   ALK PHOS U/L  --   --  87   ALT U/L  --   --  15   AST U/L  --   --  13   GLUCOSE RANDOM mg/dL 208*  < > 287*   < > = values in this interval not displayed  Results from last 7 days  Lab Units 07/25/18  1135 07/25/18  0718 07/24/18  2046 07/24/18  1557 07/24/18  1132 07/24/18  0714 07/23/18  2052 07/23/18  1613 07/23/18  1102 07/23/18  0724 07/22/18  2126 07/22/18  1612   POC GLUCOSE mg/dl 362* 172* 203* 236* 313* 177* 224* 284* 152* 99 138 161*             * I Have Reviewed All Lab Data Listed Above  * Additional Pertinent Lab Tests Reviewed: Dominique 66 Admission Reviewed      Recent Cultures (last 7 days):       Results from last 7 days  Lab Units 07/20/18  1751 07/20/18  1626 07/20/18  1623   BLOOD CULTURE   --  No Growth After 4 Days   Staphylococcus aureus*  Anaerococcus species*   GRAM STAIN RESULT   --   --  Gram positive cocci in clusters   URINE CULTURE  >100,000 cfu/ml   --   --        Last 24 Hours Medication List:     Current Facility-Administered Medications:  acetaminophen 650 mg Oral Q6H PRN Cydney Duron MD   aspirin 81 mg Oral Daily Jamarcus Ferrer MD   bimatoprost 1 drop Both Eyes HS Yvonne Ferrer MD   calcium carbonate 500 mg Oral Daily PRN Em Ross PA-C   cholecalciferol 1,000 Units Oral Daily Jamarcus Ferrer MD   docusate sodium 100 mg Oral BID PRN Cydney Duorn MD   HYDROcodone-acetaminophen 1 tablet Oral BID PRN Cydney Duron MD   insulin glargine 8 Units Subcutaneous HS Harlan Hughes MD   insulin lispro 1-5 Units Subcutaneous TID Tala Ferrer MD   insulin lispro 1-5 Units Subcutaneous HS Yvonne Ferrer MD   insulin lispro 3 Units Subcutaneous TID With Meals Harlan Hughes MD   levothyroxine 75 mcg Oral Early Morning Cydney Duron MD   metoprolol tartrate 25 mg Oral Q12H SABRINA Cydney Duron MD   nystatin  Topical BID Yvonne Ferrer MD   ondansetron 4 mg Intravenous Q6H PRN Cydney Duron MD   pravastatin 40 mg Oral Daily With Marquise Li MD   prednisoLONE acetate 1 drop Both Eyes BID Cydney Duron MD   timolol 1 drop Right Eye Daily Cydney Duron MD        Today, Patient Was Seen By: Harlan Hughes MD    ** Please Note: Dictation voice to text software may have been used in the creation of this document   **

## 2018-07-25 NOTE — OCCUPATIONAL THERAPY NOTE
OccupationalTherapy Progress Note     Patient Name: Moraima Post  Today's Date: 7/25/2018  Problem List  Patient Active Problem List   Diagnosis    Type 2 diabetes mellitus with diabetic polyneuropathy, without long-term current use of insulin [facility] [address]    Decubitus ulcer of buttock, stage 2    Acute kidney injury ([facility] [address] )    Essential hypertension    Morbid obesity ([facility] [address] )    Candidal intertrigo    Ambulatory dysfunction    Anemia    Positive blood culture           07/25/18 1107   Restrictions/Precautions   Weight Bearing Precautions Per Order No   Other Precautions Bed Alarm;Pain; Fall Risk   General   Response to Previous Treatment Patient with no complaints from previous session   Lifestyle   Intrinsic Gratification Pt reports enjoying compainionship w/ her son's black lab   Pain Assessment   Pain Assessment 0-10   Pain Score 6   Pain Type Chronic pain;Acute pain   Pain Location Knee   Pain Orientation Right;Left  (L >R)   Effect of Pain on Daily Activities limits standing tolerance and sitting tolerance during ADL performance   Patient's Stated Pain Goal No pain   Hospital Pain Intervention(s) Repositioned; Ambulation/increased activity; Emotional support  (notified RN, Brynn Islas)   Response to Interventions returned to bed   ADL   Eating Assistance 6  Modified independent   Eating Deficit Setup   Eating Comments supine HOB elevated; pt reports had breakfast this AM   Grooming Assistance 5  Supervision/Setup   Grooming Deficit Setup;Supervision/safety; Increased time to complete   Grooming Comments seated unsupported at EOB to comb hair; required three rest breaks and increased time to complete   UB Bathing Assistance Unable to assess   UB Bathing Comments pt reports just finished getting washed up w/ PCA, [facility]  LB Bathing Assistance Unable to assess   UB Dressing Assistance 4  Minimal Assistance   UB Dressing Deficit Pull around back; Setup; Increased time to complete   LB Dressing Assistance 1  Total Assistance   Bed Mobility   Supine to Sit 2  Maximal assistance   Additional items Assist x 1;LE management;Verbal cues; Increased time required; Bedrails   Sit to Supine 2  Maximal assistance   Additional items Assist x 2;LE management;Verbal cues; Increased time required; Bedrails   Additional Comments Pt completed bed mobility supine <> sit twice during session  Unable to complete SPT to bedside recliner or w/c; returned to bed at end of session w/ call bell in reach, bed alarm activated and needs met   Transfers   Sit to Stand 2  Maximal assistance   Additional items Assist x 2; Increased time required;Verbal cues   Stand to Sit 2  Maximal assistance   Additional items Assist x 2; Increased time required;Verbal cues   Stand pivot Unable to assess  (pt unable to complete)   Additional Comments Pt completed sit <> stand from EOB w/ bed height elevated four times this AM w/ max A x2  Pt unable to maintain standing or shift weight  Extensive cues and asisst to stand upright w/ hips extended   Cognition   Overall Cognitive Status Impaired   Arousal/Participation Cooperative; Alert   Attention Attends with cues to redirect   Orientation Level Oriented X4   Memory Decreased recall of recent events  (unable to recall if nursing used emmanuel lift to return to bed)   Following Commands Follows one step commands with increased time or repetition   Comments Identified pt by full name and birthdate  Pt presents w/ flat affect and benefits from encoruagement to challenge activity tolerance, activity engagement   Activity Tolerance   Activity Tolerance Patient limited by fatigue;Patient limited by pain   Medical Staff Made Aware spoke to RN, Jacoby Zurita; PCA, Vazquez Laboy, and PTAEdith   Assessment   Assessment Pt seen for split OT session this AM from 6160-1438 and 1628-1463 (co-tx w/ PTA, Lynda Starks for second)  Pt benefits from co -tx w/ PT due to level of physical assistance and cues needed for functional transfers / mobility   Pt required less physical assistasnce for bed mobility supine to sit at EOB, but continues to require assist of2 to stand from EOB w/ bed height elevated  Pt demonstrate increased activity tolerance and sitting tolerance at EOB this AM to completed grooming while engaged in conversations  Continue to recommend post acute rehab when medically stable for discharge from acute care to return to baseline level of I  Will continue to follow   Plan   Treatment Interventions ADL retraining;Functional transfer training;UE strengthening/ROM; Endurance training;Patient/family training;Equipment evaluation/education;Continued evaluation; Activityengagement   Goal Expiration Date 07/30/18   Treatment Day 2   OT Frequency 3-5x/wk   Recommendation   OT Discharge Recommendation Other (Comment)  (to post acute rehab)   Equipment Recommended Bedside commode;Tub seat with back   OT - OK to Discharge (to post acute rehab)   Barthel Index   Feeding 10   Bathing 0   Grooming Score 5   Dressing Score 0   Bladder Score 10   Bowels Score 10   Toilet Use Score 0   Transfers (Bed/Chair) Score 5   Mobility (Level Surface) Score 0   Stairs Score 0   Barthel Index Score 40   Modified York Scale   Modified Colin Scale 4   Rubina Bradshaw, OTR/L

## 2018-07-25 NOTE — ASSESSMENT & PLAN NOTE
· 1/2 blood cultures from admission positive for staph aureus and anaerococcus  · Mild increase in WBC today  · No fever  · Repeat blood cultures drawn this morning  · ID consult - discussed with Dr Flakito Shore

## 2018-07-25 NOTE — ASSESSMENT & PLAN NOTE
Recent Labs      07/24/18   1557  07/24/18   2046  07/25/18   0718  07/25/18   1135   POCGLU  236*  203*  172*  362*     Blood Sugar Average: Last 72 hrs:  · Hemoglobin A1c 6 9 in July this year  · Hold Tradjenta  · SSI, accucheks   (P) 736 [phone]     Hyperglycemia persisting    Plan: Increase dose of Lantus, continue SSI, add Humalog 3 TID

## 2018-07-25 NOTE — PLAN OF CARE
Problem: PHYSICAL THERAPY ADULT  Goal: Performs mobility at highest level of function for planned discharge setting  See evaluation for individualized goals  Treatment/Interventions: Functional transfer training, LE strengthening/ROM, Therapeutic exercise, Endurance training, Cognitive reorientation, Patient/family training, Bed mobility, Gait training, Equipment eval/education, OT, Spoke to case management, Spoke to nursing  Equipment Recommended: Mary Flash, Wheelchair       See flowsheet documentation for full assessment, interventions and recommendations  Outcome: Progressing  Prognosis: Guarded  Problem List: Decreased strength, Decreased range of motion, Decreased endurance, Impaired balance, Decreased mobility, Decreased coordination, Decreased cognition, Obesity, Pain, Decreased skin integrity (gait deviations)  Assessment: Patient seen co-treat with EDUARDO Cespedes secondary to degree of assistance required for mobility tasks  Patient with increase B knee pain however is agreeable to therapy session  Patient demonstrated ability to transfer supine to sit with less person assist and verbal instructions for body positioning  Pt continues to requires max x2 fro sit to stand transfers with difficulty bringing hips forward and head up to stand erect  Standign tolerance limited to 5-6 seconds  Pt participated in supine B knee exercises with AROM/AAROM with input from therapist for form and repetitions  Continue to focus on OOB mobility with progression of transfers and ambulation as appropriate  Barriers to Discharge: Decreased caregiver support, Inaccessible home environment     Recommendation: Post acute IP rehab, Long-term skilled PT          See flowsheet documentation for full assessment

## 2018-07-25 NOTE — SOCIAL WORK
Insurance auth for SNF transfer received from [place]ud[place] at [employer]  Auth# [id number]  SNF Level 1  Aprroved start date for tomorrow, 7/26, for initial 7 days with last covered and and next update on 8/1 to Brock at [phone]  Above info given to [address] from [facility] in person

## 2018-07-25 NOTE — ASSESSMENT & PLAN NOTE
Presented with BERTHA which has resolved  Baseline creatinine 1 4 to 1 6 which peaked at 3 56  Continue to hold ACE/HCTZ and Lasix  Was on lasix for chronic venous insufficiency

## 2018-07-26 LAB
BASOPHILS # BLD AUTO: 0.02 THOUSANDS/ΜL (ref 0–0.1)
BASOPHILS NFR BLD AUTO: 0 % (ref 0–1)
EOSINOPHIL # BLD AUTO: 0.34 THOUSAND/ΜL (ref 0–0.61)
EOSINOPHIL NFR BLD AUTO: 3 % (ref 0–6)
ERYTHROCYTE [DISTWIDTH] IN BLOOD BY AUTOMATED COUNT: 13.2 % (ref 11.6–15.1)
GLUCOSE SERPL-MCNC: 210 MG/DL (ref 65–140)
GLUCOSE SERPL-MCNC: 214 MG/DL (ref 65–140)
GLUCOSE SERPL-MCNC: 215 MG/DL (ref 65–140)
GLUCOSE SERPL-MCNC: 273 MG/DL (ref 65–140)
HCT VFR BLD AUTO: 27.9 % (ref 34.8–46.1)
HGB BLD-MCNC: 9.2 G/DL (ref 11.5–15.4)
LYMPHOCYTES # BLD AUTO: 0.37 THOUSANDS/ΜL (ref 0.6–4.47)
LYMPHOCYTES NFR BLD AUTO: 3 % (ref 14–44)
MCH RBC QN AUTO: 30.1 PG (ref 26.8–34.3)
MCHC RBC AUTO-ENTMCNC: 33 G/DL (ref 31.4–37.4)
MCV RBC AUTO: 91 FL (ref 82–98)
MONOCYTES # BLD AUTO: 0.54 THOUSAND/ΜL (ref 0.17–1.22)
MONOCYTES NFR BLD AUTO: 5 % (ref 4–12)
NEUTROPHILS # BLD AUTO: 10.37 THOUSANDS/ΜL (ref 1.85–7.62)
NEUTS SEG NFR BLD AUTO: 89 % (ref 43–75)
PLATELET # BLD AUTO: 303 THOUSANDS/UL (ref 149–390)
PMV BLD AUTO: 8.5 FL (ref 8.9–12.7)
RBC # BLD AUTO: 3.06 MILLION/UL (ref 3.81–5.12)
WBC # BLD AUTO: 11.64 THOUSAND/UL (ref 4.31–10.16)

## 2018-07-26 PROCEDURE — 85025 COMPLETE CBC W/AUTO DIFF WBC: CPT | Performed by: INTERNAL MEDICINE

## 2018-07-26 PROCEDURE — 99232 SBSQ HOSP IP/OBS MODERATE 35: CPT | Performed by: INTERNAL MEDICINE

## 2018-07-26 PROCEDURE — 97110 THERAPEUTIC EXERCISES: CPT

## 2018-07-26 PROCEDURE — 82948 REAGENT STRIP/BLOOD GLUCOSE: CPT

## 2018-07-26 RX ORDER — INSULIN GLARGINE 100 [IU]/ML
12 INJECTION, SOLUTION SUBCUTANEOUS
Status: DISCONTINUED | OUTPATIENT
Start: 2018-07-26 | End: 2018-07-27 | Stop reason: HOSPADM

## 2018-07-26 RX ORDER — POLYETHYLENE GLYCOL 3350 17 G/17G
17 POWDER, FOR SOLUTION ORAL DAILY
Status: DISCONTINUED | OUTPATIENT
Start: 2018-07-26 | End: 2018-07-27 | Stop reason: HOSPADM

## 2018-07-26 RX ADMIN — INSULIN LISPRO 3 UNITS: 100 INJECTION, SOLUTION INTRAVENOUS; SUBCUTANEOUS at 08:55

## 2018-07-26 RX ADMIN — NYSTATIN: 100000 POWDER TOPICAL at 09:21

## 2018-07-26 RX ADMIN — INSULIN LISPRO 1 UNITS: 100 INJECTION, SOLUTION INTRAVENOUS; SUBCUTANEOUS at 21:26

## 2018-07-26 RX ADMIN — INSULIN LISPRO 1 UNITS: 100 INJECTION, SOLUTION INTRAVENOUS; SUBCUTANEOUS at 08:56

## 2018-07-26 RX ADMIN — TIMOLOL MALEATE 1 DROP: 5 SOLUTION/ DROPS OPHTHALMIC at 08:51

## 2018-07-26 RX ADMIN — ASPIRIN 81 MG: 81 TABLET, COATED ORAL at 08:51

## 2018-07-26 RX ADMIN — INSULIN GLARGINE 12 UNITS: 100 INJECTION, SOLUTION SUBCUTANEOUS at 21:25

## 2018-07-26 RX ADMIN — LEVOTHYROXINE SODIUM 75 MCG: 75 TABLET ORAL at 05:17

## 2018-07-26 RX ADMIN — NYSTATIN: 100000 POWDER TOPICAL at 18:00

## 2018-07-26 RX ADMIN — INSULIN LISPRO 1 UNITS: 100 INJECTION, SOLUTION INTRAVENOUS; SUBCUTANEOUS at 16:14

## 2018-07-26 RX ADMIN — METOPROLOL TARTRATE 25 MG: 25 TABLET ORAL at 08:51

## 2018-07-26 RX ADMIN — POLYETHYLENE GLYCOL 3350 17 G: 17 POWDER, FOR SOLUTION ORAL at 19:19

## 2018-07-26 RX ADMIN — INSULIN LISPRO 2 UNITS: 100 INJECTION, SOLUTION INTRAVENOUS; SUBCUTANEOUS at 13:05

## 2018-07-26 RX ADMIN — VITAMIN D, TAB 1000IU (100/BT) 1000 UNITS: 25 TAB at 08:51

## 2018-07-26 RX ADMIN — PRAVASTATIN SODIUM 40 MG: 40 TABLET ORAL at 16:13

## 2018-07-26 RX ADMIN — METOPROLOL TARTRATE 25 MG: 25 TABLET ORAL at 21:26

## 2018-07-26 RX ADMIN — BIMATOPROST 1 DROP: 0.1 SOLUTION/ DROPS OPHTHALMIC at 21:28

## 2018-07-26 RX ADMIN — PREDNISOLONE ACETATE 1 DROP: 10 SUSPENSION/ DROPS OPHTHALMIC at 21:28

## 2018-07-26 RX ADMIN — DOCUSATE SODIUM 100 MG: 100 CAPSULE, LIQUID FILLED ORAL at 10:53

## 2018-07-26 RX ADMIN — PREDNISOLONE ACETATE 1 DROP: 10 SUSPENSION/ DROPS OPHTHALMIC at 08:51

## 2018-07-26 RX ADMIN — HYDROCODONE BITARTRATE AND ACETAMINOPHEN 1 TABLET: 5; 325 TABLET ORAL at 08:58

## 2018-07-26 NOTE — ASSESSMENT & PLAN NOTE
· 1/2 blood cultures from admission positive for staph aureus and anaerococcus  · Mild leucocytosis  · Low grade temperature  · Repeat blood cultures - negative x 24 hours  · Follow-up final blood cultures

## 2018-07-26 NOTE — ASSESSMENT & PLAN NOTE
· PT, OT recommend inpatient rehab - has bed at [facility] - to be discharged in am of 7/26

## 2018-07-26 NOTE — ASSESSMENT & PLAN NOTE
Recent Labs      07/25/18   2120  07/26/18   0717  07/26/18   1127  07/26/18   1557   POCGLU  182*  214*  273*  215*     Blood Sugar Average: Last 72 hrs:  · Hemoglobin A1c 6 9 in July this year  (P) 220 [id number]     Hyperglycemia improved but not at goal    Plan: Increase dose of Lantus and Humalog, continue SSI     Glenroy collins

## 2018-07-26 NOTE — ASSESSMENT & PLAN NOTE
· POA, presented from home with bleeding     · Duoderm q72 hrs, frequent repositioning, pillows for offloading recommended by surgery  · Intermittent oozing of blood  · H/H stable at present  · Serial exams

## 2018-07-26 NOTE — PLAN OF CARE
Problem: PHYSICAL THERAPY ADULT  Goal: Performs mobility at highest level of function for planned discharge setting  See evaluation for individualized goals  Treatment/Interventions: Functional transfer training, LE strengthening/ROM, Therapeutic exercise, Endurance training, Cognitive reorientation, Patient/family training, Bed mobility, Gait training, Equipment eval/education, OT, Spoke to case management, Spoke to nursing  Equipment Recommended: Mary Flash, Wheelchair       See flowsheet documentation for full assessment, interventions and recommendations  Outcome: Not Progressing  Prognosis: Guarded  Problem List: Decreased strength, Decreased range of motion, Decreased endurance, Impaired balance, Decreased mobility, Decreased coordination, Decreased cognition, Obesity, Pain, Decreased skin integrity  Assessment: Patient participated in supine B knee ROM exercises with improvement noted in form and decreased person assist required  Patient refused any OOB mobility tasks requesting to remian supine in bed secondary to knee pain  Continue to encourage transfer and OOB mobility tasks as appropraite as well as knee ROM exercises  Barriers to Discharge: Inaccessible home environment, Decreased caregiver support     Recommendation: Post acute IP rehab          See flowsheet documentation for full assessment

## 2018-07-26 NOTE — PHYSICAL THERAPY NOTE
PHYSICAL THERAPY NOTE    Patient Name: Juventino Salas  [id number] Date: 18 1144   Pain Assessment   Pain Assessment 0-10   Pain Score 4   Pain Type Chronic pain   Pain Location Knee   Pain Orientation Right   Restrictions/Precautions   Weight Bearing Precautions Per Order No   Other Precautions Bed Alarm; Chair Alarm; Fall Risk;Pain   General   Family/Caregiver Present No   Subjective   Subjective Patient supine in bed and is agreeable to therapy session  Patient idenitfers obtained from name,  & ID bracelet  Bed Mobility   Supine to Sit Unable to assess   Sit to Supine Unable to assess   Additional Comments Patient supine in bed pre and post session with refusal to perform OOB mobility tasks  Transfers   Sit to Stand Unable to assess   Stand to Sit Unable to assess   Activity Tolerance   Activity Tolerance Patient limited by pain   Nurse Made Aware Spoke to [place], RN    Exercises   Quad Sets Supine;10 reps;AROM; Bilateral   Heelslides Sitting;5 reps;AROM; Bilateral  (x 2 sets)   Knee AROM Short Arc Quad Supine;10 reps;AROM; Bilateral   Ankle Pumps Supine;10 reps;AROM; Bilateral   Assessment   Prognosis Guarded   Problem List Decreased strength;Decreased range of motion;Decreased endurance; Impaired balance;Decreased mobility; Decreased coordination;Decreased cognition;Obesity;Pain;Decreased skin integrity   Assessment Patient participated in supine B knee ROM exercises with improvement noted in form and decreased person assist required  Patient refused any OOB mobility tasks requesting to remian supine in bed secondary to knee pain  Continue to encourage transfer and OOB mobility tasks as appropraite as well as knee ROM exercises     Barriers to Discharge Inaccessible home environment;Decreased caregiver support   Goals   Patient Goals to have less knee pain and be able to stand   STG Expiration Date 18 Treatment Day 2   Plan   Treatment/Interventions LE strengthening/ROM; Therapeutic exercise; Endurance training;Cognitive reorientation;Patient/family training;Bed mobility;Spoke to nursing   PT Frequency Other (Comment)  (3-5x/week)   Recommendation   Recommendation Post acute IP rehab   Equipment Recommended [place]; Wheelchair       [place], [place]

## 2018-07-26 NOTE — PROGRESS NOTES
Progress Note - Infectious Disease   Sean Forrest 81 y o  female MRN: [id number]  Unit/Bed#: -01 Encounter: [id number]      Impression/Plan:  1  Gram positive bacteremia  Patient is showing staphylococcus aureus and anaerococcus species on 1 set of the original blood cultures  This is likely a contaminant vs a true bacteremia  Patient has been afebrile with only mild leukocytosis  No sepsis on presentation  She had not had any antibiotics during her hospitalization  Repeat cultures  are negative after 24 hours  No indication for antibiotics at this time               -monitor patient off of antibiotics              -monitor CBC              -follow up blood cultures              -follow up repeat blood cultures              -monitor vitals     2  Stage 2 decubitus ulcer L buttock  POA  Present for >1 year  Patient has been following closely with general surgery  Wound is still bleeding intermittently but is clean and not painful  Wound covered in duoderm                -local wound care              -frequent turning and repositioning to offload pressure from L buttock              -follow up general surgery     3  Acute kidney injury on chronic kidney disease stage 3  US of the kidneys and bladder from 7/20/18 showed an atrophic left kidney with renal cortical thinning, echogenic renal cortices bilaterally consistent with medical renal disease, and no hydronephrosis  Reviewed patient's previous creatinine results from [facility], baseline is 1 4-1 6  Patient's creatinine upon admission was 3 56  This has greatly improved and her creatinine decreased to 1 55                -monitor BMP              -adjust antibiotics for renal function as needed     4  Type 2 diabetes mellitus  Patient's last HbA1c = 6 9 on 7/3/2018               -blood glucose management per primary team    Discussed with [facility] attending, Dr Blanca Sosa  Patient is stable for discharge from ID standpoint      Antibiotics:  No current antibiotic use    Subjective:  Patient has no fever, chills, sweats; no nausea, vomiting, diarrhea; no cough, shortness of breath; has pain in her L buttock wound if she puts pressure on it for too long, states she been trying to stay on her sides so that she's never flat on the wound  No new symptoms  Reports that she wants to leave for [facility] today  Objective:  Vitals:  HR:  [] 108  Resp:  [18] 18  BP: (131-174)/(58-82) 131/58  SpO2:  [94 %-97 %] 94 %  Temp (24hrs), Av °F (37 2 °C), Min:98 1 °F (36 7 °C), Max:99 8 °F (37 7 °C)  Current: Temperature: 99 8 °F (37 7 °C)    Physical Exam:   General Appearance:  Alert, interactive, nontoxic, no acute distress  Throat: Oropharynx moist without lesions  Lungs:   Clear to auscultation bilaterally; no wheezes, rhonchi or rales; respirations unlabored   Heart:  Tachycardic; no murmur, rub or gallop   Abdomen:   Soft, non-tender, non-distended, positive bowel sounds  Extremities: No clubbing or cyanosis, +1 pitting edema B/L LE   Skin: No new rashes or lesions   Duoderm intact on L medial buttock     Labs, Imaging, & Other studies:   All pertinent labs and imaging studies were personally reviewed    Results from last 7 days  Lab Units 18  0431 18  1456 18  0627 18  0526   WBC Thousand/uL 11 47*  --  8 58 8 26   HEMOGLOBIN g/dL 9 6* 9 5* 9 1* 8 6*   PLATELETS Thousands/uL 431*  --  388 386       Results from last 7 days  Lab Units 18  0431 18  0627 18  0442  18  1623   SODIUM mmol/L 136 140 137  < > 131*   POTASSIUM mmol/L 4 4 4 2 4 0  < > 4 6   CHLORIDE mmol/L 105 105 105  < > 94*   CO2 mmol/L 25 28 27  < > 30   ANION GAP mmol/L 6 7 5  < > 7   BUN mg/dL 25 30* 34*  < > 77*   CREATININE mg/dL 1 55* 1 62* 1 77*  < > 3 56*   EGFR ml/min/1 73sq m 31 29 26  < > 11   GLUCOSE RANDOM mg/dL 208* 192* 112  < > 287*   CALCIUM mg/dL 9 6 9 0 9 0  < > 9 7   AST U/L  --   --   --   --  13   ALT U/L  --   --   --   --  15 ALK PHOS U/L  --   --   --   --  87   TOTAL PROTEIN g/dL  --   --   --   --  7 4   BILIRUBIN TOTAL mg/dL  --   --   --   --  0 40   < > = values in this interval not displayed  Results from last 7 days  Lab Units 07/20/18  1751 07/20/18  1626 07/20/18  1623   BLOOD CULTURE   --  No Growth After 5 Days   Staphylococcus aureus*  Anaerococcus species*   GRAM STAIN RESULT   --   --  Gram positive cocci in clusters   URINE CULTURE  >100,000 cfu/ml   --   --

## 2018-07-26 NOTE — PROGRESS NOTES
Progress Note - William Charles 1935, 81 y o  female MRN: [id number]    Unit/Bed#: -01 Encounter: [id number]    Primary Care Provider: Shawna Barbosa MD   Date and time admitted to hospital: 7/20/2018  2:39 PM        Positive blood culture   Assessment & Plan    · 1/2 blood cultures from admission positive for staph aureus and anaerococcus  · Mild leucocytosis  · Low grade temperature  · Repeat blood cultures - negative x 24 hours  · Follow-up final blood cultures        Decubitus ulcer of buttock, stage 2   Assessment & Plan    · POA, presented from home with bleeding  · Duoderm q72 hrs, frequent repositioning, pillows for offloading recommended by surgery  · Intermittent oozing of blood  · H/H stable at present  · Serial exams         CKD (chronic kidney disease) stage 3, GFR 30-59 ml/min   Assessment & Plan    Presented with BERTHA which has resolved  Baseline creatinine 1 4 to 1 6 which peaked at 3 56  Continue to hold ACE/HCTZ and Lasix  Was on lasix for chronic venous insufficiency         Ambulatory dysfunction   Assessment & Plan    · PT, OT recommend inpatient rehab - has bed at [facility] - to be discharged in am of 7/26        Type 2 diabetes mellitus with diabetic polyneuropathy, without long-term current use of insulin [facility])   Assessment & Plan      Recent Labs      07/25/18   2120  07/26/18   0717  07/26/18   1127  07/26/18   1557   POCGLU  182*  214*  273*  215*     Blood Sugar Average: Last 72 hrs:  · Hemoglobin A1c 6 9 in July this year  (P) 109 [id number]     Hyperglycemia improved but not at goal    Plan: Increase dose of Lantus and Humalog, continue SSI     Farhat Pila held        Essential hypertension   Assessment & Plan    · Continue beta-blocker  · ACE/HCTZ and Lasix held due to recent BERTHA  · BP acceptable        Candidal intertrigo   Assessment & Plan    · Nystatin powder  · Wound care c/s             VTE Pharmacologic Prophylaxis:   Pharmacologic: Pharmacologic VTE Prophylaxis contraindicated due to bleeding from decubitus ulcer  Mechanical VTE Prophylaxis in Place: Yes    Patient Centered Rounds: I have performed bedside rounds with nursing staff today  Discussions with Specialists or Other Care Team Provider: Discussed with infectious disease    Education and Discussions with Family / Patient: Called [employer] - left a message    Time Spent for Care: 20 minutes  More than 50% of total time spent on counseling and coordination of care as described above  Current Length of Stay: 6 day(s)    Current Patient Status: Inpatient     Discharge Plan: Discharge to [facility] on  at 11 am    Code Status: Level 1 - Full Code    Subjective: Intermittent oozing from gluteal decubitus  Low grade temperature  Complains of constipation  No nausea or vomiting  Objective:     Vitals:   Temp (24hrs), Av 5 °F (37 5 °C), Min:99 °F (37 2 °C), Max:99 8 °F (37 7 °C)    HR:  [] 80  Resp:  [18-19] 19  BP: (131-174)/(58-82) 142/62  SpO2:  [94 %-97 %] 97 %  Body mass index is 38 67 kg/m²  Physical Exam:     Physical Exam   Constitutional: She is oriented to person, place, and time  HENT:   Head: Atraumatic  Eyes: EOM are normal    Neck: Neck supple  No JVD present  No tracheal deviation present  No thyromegaly present  Cardiovascular: Normal rate, regular rhythm and normal heart sounds  Pulmonary/Chest: Effort normal and breath sounds normal  No respiratory distress  She has no wheezes  She has no rales  Abdominal: Soft  Bowel sounds are normal  She exhibits no distension  There is no tenderness  There is no rebound  Musculoskeletal: She exhibits no edema  Neurological: She is alert and oriented to person, place, and time  Skin:   Left gluteal decubitus   Psychiatric: She has a normal mood and affect         Additional Data:     Labs:      Results from last 7 days  Lab Units 18  1108   WBC Thousand/uL 11 64*   HEMOGLOBIN g/dL 9 2*   HEMATOCRIT % 27 9*   PLATELETS Thousands/uL 303   NEUTROS PCT % 89*   LYMPHS PCT % 3*   MONOS PCT % 5   EOS PCT % 3       Results from last 7 days  Lab Units 07/25/18  0431  07/20/18  1623   SODIUM mmol/L 136  < > 131*   POTASSIUM mmol/L 4 4  < > 4 6   CHLORIDE mmol/L 105  < > 94*   CO2 mmol/L 25  < > 30   BUN mg/dL 25  < > 77*   CREATININE mg/dL 1 55*  < > 3 56*   CALCIUM mg/dL 9 6  < > 9 7   TOTAL PROTEIN g/dL  --   --  7 4   BILIRUBIN TOTAL mg/dL  --   --  0 40   ALK PHOS U/L  --   --  87   ALT U/L  --   --  15   AST U/L  --   --  13   GLUCOSE RANDOM mg/dL 208*  < > 287*   < > = values in this interval not displayed  Results from last 7 days  Lab Units 07/26/18  1557 07/26/18  1127 07/26/18  0717 07/25/18  2120 07/25/18  1600 07/25/18  1135 07/25/18  0718 07/24/18  2046 07/24/18  1557 07/24/18  1132 07/24/18  0714 07/23/18  2052   POC GLUCOSE mg/dl 215* 273* 214* 182* 244* 362* 172* 203* 236* 313* 177* 224*             * I Have Reviewed All Lab Data Listed Above  * Additional Pertinent Lab Tests Reviewed:  Dominique 66 Admission Reviewed    Last 24 Hours Medication List:     Current Facility-Administered Medications:  acetaminophen 650 mg Oral Q6H PRN Thi Lang MD   aspirin 81 mg Oral Daily Madeline Ferrer MD   bimatoprost 1 drop Both Eyes HS Thi Lang MD   calcium carbonate 500 mg Oral Daily PRN Em Ross PA-C   cholecalciferol 1,000 Units Oral Daily Jamarcus Ferrer MD   docusate sodium 100 mg Oral BID PRN Thi Lang MD   HYDROcodone-acetaminophen 1 tablet Oral BID PRN Thi Lang MD   insulin glargine 12 Units Subcutaneous HS Lilli Brothers MD   insulin lispro 1-5 Units Subcutaneous TID Arianna Ferrer MD   insulin lispro 1-5 Units Subcutaneous HS Madeline Ferrer MD   insulin lispro 5 Units Subcutaneous TID With Meals Lilli Brothers MD   levothyroxine 75 mcg Oral Early Morning Thi Lang MD   metoprolol tartrate 25 mg Oral Q12H [address] Jamarcus P MD Carissa   nystatin  Topical BID Charlesetta Kehr, MD   ondansetron 4 mg Intravenous Q6H PRN Charlesetta Kehr, MD   polyethylene glycol 17 g Oral Daily Jacob Santos MD   pravastatin 40 mg Oral Daily With Carmelo Duane, MD   prednisoLONE acetate 1 drop Both Eyes BID Charlesetta Kehr, MD   timolol 1 drop Right Eye Daily Charlesetta Kehr, MD        Today, Patient Was Seen By: Jacob Santos MD    ** Please Note: Dictation voice to text software may have been used in the creation of this document   **

## 2018-07-26 NOTE — CASE MANAGEMENT
Thank you,  Cayden Aqq  [facility] Utilization Review Department  Phone: [phone]; Fax [phone]  ATTENTION: The Network Utilization Review Department is now centralized for our 9 Facilities  Make a note that we have a new phone and fax numbers for our Department  Please call with any questions or concerns to [phone] and carefully follow the prompts so that you are directed to the right person  All voicemails are confidential  Fax any determinations, approvals, denials, and requests for initial or continue stay review clinical to [phone]  Due to HIGH CALL volume, it would be easier if you could please send faxed requests to expedite your requests and in part, help us provide discharge notifications faster    Continued Stay Review    Date: 07/26/2018    Vital Signs: /62 (BP Location: Left arm)   Pulse 80   Temp 99 7 °F (37 6 °C) (Oral)   Resp 19   Ht 5' 2" (1 575 m)   Wt 95 9 kg (211 lb 6 7 oz)   SpO2 97%   BMI 38 67 kg/m²     Medications:   Scheduled Meds:   Current Facility-Administered Medications:  acetaminophen 650 mg Oral Q6H PRN   aspirin 81 mg Oral Daily   bimatoprost 1 drop Both Eyes HS   calcium carbonate 500 mg Oral Daily PRN   cholecalciferol 1,000 Units Oral Daily   docusate sodium 100 mg Oral BID PRN   HYDROcodone-acetaminophen 1 tablet Oral BID PRN   insulin glargine 12 Units Subcutaneous HS   insulin lispro 1-5 Units Subcutaneous TID AC   insulin lispro 1-5 Units Subcutaneous HS   insulin lispro 5 Units Subcutaneous TID With Meals   levothyroxine 75 mcg Oral Early Morning   metoprolol tartrate 25 mg Oral Q12H SABRINA   nystatin  Topical BID   ondansetron 4 mg Intravenous Q6H PRN   pravastatin 40 mg Oral Daily With Dinner   prednisoLONE acetate 1 drop Both Eyes BID   timolol 1 drop Right Eye Daily     Continuous Infusions:    PRN Meds:   acetaminophen    calcium carbonate    docusate sodium    HYDROcodone-acetaminophen    ondansetron    Abnormal Labs/Diagnostic Results:   WBC 11 64     RBC 3 06     Hemoglobin 9 2           Age/Sex: 81 y o  female     Assessment/Plan: 07/25/2018 Infectious Disease MD     Gram-positive bacteremia-with 2 organisms isolated from 1 set  One must always consider the possibility of Staphylococcus aureus being a true bacteremia even when only 1/2 sets is positive  However the patient had no clinical sepsis on presentation, and has no localizing findings to suggest infectious process  Further with 2 organisms found in 1 blood culture this does suggest contamination  Patient has remained stable without any fever despite being off all antibiotics during the entire hospital stay  She has no murmur noted on exam and has no history of any type of prosthesis  -monitor off all antibiotics for now  -follow up repeat blood cultures  -if follow-up blood cultures are positive, will need to initiate antibiotic therapy and check an echocardiogram  -no additional ID workup for now     2   Stage II left buttock ulcer-no clinical evidence of an active infectious process    -local wound care  -surgical follow-up  -no need to treat with antibiotics            Discharge Plan: TBD

## 2018-07-27 VITALS
TEMPERATURE: 98.7 F | BODY MASS INDEX: 38.91 KG/M2 | WEIGHT: 211.42 LBS | DIASTOLIC BLOOD PRESSURE: 60 MMHG | RESPIRATION RATE: 18 BRPM | HEIGHT: 62 IN | OXYGEN SATURATION: 95 % | SYSTOLIC BLOOD PRESSURE: 124 MMHG | HEART RATE: 73 BPM

## 2018-07-27 LAB
GLUCOSE SERPL-MCNC: 179 MG/DL (ref 65–140)
GLUCOSE SERPL-MCNC: 229 MG/DL (ref 65–140)

## 2018-07-27 PROCEDURE — 99239 HOSP IP/OBS DSCHRG MGMT >30: CPT | Performed by: INTERNAL MEDICINE

## 2018-07-27 PROCEDURE — 82948 REAGENT STRIP/BLOOD GLUCOSE: CPT

## 2018-07-27 PROCEDURE — 99232 SBSQ HOSP IP/OBS MODERATE 35: CPT | Performed by: NURSE PRACTITIONER

## 2018-07-27 RX ORDER — PREDNISOLONE ACETATE 10 MG/ML
1 SUSPENSION/ DROPS OPHTHALMIC 2 TIMES DAILY
Qty: 5 ML | Refills: 0
Start: 2018-07-27

## 2018-07-27 RX ORDER — NYSTATIN 100000 [USP'U]/G
POWDER TOPICAL 2 TIMES DAILY
Qty: 15 G | Refills: 0
Start: 2018-07-27 | End: 2020-08-27 | Stop reason: ALTCHOICE

## 2018-07-27 RX ORDER — PREDNISOLONE ACETATE 10 MG/ML
1 SUSPENSION/ DROPS OPHTHALMIC 2 TIMES DAILY
Qty: 5 ML | Refills: 0 | Status: SHIPPED | OUTPATIENT
Start: 2018-07-27 | End: 2018-07-27

## 2018-07-27 RX ORDER — POLYETHYLENE GLYCOL 3350 17 G/17G
17 POWDER, FOR SOLUTION ORAL DAILY
Qty: 14 EACH | Refills: 0
Start: 2018-07-28 | End: 2020-08-27 | Stop reason: ALTCHOICE

## 2018-07-27 RX ORDER — INSULIN GLARGINE 100 [IU]/ML
12 INJECTION, SOLUTION SUBCUTANEOUS
Qty: 10 ML | Refills: 0 | Status: ON HOLD
Start: 2018-07-27 | End: 2018-08-07

## 2018-07-27 RX ORDER — DOCUSATE SODIUM 100 MG/1
100 CAPSULE, LIQUID FILLED ORAL 2 TIMES DAILY
Qty: 10 CAPSULE | Refills: 0
Start: 2018-07-27

## 2018-07-27 RX ORDER — HYDROCODONE BITARTRATE AND ACETAMINOPHEN 5; 300 MG/1; MG/1
0.5 TABLET ORAL 2 TIMES DAILY PRN
Qty: 10 TABLET | Refills: 0 | Status: SHIPPED | OUTPATIENT
Start: 2018-07-27 | End: 2018-08-10 | Stop reason: HOSPADM

## 2018-07-27 RX ORDER — DOCUSATE SODIUM 100 MG/1
100 CAPSULE, LIQUID FILLED ORAL 2 TIMES DAILY
Qty: 10 CAPSULE | Refills: 0 | Status: SHIPPED | OUTPATIENT
Start: 2018-07-27 | End: 2018-07-27

## 2018-07-27 RX ADMIN — METOPROLOL TARTRATE 25 MG: 25 TABLET ORAL at 08:12

## 2018-07-27 RX ADMIN — ASPIRIN 81 MG: 81 TABLET, COATED ORAL at 08:13

## 2018-07-27 RX ADMIN — NYSTATIN: 100000 POWDER TOPICAL at 08:14

## 2018-07-27 RX ADMIN — VITAMIN D, TAB 1000IU (100/BT) 1000 UNITS: 25 TAB at 08:13

## 2018-07-27 RX ADMIN — INSULIN LISPRO 1 UNITS: 100 INJECTION, SOLUTION INTRAVENOUS; SUBCUTANEOUS at 08:15

## 2018-07-27 RX ADMIN — LEVOTHYROXINE SODIUM 75 MCG: 75 TABLET ORAL at 05:24

## 2018-07-27 RX ADMIN — POLYETHYLENE GLYCOL 3350 17 G: 17 POWDER, FOR SOLUTION ORAL at 08:12

## 2018-07-27 RX ADMIN — TIMOLOL MALEATE 1 DROP: 5 SOLUTION/ DROPS OPHTHALMIC at 08:14

## 2018-07-27 RX ADMIN — PREDNISOLONE ACETATE 1 DROP: 10 SUSPENSION/ DROPS OPHTHALMIC at 08:14

## 2018-07-27 RX ADMIN — CALCIUM CARBONATE 500 MG: 500 TABLET, CHEWABLE ORAL at 00:21

## 2018-07-27 NOTE — ASSESSMENT & PLAN NOTE
· POA, presented from home with bleeding     · Duoderm q72 hrs, frequent repositioning, pillows for offloading recommended by surgery  · No bleeding at present  · H/H stable at present  · Serial exams

## 2018-07-27 NOTE — SOCIAL WORK
Pt set up for an 11am BLS transport via [facility] to [facility] today  S/w Shameka at [employer] to adjust auth dates  S/w Maame at [facility] to make her aware of changes and p/u time  Nurse Paco Munoz, pt's son Gilford Boom and Dr Azucena Hernandez aware

## 2018-07-27 NOTE — PROGRESS NOTES
Progress Note - Infectious Disease   Oksana Tan 81 y o  female MRN: [id number]  Unit/Bed#: -01 Encounter: [id number]      Impression/Plan:  1  Gram positive bacteremia  Patient is showed staphylococcus aureus and anaerococcus species on 1 set of the original blood cultures  This is likely a contaminant vs a true bacteremia  Patient is afebrile with only mild leukocytosis  No sepsis on presentation  She has not had any antibiotics during her hospitalization  Repeat cultures remain negative  No indication for antibiotics at this time               -monitor patient off of antibiotics              -monitor CBC              -follow up blood cultures              -follow up repeat blood cultures              -monitor vitals     2  Stage 2 decubitus ulcer L buttock  POA  Present for >1 year  Patient has been following closely with general surgery  Wound is still bleeding intermittently but is clean and not painful  Wound covered in duoderm                -local wound care              -MLOOELFH turning and repositioning to offload pressure from L buttock              -follow up general surgery     3  Acute kidney injury on chronic kidney disease stage 3  US of the kidneys and bladder from 7/20/18 showed an atrophic left kidney with renal cortical thinning, echogenic renal cortices bilaterally consistent with medical renal disease, and no hydronephrosis  Reviewed patient's previous creatinine results from [facility], baseline is 1 4-1 6  Patient's creatinine upon admission was 3 56  This has greatly improved               -monitor BMP              -adjust antibiotics for renal function as needed     4  Type 2 diabetes mellitus  Patient's last HbA1c = 6 9 on 7/3/2018               -blood glucose management per primary team    Patient leaving for [facility] later today  She is stable for discharge from ID standpoint  Discussed in detail with [facility] attending, Dr Steve Johnson      Antibiotics:  No current antibiotic use    Subjective:  Patient has no fever, chills, sweats; no nausea, vomiting, diarrhea; no cough, shortness of breath; still with mild pain in the L buttock wound if she lays on it for too long  No new symptoms  Objective:  Vitals:  HR:  [80-90] 84  Resp:  [18-19] 18  BP: (118-164)/(58-76) 164/76  SpO2:  [95 %-97 %] 95 %  Temp (24hrs), Av °F (37 2 °C), Min:98 7 °F (37 1 °C), Max:99 7 °F (37 6 °C)  Current: Temperature: 98 7 °F (37 1 °C)    Physical Exam:   General Appearance:  Alert, interactive, nontoxic, no acute distress  Throat: Oropharynx moist without lesions  Lungs:   Clear to auscultation bilaterally; no wheezes, rhonchi or rales; respirations unlabored   Heart:  RRR; no murmur, rub or gallop   Abdomen:   Soft, non-tender, non-distended, positive bowel sounds  Extremities: No clubbing or cyanosis, B/L lower leg +1 pitting edema   Skin: No new rashes or lesions  No draining wounds noted  L medial buttock wound with duoderm       Labs, Imaging, & Other studies:   All pertinent labs and imaging studies were personally reviewed    Results from last 7 days  Lab Units 18  1108 18  0431 18  1456 18  0627   WBC Thousand/uL 11 64* 11 47*  --  8 58   HEMOGLOBIN g/dL 9 2* 9 6* 9 5* 9 1*   PLATELETS Thousands/uL 303 431*  --  388       Results from last 7 days  Lab Units 18  0431 18  0627 18  0442  18  1623   SODIUM mmol/L 136 140 137  < > 131*   POTASSIUM mmol/L 4 4 4 2 4 0  < > 4 6   CHLORIDE mmol/L 105 105 105  < > 94*   CO2 mmol/L 25 28 27  < > 30   ANION GAP mmol/L 6 7 5  < > 7   BUN mg/dL 25 30* 34*  < > 77*   CREATININE mg/dL 1 55* 1 62* 1 77*  < > 3 56*   EGFR ml/min/1 73sq m 31 29 26  < > 11   GLUCOSE RANDOM mg/dL 208* 192* 112  < > 287*   CALCIUM mg/dL 9 6 9 0 9 0  < > 9 7   AST U/L  --   --   --   --  13   ALT U/L  --   --   --   --  15   ALK PHOS U/L  --   --   --   --  87   TOTAL PROTEIN g/dL  --   --   --   --  7 4   BILIRUBIN TOTAL mg/dL  -- --   --   --  0 40   < > = values in this interval not displayed  Results from last 7 days  Lab Units 07/25/18  1012 07/20/18  1751 07/20/18  1626 07/20/18  1623   BLOOD CULTURE  No Growth at 24 hrs  No Growth at 24 hrs   --  No Growth After 5 Days   Staphylococcus aureus*  Anaerococcus species*   GRAM STAIN RESULT   --   --   --  Gram positive cocci in clusters   URINE CULTURE   --  >100,000 cfu/ml   --   --

## 2018-07-27 NOTE — ASSESSMENT & PLAN NOTE
Recent Labs      07/26/18   1127  07/26/18   1557  07/26/18 2058  07/27/18   0716   POCGLU  273*  215*  210*  179*     Blood Sugar Average: Last 72 hrs:  · Hemoglobin A1c 6 9 in July this year  (P) 289 [id number]     Hyperglycemia improving gradually   Discharge on increased dose of Lantus/Humalog  Restart Tradjenta   Monitor blood sugars at [facility] and adjust as needed

## 2018-07-27 NOTE — PROGRESS NOTES
Progress Note - Johnie Ruth 1935, 81 y o  female MRN: [id number]    Unit/Bed#: -01 Encounter: [id number]    Primary Care Provider: Ifeanyi Arora MD   Date and time admitted to hospital: 7/20/2018  2:39 PM        Decubitus ulcer of buttock, stage 2   Assessment & Plan    · POA, presented from home with bleeding     · Duoderm q72 hrs, frequent repositioning, pillows for offloading recommended by surgery  · No bleeding at present  · H/H stable at present  · Serial exams         Positive blood culture   Assessment & Plan    · 1/2 blood cultures from admission positive for staph aureus and anaerococcus  · Repeat blood cultures - negative x 24 hours  · Follow-up final blood cultures on discharge  · Cleared by ID for discharge        CKD (chronic kidney disease) stage 3, GFR 30-59 ml/min   Assessment & Plan    Presented with BERTHA which has resolved  Baseline creatinine 1 4 to 1 6 which peaked at 3 56  Continue to hold ACE/HCTZ and Lasix  Was on lasix for chronic venous insufficiency         Ambulatory dysfunction   Assessment & Plan    · PT, OT recommend inpatient rehab - discharge to [facility] today        Type 2 diabetes mellitus with diabetic polyneuropathy, without long-term current use of insulin [facility])   Assessment & Plan      Recent Labs      07/26/18   1127  07/26/18   1557  07/26/18   2058  07/27/18   0716   POCGLU  273*  215*  210*  179*     Blood Sugar Average: Last 72 hrs:  · Hemoglobin A1c 6 9 in July this year  (P) 658 [id number]     Hyperglycemia improving gradually   Discharge on increased dose of Lantus/Humalog  Restart Tradjenta   Monitor blood sugars at [facility] and adjust as needed        Essential hypertension   Assessment & Plan    · Continue beta-blocker  · ACE/HCTZ and Lasix held due to recent BERTHA  · BP acceptable        Anemia   Assessment & Plan    · Baseline hgb 11-12, stable in low to mid 9's at present  · Likely due to bleeding from decubitus ulcer           Candidal intertrigo   Assessment & Plan    · Nystatin powder  · Wound care         Morbid obesity (HCC)   Assessment & Plan    · Recommend diet and lifestyle modifications   · BMI noted at 38            VTE Pharmacologic Prophylaxis:   Pharmacologic: Pharmacologic VTE Prophylaxis contraindicated due to bleeding from decubitus ulcer  Mechanical VTE Prophylaxis in Place: Yes    Patient Centered Rounds: I have performed bedside rounds with nursing staff today  Education and Discussions with Family / Patient: Discussed with piedad Ignacio on the phone    Time Spent for Care: 20 minutes  More than 50% of total time spent on counseling and coordination of care as described above  Current Length of Stay: 7 day(s)    Current Patient Status: Inpatient     Discharge Plan: Discharge to [facility] today    Code Status: Level 1 - Full Code    Subjective:   No bleeding from decubitus ulcer  No fever  Feels well  Objective:     Vitals:   Temp (24hrs), Av °F (37 2 °C), Min:98 7 °F (37 1 °C), Max:99 7 °F (37 6 °C)    HR:  [80-90] 84  Resp:  [18-19] 18  BP: (118-164)/(58-76) 164/76  SpO2:  [95 %-97 %] 95 %  Body mass index is 38 67 kg/m²  Physical Exam:     Physical Exam   Constitutional: She is oriented to person, place, and time  HENT:   Head: Atraumatic  Eyes: EOM are normal    Neck: Neck supple  No JVD present  No tracheal deviation present  No thyromegaly present  Cardiovascular: Normal rate, regular rhythm and normal heart sounds  Pulmonary/Chest: Effort normal  No respiratory distress  She has no wheezes  She has no rales  Abdominal: Soft  Bowel sounds are normal  She exhibits no distension  There is no tenderness  There is no rebound  Musculoskeletal: She exhibits no edema  Neurological: She is alert and oriented to person, place, and time  Skin:   Left gluteal decubitus   Psychiatric: She has a normal mood and affect         Additional Data:     Labs:      Results from last 7 days  Lab Units 18  1108   WBC Thousand/uL 11 64*   HEMOGLOBIN g/dL 9 2*   HEMATOCRIT % 27 9*   PLATELETS Thousands/uL 303   NEUTROS PCT % 89*   LYMPHS PCT % 3*   MONOS PCT % 5   EOS PCT % 3       Results from last 7 days  Lab Units 07/25/18  0431  07/20/18  1623   SODIUM mmol/L 136  < > 131*   POTASSIUM mmol/L 4 4  < > 4 6   CHLORIDE mmol/L 105  < > 94*   CO2 mmol/L 25  < > 30   BUN mg/dL 25  < > 77*   CREATININE mg/dL 1 55*  < > 3 56*   CALCIUM mg/dL 9 6  < > 9 7   TOTAL PROTEIN g/dL  --   --  7 4   BILIRUBIN TOTAL mg/dL  --   --  0 40   ALK PHOS U/L  --   --  87   ALT U/L  --   --  15   AST U/L  --   --  13   GLUCOSE RANDOM mg/dL 208*  < > 287*   < > = values in this interval not displayed  * I Have Reviewed All Lab Data Listed Above  * Additional Pertinent Lab Tests Reviewed:  Dominique Ferraro Admission Reviewed        Last 24 Hours Medication List:     Current Facility-Administered Medications:  acetaminophen 650 mg Oral Q6H PRN Armando Armendariz MD   aspirin 81 mg Oral Daily Armando Armendariz MD   bimatoprost 1 drop Both Eyes HS Armando Armendariz MD   calcium carbonate 500 mg Oral Daily PRN Em Ross PA-C   cholecalciferol 1,000 Units Oral Daily Jamarcus Ferrer MD   docusate sodium 100 mg Oral BID PRN Armando Armendariz MD   HYDROcodone-acetaminophen 1 tablet Oral BID PRN Armando Armendariz MD   insulin glargine 12 Units Subcutaneous HS Chacha Morgan MD   insulin lispro 1-5 Units Subcutaneous TID Arun Espinoza MD   insulin lispro 1-5 Units Subcutaneous HS Claudean Pump MD Carissa   insulin lispro 5 Units Subcutaneous TID With Meals Chacha Morgan MD   levothyroxine 75 mcg Oral Early Morning Armando Armendariz MD   metoprolol tartrate 25 mg Oral Q12H SABRINA Armando Armendariz MD   nystatin  Topical BID Armando Armendariz MD   ondansetron 4 mg Intravenous Q6H PRN Armando Armendariz MD   polyethylene glycol 17 g Oral Daily Chacha Morgan MD   pravastatin 40 mg Oral Daily With Vianney Shipman MD prednisoLONE acetate 1 drop Both Eyes BID Budd Leyden, MD   timolol 1 drop Right Eye Daily Budd Leyden, MD        Today, Patient Was Seen By: Sobeida Wset MD    ** Please Note: Dictation voice to text software may have been used in the creation of this document   **

## 2018-07-27 NOTE — ASSESSMENT & PLAN NOTE
· 1/2 blood cultures from admission positive for staph aureus and anaerococcus  · Repeat blood cultures - negative x 24 hours  · Follow-up final blood cultures on discharge  · Cleared by ID for discharge

## 2018-07-27 NOTE — ASSESSMENT & PLAN NOTE
· Baseline hgb 11-12, stable in low to mid 9's at present  · Likely due to bleeding from decubitus ulcer

## 2018-07-30 LAB
BACTERIA BLD CULT: ABNORMAL
BACTERIA BLD CULT: ABNORMAL
BACTERIA BLD CULT: NORMAL
BACTERIA BLD CULT: NORMAL
GRAM STN SPEC: ABNORMAL

## 2018-07-30 NOTE — PROGRESS NOTES
Chart reviewed  Patient was admitted to the hospital  She had one positive blood culture, and blood cultures were redrawn  Repeat blood cultures x2 were reviewed and there was no growth  Likely was initial contamination

## 2018-08-01 ENCOUNTER — HOSPITAL ENCOUNTER (EMERGENCY)
Facility: HOSPITAL | Age: 83
Discharge: NURSING, REHAB OR LONG-TERM CARE | End: 2018-08-02
Attending: EMERGENCY MEDICINE | Admitting: EMERGENCY MEDICINE
Payer: COMMERCIAL

## 2018-08-01 DIAGNOSIS — D64.9 ANEMIA, UNSPECIFIED TYPE: ICD-10-CM

## 2018-08-01 DIAGNOSIS — R73.9 HYPERGLYCEMIA: Primary | ICD-10-CM

## 2018-08-01 LAB
ABO GROUP BLD: NORMAL
ACETONE SERPL-MCNC: NEGATIVE MG/DL
ALBUMIN SERPL BCP-MCNC: 1.9 G/DL (ref 3.5–5)
ALP SERPL-CCNC: 127 U/L (ref 46–116)
ALT SERPL W P-5'-P-CCNC: 39 U/L (ref 12–78)
ANION GAP SERPL CALCULATED.3IONS-SCNC: 8 MMOL/L (ref 4–13)
AST SERPL W P-5'-P-CCNC: 22 U/L (ref 5–45)
BASOPHILS # BLD MANUAL: 0 THOUSAND/UL (ref 0–0.1)
BASOPHILS NFR MAR MANUAL: 0 % (ref 0–1)
BILIRUB SERPL-MCNC: 0.2 MG/DL (ref 0.2–1)
BLD GP AB SCN SERPL QL: NEGATIVE
BUN SERPL-MCNC: 63 MG/DL (ref 5–25)
CALCIUM SERPL-MCNC: 9 MG/DL (ref 8.3–10.1)
CHLORIDE SERPL-SCNC: 97 MMOL/L (ref 100–108)
CO2 SERPL-SCNC: 24 MMOL/L (ref 21–32)
CREAT SERPL-MCNC: 1.84 MG/DL (ref 0.6–1.3)
EOSINOPHIL # BLD MANUAL: 0 THOUSAND/UL (ref 0–0.4)
EOSINOPHIL NFR BLD MANUAL: 0 % (ref 0–6)
ERYTHROCYTE [DISTWIDTH] IN BLOOD BY AUTOMATED COUNT: 13.8 % (ref 11.6–15.1)
GFR SERPL CREATININE-BSD FRML MDRD: 25 ML/MIN/1.73SQ M
GLUCOSE SERPL-MCNC: 432 MG/DL (ref 65–140)
GLUCOSE SERPL-MCNC: 438 MG/DL (ref 65–140)
GLUCOSE SERPL-MCNC: 459 MG/DL (ref 65–140)
GLUCOSE SERPL-MCNC: 491 MG/DL (ref 65–140)
HCT VFR BLD AUTO: 24 % (ref 34.8–46.1)
HGB BLD-MCNC: 7.7 G/DL (ref 11.5–15.4)
LYMPHOCYTES # BLD AUTO: 1.44 THOUSAND/UL (ref 0.6–4.47)
LYMPHOCYTES # BLD AUTO: 8 % (ref 14–44)
MCH RBC QN AUTO: 29.4 PG (ref 26.8–34.3)
MCHC RBC AUTO-ENTMCNC: 32.1 G/DL (ref 31.4–37.4)
MCV RBC AUTO: 92 FL (ref 82–98)
METAMYELOCYTES NFR BLD MANUAL: 1 % (ref 0–1)
MONOCYTES # BLD AUTO: 0.54 THOUSAND/UL (ref 0–1.22)
MONOCYTES NFR BLD: 3 % (ref 4–12)
NEUTROPHILS # BLD MANUAL: 15.8 THOUSAND/UL (ref 1.85–7.62)
NEUTS BAND NFR BLD MANUAL: 1 % (ref 0–8)
NEUTS SEG NFR BLD AUTO: 87 % (ref 43–75)
NRBC BLD AUTO-RTO: 0 /100 WBCS
PLATELET # BLD AUTO: 696 THOUSANDS/UL (ref 149–390)
PLATELET BLD QL SMEAR: ABNORMAL
PMV BLD AUTO: 9.6 FL (ref 8.9–12.7)
POLYCHROMASIA BLD QL SMEAR: PRESENT
POTASSIUM SERPL-SCNC: 4.5 MMOL/L (ref 3.5–5.3)
PROT SERPL-MCNC: 6.9 G/DL (ref 6.4–8.2)
RBC # BLD AUTO: 2.62 MILLION/UL (ref 3.81–5.12)
RH BLD: POSITIVE
SODIUM SERPL-SCNC: 129 MMOL/L (ref 136–145)
SPECIMEN EXPIRATION DATE: NORMAL
TOTAL CELLS COUNTED SPEC: 100
TOXIC GRANULES BLD QL SMEAR: PRESENT
WBC # BLD AUTO: 17.95 THOUSAND/UL (ref 4.31–10.16)

## 2018-08-01 PROCEDURE — 80053 COMPREHEN METABOLIC PANEL: CPT | Performed by: EMERGENCY MEDICINE

## 2018-08-01 PROCEDURE — 82948 REAGENT STRIP/BLOOD GLUCOSE: CPT

## 2018-08-01 PROCEDURE — 85027 COMPLETE CBC AUTOMATED: CPT | Performed by: EMERGENCY MEDICINE

## 2018-08-01 PROCEDURE — 96372 THER/PROPH/DIAG INJ SC/IM: CPT

## 2018-08-01 PROCEDURE — 86920 COMPATIBILITY TEST SPIN: CPT

## 2018-08-01 PROCEDURE — 86901 BLOOD TYPING SEROLOGIC RH(D): CPT | Performed by: EMERGENCY MEDICINE

## 2018-08-01 PROCEDURE — 85007 BL SMEAR W/DIFF WBC COUNT: CPT | Performed by: EMERGENCY MEDICINE

## 2018-08-01 PROCEDURE — 86900 BLOOD TYPING SEROLOGIC ABO: CPT | Performed by: EMERGENCY MEDICINE

## 2018-08-01 PROCEDURE — P9016 RBC LEUKOCYTES REDUCED: HCPCS

## 2018-08-01 PROCEDURE — 86850 RBC ANTIBODY SCREEN: CPT | Performed by: EMERGENCY MEDICINE

## 2018-08-01 PROCEDURE — 96374 THER/PROPH/DIAG INJ IV PUSH: CPT

## 2018-08-01 PROCEDURE — 82009 KETONE BODYS QUAL: CPT | Performed by: EMERGENCY MEDICINE

## 2018-08-01 PROCEDURE — 96361 HYDRATE IV INFUSION ADD-ON: CPT

## 2018-08-01 PROCEDURE — 36430 TRANSFUSION BLD/BLD COMPNT: CPT

## 2018-08-01 PROCEDURE — 36415 COLL VENOUS BLD VENIPUNCTURE: CPT | Performed by: EMERGENCY MEDICINE

## 2018-08-01 RX ORDER — INSULIN GLARGINE 100 [IU]/ML
12 INJECTION, SOLUTION SUBCUTANEOUS ONCE
Status: COMPLETED | OUTPATIENT
Start: 2018-08-01 | End: 2018-08-01

## 2018-08-01 RX ADMIN — INSULIN GLARGINE 12 UNITS: 100 INJECTION, SOLUTION SUBCUTANEOUS at 23:45

## 2018-08-01 RX ADMIN — INSULIN HUMAN 15 UNITS: 100 INJECTION, SOLUTION PARENTERAL at 22:54

## 2018-08-01 RX ADMIN — SODIUM CHLORIDE 1000 ML: 0.9 INJECTION, SOLUTION INTRAVENOUS at 21:25

## 2018-08-02 ENCOUNTER — APPOINTMENT (EMERGENCY)
Dept: RADIOLOGY | Facility: HOSPITAL | Age: 83
DRG: 378 | End: 2018-08-02
Payer: COMMERCIAL

## 2018-08-02 ENCOUNTER — HOSPITAL ENCOUNTER (INPATIENT)
Facility: HOSPITAL | Age: 83
LOS: 8 days | Discharge: NURSING, REHAB OR LONG-TERM CARE | DRG: 378 | End: 2018-08-10
Attending: EMERGENCY MEDICINE | Admitting: INTERNAL MEDICINE
Payer: COMMERCIAL

## 2018-08-02 VITALS
SYSTOLIC BLOOD PRESSURE: 127 MMHG | DIASTOLIC BLOOD PRESSURE: 60 MMHG | BODY MASS INDEX: 40.28 KG/M2 | OXYGEN SATURATION: 98 % | HEART RATE: 98 BPM | TEMPERATURE: 98.3 F | WEIGHT: 220.24 LBS | RESPIRATION RATE: 18 BRPM

## 2018-08-02 DIAGNOSIS — E11.42 TYPE 2 DIABETES MELLITUS WITH DIABETIC POLYNEUROPATHY, WITHOUT LONG-TERM CURRENT USE OF INSULIN (HCC): ICD-10-CM

## 2018-08-02 DIAGNOSIS — L89.322 DECUBITUS ULCER OF LEFT BUTTOCK, STAGE 2 (HCC): ICD-10-CM

## 2018-08-02 DIAGNOSIS — K92.2 GI BLEED: Primary | ICD-10-CM

## 2018-08-02 DIAGNOSIS — D62 ACUTE BLOOD LOSS ANEMIA: ICD-10-CM

## 2018-08-02 DIAGNOSIS — D50.0 IRON DEFICIENCY ANEMIA DUE TO CHRONIC BLOOD LOSS: ICD-10-CM

## 2018-08-02 LAB
ABO GROUP BLD BPU: NORMAL
ABO GROUP BLD: NORMAL
ALBUMIN SERPL BCP-MCNC: 1.7 G/DL (ref 3.5–5)
ALP SERPL-CCNC: 109 U/L (ref 46–116)
ALT SERPL W P-5'-P-CCNC: 32 U/L (ref 12–78)
AMMONIA PLAS-SCNC: 37 UMOL/L (ref 11–35)
ANION GAP SERPL CALCULATED.3IONS-SCNC: 8 MMOL/L (ref 4–13)
ANISOCYTOSIS BLD QL SMEAR: PRESENT
APTT PPP: 34 SECONDS (ref 24–36)
AST SERPL W P-5'-P-CCNC: 30 U/L (ref 5–45)
BASOPHILS # BLD MANUAL: 0.18 THOUSAND/UL (ref 0–0.1)
BASOPHILS NFR MAR MANUAL: 1 % (ref 0–1)
BILIRUB SERPL-MCNC: 0.35 MG/DL (ref 0.2–1)
BLD GP AB SCN SERPL QL: NEGATIVE
BPU ID: NORMAL
BUN SERPL-MCNC: 55 MG/DL (ref 5–25)
CALCIUM SERPL-MCNC: 8.9 MG/DL (ref 8.3–10.1)
CHLORIDE SERPL-SCNC: 103 MMOL/L (ref 100–108)
CO2 SERPL-SCNC: 23 MMOL/L (ref 21–32)
CREAT SERPL-MCNC: 1.43 MG/DL (ref 0.6–1.3)
CROSSMATCH: NORMAL
EOSINOPHIL # BLD MANUAL: 0.18 THOUSAND/UL (ref 0–0.4)
EOSINOPHIL NFR BLD MANUAL: 1 % (ref 0–6)
ERYTHROCYTE [DISTWIDTH] IN BLOOD BY AUTOMATED COUNT: 14.2 % (ref 11.6–15.1)
EST. AVERAGE GLUCOSE BLD GHB EST-MCNC: 171 MG/DL
GFR SERPL CREATININE-BSD FRML MDRD: 34 ML/MIN/1.73SQ M
GLUCOSE SERPL-MCNC: 317 MG/DL (ref 65–140)
GLUCOSE SERPL-MCNC: 325 MG/DL (ref 65–140)
GLUCOSE SERPL-MCNC: 372 MG/DL (ref 65–140)
GLUCOSE SERPL-MCNC: 423 MG/DL (ref 65–140)
HBA1C MFR BLD: 7.6 % (ref 4.2–6.3)
HCT VFR BLD AUTO: 24.7 % (ref 34.8–46.1)
HGB BLD-MCNC: 7.2 G/DL (ref 11.5–15.4)
HGB BLD-MCNC: 7.7 G/DL (ref 11.5–15.4)
INR PPP: 1.23 (ref 0.86–1.17)
LACTATE SERPL-SCNC: 1.3 MMOL/L (ref 0.5–2)
LACTATE SERPL-SCNC: 2.5 MMOL/L (ref 0.5–2)
LYMPHOCYTES # BLD AUTO: 0.89 THOUSAND/UL (ref 0.6–4.47)
LYMPHOCYTES # BLD AUTO: 5 % (ref 14–44)
MCH RBC QN AUTO: 29.2 PG (ref 26.8–34.3)
MCHC RBC AUTO-ENTMCNC: 31.2 G/DL (ref 31.4–37.4)
MCV RBC AUTO: 94 FL (ref 82–98)
MONOCYTES # BLD AUTO: 0.71 THOUSAND/UL (ref 0–1.22)
MONOCYTES NFR BLD: 4 % (ref 4–12)
MYELOCYTES NFR BLD MANUAL: 1 % (ref 0–1)
NEUTROPHILS # BLD MANUAL: 15.48 THOUSAND/UL (ref 1.85–7.62)
NEUTS BAND NFR BLD MANUAL: 8 % (ref 0–8)
NEUTS SEG NFR BLD AUTO: 79 % (ref 43–75)
NRBC BLD AUTO-RTO: 0 /100 WBCS
PLATELET # BLD AUTO: 633 THOUSANDS/UL (ref 149–390)
PLATELET BLD QL SMEAR: ABNORMAL
PMV BLD AUTO: 10.1 FL (ref 8.9–12.7)
POIKILOCYTOSIS BLD QL SMEAR: PRESENT
POLYCHROMASIA BLD QL SMEAR: PRESENT
POTASSIUM SERPL-SCNC: 4.7 MMOL/L (ref 3.5–5.3)
PROMYELOCYTES NFR BLD MANUAL: 1 % (ref 0–0)
PROT SERPL-MCNC: 6.5 G/DL (ref 6.4–8.2)
PROTHROMBIN TIME: 15.6 SECONDS (ref 11.8–14.2)
RBC # BLD AUTO: 2.64 MILLION/UL (ref 3.81–5.12)
RBC MORPH BLD: PRESENT
RH BLD: POSITIVE
SODIUM SERPL-SCNC: 134 MMOL/L (ref 136–145)
SPECIMEN EXPIRATION DATE: NORMAL
TSH SERPL DL<=0.05 MIU/L-ACNC: 3.16 UIU/ML (ref 0.36–3.74)
UNIT DISPENSE STATUS: NORMAL
UNIT PRODUCT CODE: NORMAL
UNIT RH: NORMAL
WBC # BLD AUTO: 17.79 THOUSAND/UL (ref 4.31–10.16)

## 2018-08-02 PROCEDURE — 82272 OCCULT BLD FECES 1-3 TESTS: CPT

## 2018-08-02 PROCEDURE — 84443 ASSAY THYROID STIM HORMONE: CPT | Performed by: INTERNAL MEDICINE

## 2018-08-02 PROCEDURE — 96376 TX/PRO/DX INJ SAME DRUG ADON: CPT

## 2018-08-02 PROCEDURE — 85610 PROTHROMBIN TIME: CPT | Performed by: EMERGENCY MEDICINE

## 2018-08-02 PROCEDURE — 85018 HEMOGLOBIN: CPT | Performed by: INTERNAL MEDICINE

## 2018-08-02 PROCEDURE — 87081 CULTURE SCREEN ONLY: CPT | Performed by: INTERNAL MEDICINE

## 2018-08-02 PROCEDURE — 82140 ASSAY OF AMMONIA: CPT | Performed by: EMERGENCY MEDICINE

## 2018-08-02 PROCEDURE — 82948 REAGENT STRIP/BLOOD GLUCOSE: CPT

## 2018-08-02 PROCEDURE — 99285 EMERGENCY DEPT VISIT HI MDM: CPT

## 2018-08-02 PROCEDURE — 36415 COLL VENOUS BLD VENIPUNCTURE: CPT | Performed by: EMERGENCY MEDICINE

## 2018-08-02 PROCEDURE — 99283 EMERGENCY DEPT VISIT LOW MDM: CPT

## 2018-08-02 PROCEDURE — 83605 ASSAY OF LACTIC ACID: CPT | Performed by: EMERGENCY MEDICINE

## 2018-08-02 PROCEDURE — 85027 COMPLETE CBC AUTOMATED: CPT | Performed by: EMERGENCY MEDICINE

## 2018-08-02 PROCEDURE — 99223 1ST HOSP IP/OBS HIGH 75: CPT | Performed by: INTERNAL MEDICINE

## 2018-08-02 PROCEDURE — 80053 COMPREHEN METABOLIC PANEL: CPT | Performed by: EMERGENCY MEDICINE

## 2018-08-02 PROCEDURE — 86900 BLOOD TYPING SEROLOGIC ABO: CPT | Performed by: EMERGENCY MEDICINE

## 2018-08-02 PROCEDURE — 83605 ASSAY OF LACTIC ACID: CPT | Performed by: INTERNAL MEDICINE

## 2018-08-02 PROCEDURE — 83036 HEMOGLOBIN GLYCOSYLATED A1C: CPT | Performed by: INTERNAL MEDICINE

## 2018-08-02 PROCEDURE — 86850 RBC ANTIBODY SCREEN: CPT | Performed by: EMERGENCY MEDICINE

## 2018-08-02 PROCEDURE — 74177 CT ABD & PELVIS W/CONTRAST: CPT

## 2018-08-02 PROCEDURE — 71046 X-RAY EXAM CHEST 2 VIEWS: CPT

## 2018-08-02 PROCEDURE — C9113 INJ PANTOPRAZOLE SODIUM, VIA: HCPCS | Performed by: INTERNAL MEDICINE

## 2018-08-02 PROCEDURE — 85730 THROMBOPLASTIN TIME PARTIAL: CPT | Performed by: EMERGENCY MEDICINE

## 2018-08-02 PROCEDURE — 96360 HYDRATION IV INFUSION INIT: CPT

## 2018-08-02 PROCEDURE — 85007 BL SMEAR W/DIFF WBC COUNT: CPT | Performed by: EMERGENCY MEDICINE

## 2018-08-02 PROCEDURE — 86901 BLOOD TYPING SEROLOGIC RH(D): CPT | Performed by: EMERGENCY MEDICINE

## 2018-08-02 PROCEDURE — 86923 COMPATIBILITY TEST ELECTRIC: CPT

## 2018-08-02 RX ORDER — POLYETHYLENE GLYCOL 3350 17 G/17G
17 POWDER, FOR SOLUTION ORAL DAILY
Status: DISCONTINUED | OUTPATIENT
Start: 2018-08-03 | End: 2018-08-10 | Stop reason: HOSPADM

## 2018-08-02 RX ORDER — ONDANSETRON 2 MG/ML
4 INJECTION INTRAMUSCULAR; INTRAVENOUS EVERY 6 HOURS PRN
Status: DISCONTINUED | OUTPATIENT
Start: 2018-08-02 | End: 2018-08-10 | Stop reason: HOSPADM

## 2018-08-02 RX ORDER — PRAVASTATIN SODIUM 40 MG
40 TABLET ORAL
Status: DISCONTINUED | OUTPATIENT
Start: 2018-08-03 | End: 2018-08-10 | Stop reason: HOSPADM

## 2018-08-02 RX ORDER — PREDNISOLONE ACETATE 10 MG/ML
1 SUSPENSION/ DROPS OPHTHALMIC 2 TIMES DAILY
Status: DISCONTINUED | OUTPATIENT
Start: 2018-08-02 | End: 2018-08-10 | Stop reason: HOSPADM

## 2018-08-02 RX ORDER — MELATONIN
2000 DAILY
Status: DISCONTINUED | OUTPATIENT
Start: 2018-08-03 | End: 2018-08-10 | Stop reason: HOSPADM

## 2018-08-02 RX ORDER — DORZOLAMIDE HYDROCHLORIDE AND TIMOLOL MALEATE 20; 5 MG/ML; MG/ML
1 SOLUTION/ DROPS OPHTHALMIC 2 TIMES DAILY
Status: DISCONTINUED | OUTPATIENT
Start: 2018-08-02 | End: 2018-08-10 | Stop reason: HOSPADM

## 2018-08-02 RX ORDER — NYSTATIN 100000 [USP'U]/G
POWDER TOPICAL 2 TIMES DAILY
Status: DISCONTINUED | OUTPATIENT
Start: 2018-08-02 | End: 2018-08-10 | Stop reason: HOSPADM

## 2018-08-02 RX ORDER — INSULIN GLARGINE 100 [IU]/ML
6 INJECTION, SOLUTION SUBCUTANEOUS
Status: DISCONTINUED | OUTPATIENT
Start: 2018-08-02 | End: 2018-08-03

## 2018-08-02 RX ORDER — SODIUM CHLORIDE 9 MG/ML
75 INJECTION, SOLUTION INTRAVENOUS CONTINUOUS
Status: DISCONTINUED | OUTPATIENT
Start: 2018-08-02 | End: 2018-08-05

## 2018-08-02 RX ORDER — DOCUSATE SODIUM 100 MG/1
100 CAPSULE, LIQUID FILLED ORAL 2 TIMES DAILY
Status: DISCONTINUED | OUTPATIENT
Start: 2018-08-02 | End: 2018-08-10 | Stop reason: HOSPADM

## 2018-08-02 RX ORDER — HYDROCODONE BITARTRATE AND ACETAMINOPHEN 5; 325 MG/1; MG/1
1 TABLET ORAL 2 TIMES DAILY PRN
Status: DISCONTINUED | OUTPATIENT
Start: 2018-08-02 | End: 2018-08-07

## 2018-08-02 RX ADMIN — METOPROLOL TARTRATE 25 MG: 25 TABLET ORAL at 22:17

## 2018-08-02 RX ADMIN — SODIUM CHLORIDE 8 MG/HR: 9 INJECTION, SOLUTION INTRAVENOUS at 23:22

## 2018-08-02 RX ADMIN — PREDNISOLONE ACETATE 1 DROP: 10 SUSPENSION/ DROPS OPHTHALMIC at 22:45

## 2018-08-02 RX ADMIN — SODIUM CHLORIDE 80 MG: 9 INJECTION, SOLUTION INTRAVENOUS at 22:53

## 2018-08-02 RX ADMIN — BIMATOPROST 1 DROP: 0.1 SOLUTION/ DROPS OPHTHALMIC at 22:59

## 2018-08-02 RX ADMIN — DOCUSATE SODIUM 100 MG: 100 CAPSULE, LIQUID FILLED ORAL at 22:17

## 2018-08-02 RX ADMIN — INSULIN GLARGINE 6 UNITS: 100 INJECTION, SOLUTION SUBCUTANEOUS at 22:18

## 2018-08-02 RX ADMIN — NYSTATIN: 100000 POWDER TOPICAL at 22:45

## 2018-08-02 RX ADMIN — IODIXANOL 75 ML: 320 INJECTION, SOLUTION INTRAVASCULAR at 19:29

## 2018-08-02 RX ADMIN — SODIUM CHLORIDE 75 ML/HR: 0.9 INJECTION, SOLUTION INTRAVENOUS at 21:45

## 2018-08-02 RX ADMIN — DORZOLAMIDE HYDROCHLORIDE AND TIMOLOL MALEATE 1 DROP: 20; 5 SOLUTION/ DROPS OPHTHALMIC at 22:45

## 2018-08-02 RX ADMIN — SODIUM CHLORIDE 1000 ML: 0.9 INJECTION, SOLUTION INTRAVENOUS at 19:10

## 2018-08-02 NOTE — ED ATTENDING ATTESTATION
I, Zeynep Bull DO, saw and evaluated the patient  I have discussed the patient with the resident/non-physician practitioner and agree with the resident's/non-physician practitioner's findings, Plan of Care, and MDM as documented in the resident's/non-physician practitioner's note, except where noted  All available labs and Radiology studies were reviewed  At this point I agree with the current assessment done in the Emergency Department  I have conducted an independent evaluation of this patient a history and physical is as follows:      Critical Care Time  CritCare Time    Procedures     81 yr fem sent to the ED for low Hgb  Seen [facility] at [facility] for incr in sugar and anemia  Transfused  During recent admission it was suspected that blood loss was from decubitus  Pt wo abd pain, nausea, melena  ? Accuracy of pts answers  Exm; morbid obese abd, soft and nontender with grossly guiac + black stool  Pln: adm for GI bleed

## 2018-08-02 NOTE — DISCHARGE INSTRUCTIONS
Anemia   WHAT YOU NEED TO KNOW:   Anemia is a low number of red blood cells or a low amount of hemoglobin in your red blood cells  Hemoglobin is a protein that helps carry oxygen throughout your body  Red blood cells use iron to create hemoglobin  Anemia may develop if your body does not have enough iron  It may also develop if your body does not make enough red blood cells or they die faster than your body can make them  DISCHARGE INSTRUCTIONS:   Call 911 or have someone call 911 for any of the following:   · You lose consciousness  · You have severe chest pain  Return to the emergency department if:   · You have dark or bloody bowel movements  Contact your healthcare provider if:   · Your symptoms are worse, even after treatment  · You have questions or concerns about your condition or care  Medicines:   · Iron or folic acid supplements  help increase your red blood cell and hemoglobin levels  · Vitamin B12 injections  may help boost your red blood cell level and decrease your symptoms  Ask your healthcare provider how to inject B12  · Take your medicine as directed  Contact your healthcare provider if you think your medicine is not helping or if you have side effects  Tell him of her if you are allergic to any medicine  Keep a list of the medicines, vitamins, and herbs you take  Include the amounts, and when and why you take them  Bring the list or the pill bottles to follow-up visits  Carry your medicine list with you in case of an emergency  Prevent anemia:  Eat healthy foods rich in iron and vitamin C  Nuts, meat, dark leafy green vegetables, and beans are high in iron and protein  Vitamin C helps your body absorb iron  Foods rich in vitamin C include oranges and other citrus fruits  Ask your healthcare provider for a list of other foods that are high in iron or vitamin C  Ask if you need to be on a special diet     Follow up with your healthcare provider as directed:  Write down your questions so you remember to ask them during your visits  © 2017 [facility] Information is for End User's use only and may not be sold, redistributed or otherwise used for commercial purposes  All illustrations and images included in CareNotes® are the copyrighted property of [employer]  or [employer]  The above information is an  only  It is not intended as medical advice for individual conditions or treatments  Talk to your doctor, nurse or pharmacist before following any medical regimen to see if it is safe and effective for you  Diabetic Hyperglycemia   WHAT YOU NEED TO KNOW:   Diabetic hyperglycemia is a blood glucose (sugar) level that is higher than your healthcare provider recommends  You may have increased thirst and urinate more often than usual  Over time, uncontrolled diabetes can damage your nerves, blood vessels, tissues, and organs  That is why it is important to manage diabetic hyperglycemia  Without treatment, diabetic hyperglycemia can lead to diabetic ketoacidosis (DKA) or hyperglycemic hyperosmolar state (HHS)  These are serious conditions that can become life-threatening  DISCHARGE INSTRUCTIONS:   Return to the emergency department if:   · You have shortness of breath  · Your breath smells fruity  · You have nausea and vomiting  · You have symptoms of dehydration, such as dark yellow urine, dry mouth and lips, and dry skin  Contact your healthcare provider if:   · You continue to have higher blood sugar levels than your healthcare provider recommends  · Your blood sugar level is over 240 mg/dl and  you have ketones in your urine  · You have questions or concerns about your condition or care  Medicines:   · Medicines  such as insulin and hypoglycemic medicine decrease blood sugar levels  · Take your medicine as directed    Contact your healthcare provider if you think your medicine is not helping or if you have side effects  Tell him or her if you are allergic to any medicine  Keep a list of the medicines, vitamins, and herbs you take  Include the amounts, and when and why you take them  Bring the list or the pill bottles to follow-up visits  Carry your medicine list with you in case of an emergency  Follow up with your healthcare provider or specialist as directed: Your healthcare provider may refer you to a dietitian or diabetes specialist  Write down your questions so you remember to ask them during your visits  Manage diabetic hyperglycemia:   · If you take diabetes medicine or insulin, take it as directed  Missed or wrong doses can cause your blood sugar to go up  · Tell your healthcare provider if you continue to have trouble managing your blood sugar  He may change the type, amount, or timing of your diabetes medicine or insulin  If you do not take diabetes medicine or insulin, you may need to start  · Work with your healthcare provider to develop a sick day plan  Illness can cause your blood sugar to rise  A sick day plan helps you control your blood sugar level when you are sick  Prevent diabetic hyperglycemia:   · Check your blood sugar levels regularly  Ask your healthcare provider how often to check your blood sugar and what your levels should be  · Follow your meal plan  Your blood sugar can go up if you eat a large meal or you eat more carbohydrates than recommended  Work with a dietitian to develop a meal plan that is right for you  · Exercise regularly  to help lower your blood sugar when it is high  It can also keep your blood sugar levels steady over time  Exercise for at least 30 minutes, 5 days a week  Include muscle strengthening activities 2 days each week  Do not sit for longer than 90 minutes at a time  Work with your healthcare provider to create an exercise plan  Children should get at least 60 minutes of physical activity each day      · Check your ketones before exercise  if your blood sugar level is above 240 mg/dl  Do not exercise if you have ketones in your urine,  because your blood sugar level may rise even more  Ask your healthcare provider how to lower your blood sugar when you have ketones  © 2017 [address] Information is for End User's use only and may not be sold, redistributed or otherwise used for commercial purposes  All illustrations and images included in CareNotes® are the copyrighted property of [employer]  or [employer]  The above information is an  only  It is not intended as medical advice for individual conditions or treatments  Talk to your doctor, nurse or pharmacist before following any medical regimen to see if it is safe and effective for you

## 2018-08-02 NOTE — ED PROVIDER NOTES
History  Chief Complaint   Patient presents with    Hyperglycemia - no symptoms     Per EMS pt at dinner around 1500 and [facility] glucometer read "high" which indicates above 600  Pt was given 13 units of novolog by nursing home staff  Pts glucose for EMS was 451  Pt c/o being a little more thirsty than usual       81-year-old female comes in for hyperglycemia  Patient was at the nursing home and they checked her blood sugar and it read above 600  Patient was given 13 in its and NovoLog 1 as recheck to was 451  Patient's only complaint is of being more thirsty  Patient denies any nausea vomiting fever chills chest pain shortness of breath  Patient has a history of chronic kidney disease and anemia as well as diabetes and hypertension  History provided by:  Patient and nursing home   used: No    Hyperglycemia - Symptomatic   Blood sugar level PTA:  600, 451  Severity:  Severe  Onset quality:  Sudden  Duration:  1 hour  Timing:  Constant  Progression:  Improving  Chronicity:  New  Diabetes status:  Controlled with insulin  Current diabetic therapy:  See meds list  Time since last antidiabetic medication:  4 hours  Context: not new diabetes diagnosis and not recent illness    Ineffective treatments:  Insulin  Associated symptoms: no abdominal pain, no chest pain, no fatigue, no fever, no increased thirst, no shortness of breath and no weight change    Risk factors: obesity    Risk factors: no pregnancy and no recent steroid use        Prior to Admission Medications   Prescriptions Last Dose Informant Patient Reported? Taking?    CHOLECALCIFEROL PO   Yes No   Sig: Take 1 tablet by mouth daily   HYDROcodone-acetaminophen (XODOL) 5-300 MG per tablet   No No   Sig: Take 0 5 tablets by mouth 2 (two) times a day as needed for moderate pain Max Daily Amount: 1 tablet   Linagliptin (TRADJENTA) 5 MG TABS   Yes No   Sig: Take 5 mg by mouth daily   aspirin (ECOTRIN LOW STRENGTH) 81 mg EC tablet   Yes No   Sig: Take 81 mg by mouth daily   bimatoprost (LUMIGAN) 0 01 % ophthalmic drops   Yes No   Sig: Administer 1 drop to both eyes daily at bedtime   brimonidine-timolol (COMBIGAN) 0 2-0 5 %   Yes No   Sig: Administer 1 drop to the right eye every 12 (twelve) hours   docusate sodium (COLACE) 100 mg capsule   No No   Sig: Take 1 capsule (100 mg total) by mouth 2 (two) times a day   insulin glargine (LANTUS) 100 units/mL subcutaneous injection   No No   Sig: Inject 12 Units under the skin daily at bedtime   insulin lispro (HumaLOG) 100 units/mL injection   No No   Sig: Inject 1-5 Units under the skin 3 (three) times a day before meals   insulin lispro (HumaLOG) 100 units/mL injection   No No   Sig: Inject 5 Units under the skin 3 (three) times a day with meals   levothyroxine 75 mcg tablet   Yes No   Sig: Take 75 mcg by mouth daily   metoprolol tartrate (LOPRESSOR) 50 mg tablet   Yes No   Sig: Take 25 mg by mouth every 12 (twelve) hours   nystatin (MYCOSTATIN) powder   No No   Sig: Apply topically 2 (two) times a day   polyethylene glycol (MIRALAX) 17 g packet   No No   Sig: Take 17 g by mouth daily   pravastatin (PRAVACHOL) 40 mg tablet   Yes No   Sig: Take 40 mg by mouth daily   prednisoLONE acetate (PRED FORTE) 1 % ophthalmic suspension   No No   Sig: Administer 1 drop to both eyes 2 (two) times a day      Facility-Administered Medications: None       Past Medical History:   Diagnosis Date    Anemia     Chronic kidney disease     Diabetes mellitus (HCC)     Type 2, blood suar checked in gi admission 257, anesthesia aware    Disease of thyroid gland     GI bleed     History of transfusion     Hyperlipidemia     Hypertension     Kidney stone     Sacral decubitus ulcer        Past Surgical History:   Procedure Laterality Date    CATARACT EXTRACTION       SECTION      CHOLECYSTECTOMY      ESOPHAGOGASTRODUODENOSCOPY N/A 8/3/2018    Procedure: ESOPHAGOGASTRODUODENOSCOPY (EGD);   Surgeon: Peña Carcamo MD;  Location: [facility]; Service: Gastroenterology       History reviewed  No pertinent family history  I have reviewed and agree with the history as documented  Social History   Substance Use Topics    Smoking status: Never Smoker    Smokeless tobacco: Never Used    Alcohol use No        Review of Systems   Constitutional: Negative for fatigue and fever  HENT: Negative for congestion and ear pain  Eyes: Negative for discharge and redness  Respiratory: Negative for apnea, cough, shortness of breath and wheezing  Cardiovascular: Negative for chest pain  Gastrointestinal: Negative for abdominal pain and diarrhea  Endocrine: Negative for cold intolerance and polydipsia  Genitourinary: Negative for difficulty urinating and hematuria  Musculoskeletal: Negative for arthralgias and back pain  Skin: Negative for color change and rash  Allergic/Immunologic: Negative for environmental allergies and immunocompromised state  Neurological: Negative for numbness and headaches  Hematological: Negative for adenopathy  Does not bruise/bleed easily  Psychiatric/Behavioral: Negative for agitation and behavioral problems  Physical Exam  Physical Exam   Constitutional: She is oriented to person, place, and time  Vital signs are normal  She appears well-developed and well-nourished  Non-toxic appearance  HENT:   Head: Normocephalic and atraumatic  Right Ear: Tympanic membrane and external ear normal    Left Ear: Tympanic membrane and external ear normal    Nose: Nose normal  No rhinorrhea, sinus tenderness or nasal deformity  Mouth/Throat: Uvula is midline and oropharynx is clear and moist  Normal dentition  Eyes: Conjunctivae, EOM and lids are normal  Pupils are equal, round, and reactive to light  Right eye exhibits no discharge  Left eye exhibits no discharge  Neck: Trachea normal and normal range of motion  Neck supple  No JVD present  Carotid bruit is not present  Cardiovascular: Normal rate, regular rhythm, intact distal pulses and normal pulses  No extrasystoles are present  PMI is not displaced  Pulmonary/Chest: Effort normal and breath sounds normal  No accessory muscle usage  No respiratory distress  She has no wheezes  She has no rhonchi  She has no rales  Abdominal: Soft  Normal appearance and bowel sounds are normal  She exhibits no mass  There is no tenderness  There is no rigidity, no rebound and no guarding  Musculoskeletal:        Right shoulder: She exhibits normal range of motion, no bony tenderness, no swelling and no deformity  Cervical back: Normal  She exhibits normal range of motion, no tenderness, no bony tenderness and no deformity  Lymphadenopathy:     She has no cervical adenopathy  She has no axillary adenopathy  Neurological: She is alert and oriented to person, place, and time  She has normal strength and normal reflexes  No cranial nerve deficit or sensory deficit  GCS eye subscore is 4  GCS verbal subscore is 5  GCS motor subscore is 6  Skin: Skin is warm and dry  No rash noted  Psychiatric: She has a normal mood and affect  Her speech is normal and behavior is normal    Nursing note and vitals reviewed        Vital Signs  ED Triage Vitals [08/01/18 2106]   Temperature Pulse Respirations Blood Pressure SpO2   99 1 °F (37 3 °C) (!) 117 20 151/67 98 %      Temp Source Heart Rate Source Patient Position - Orthostatic VS BP Location FiO2 (%)   Oral Monitor Lying Right arm --      Pain Score       No Pain           Vitals:    08/02/18 0000 08/02/18 0015 08/02/18 0030 08/02/18 0100   BP: 120/58 119/57 122/58 127/60   Pulse: 104 100 102 98   Patient Position - Orthostatic VS: Lying Lying Lying Lying       Visual Acuity      ED Medications  Medications   sodium chloride 0 9 % bolus 1,000 mL (0 mL Intravenous Stopped 8/1/18 0200)   insulin regular (HumuLIN R,NovoLIN R) injection 15 Units (15 Units Intravenous Given 8/1/18 2214) insulin glargine (LANTUS) subcutaneous injection 12 Units 0 12 mL (12 Units Subcutaneous Given 8/1/18 2345)   insulin regular (HumuLIN R,NovoLIN R) injection 15 Units (15 Units Intravenous Given 8/2/18 0110)       Diagnostic Studies  Results Reviewed     Procedure Component Value Units Date/Time    Fingerstick Glucose (POCT) [[id number]]  (Abnormal) Collected:  08/02/18 0144    Lab Status:  Final result Updated:  08/02/18 0148     POC Glucose 325 (H) mg/dl     Fingerstick Glucose (POCT) [[id number]]  (Abnormal) Collected:  08/02/18 0026    Lab Status:  Final result Updated:  08/02/18 0027     POC Glucose 372 (H) mg/dl     Fingerstick Glucose (POCT) [[id number]]  (Abnormal) Collected:  08/01/18 2331    Lab Status:  Final result Updated:  08/01/18 2332     POC Glucose 432 (H) mg/dl     Fingerstick Glucose (POCT) [[id number]]  (Abnormal) Collected:  08/01/18 2214    Lab Status:  Final result Updated:  08/01/18 2215     POC Glucose 459 (H) mg/dl     CBC and differential [[id number]]  (Abnormal) Collected:  08/01/18 2125    Lab Status:  Final result Specimen:  Blood from Arm, Right Updated:  08/01/18 2206     WBC 17 95 (H) Thousand/uL      RBC 2 62 (L) Million/uL      Hemoglobin 7 7 (L) g/dL      Hematocrit 24 0 (L) %      MCV 92 fL      MCH 29 4 pg      MCHC 32 1 g/dL      RDW 13 8 %      MPV 9 6 fL      Platelets 615 (H) Thousands/uL      nRBC 0 /100 WBCs     Narrative: This is an appended report  These results have been appended to a previously verified report      Comprehensive metabolic panel [[id number]]  (Abnormal) Collected:  08/01/18 2125    Lab Status:  Final result Specimen:  Blood from Arm, Right Updated:  08/01/18 2149     Sodium 129 (L) mmol/L      Potassium 4 5 mmol/L      Chloride 97 (L) mmol/L      CO2 24 mmol/L      Anion Gap 8 mmol/L      BUN 63 (H) mg/dL      Creatinine 1 84 (H) mg/dL      Glucose 438 (H) mg/dL      Calcium 9 0 mg/dL      AST 22 U/L      ALT 39 U/L      Alkaline Phosphatase 127 (H) U/L Total Protein 6 9 g/dL      Albumin 1 9 (L) g/dL      Total Bilirubin 0 20 mg/dL      eGFR 25 ml/min/1 73sq m     Narrative:         National Kidney Disease Education Program recommendations are as follows:  GFR calculation is accurate only with a steady state creatinine  Chronic Kidney disease less than 60 ml/min/1 73 sq  meters  Kidney failure less than 15 ml/min/1 73 sq  meters  Acetone [[id number]]  (Normal) Collected:  08/01/18 2125    Lab Status:  Final result Specimen:  Blood from Arm, Right Updated:  08/01/18 2145     Acetone, Bld Negative    Fingerstick Glucose (POCT) [[id number]]  (Abnormal) Collected:  08/01/18 2106    Lab Status:  Final result Updated:  08/01/18 2107     POC Glucose 491 (H) mg/dl                  No orders to display              Procedures  Procedures       Phone Contacts  ED Phone Contact    ED Course                               MDM  Number of Diagnoses or Management Options  Anemia, unspecified type: established and worsening  Hyperglycemia: new and requires workup     Amount and/or Complexity of Data Reviewed  Clinical lab tests: ordered and reviewed  Tests in the medicine section of CPT®: reviewed and ordered  Review and summarize past medical records: yes    Patient Progress  Patient progress: stable    CritCare Time    Disposition  Final diagnoses:   Hyperglycemia   Anemia, unspecified type     Time reflects when diagnosis was documented in both MDM as applicable and the Disposition within this note     Time User Action Codes Description Comment    8/2/2018 12:27 AM Angie DELAROSA Add [R73 9] Hyperglycemia     8/2/2018 12:27 AM David Romero Add [D64 9] Anemia, unspecified type       ED Disposition     ED Disposition Condition Comment    Discharge  Addy Ho discharge to home/self care      Condition at discharge: Good        Follow-up Information     Follow up With Specialties Details Why 73 Sasha Hernandez MD Internal Medicine  As needed [address]  [place] [address]  [phone]            Discharge Medication List as of 8/2/2018 12:28 AM      CONTINUE these medications which have NOT CHANGED    Details   aspirin (ECOTRIN LOW STRENGTH) 81 mg EC tablet Take 81 mg by mouth daily, Historical Med      bimatoprost (LUMIGAN) 0 01 % ophthalmic drops Administer 1 drop to both eyes daily at bedtime, Historical Med      brimonidine-timolol (COMBIGAN) 0 2-0 5 % Administer 1 drop to the right eye every 12 (twelve) hours, Historical Med      CHOLECALCIFEROL PO Take 1 tablet by mouth daily, Historical Med      docusate sodium (COLACE) 100 mg capsule Take 1 capsule (100 mg total) by mouth 2 (two) times a day, Starting Fri 7/27/2018, No Print      HYDROcodone-acetaminophen (XODOL) 5-300 MG per tablet Take 0 5 tablets by mouth 2 (two) times a day as needed for moderate pain Max Daily Amount: 1 tablet, Starting Fri 7/27/2018, Print      insulin glargine (LANTUS) 100 units/mL subcutaneous injection Inject 12 Units under the skin daily at bedtime, Starting Fri 7/27/2018, No Print      !! insulin lispro (HumaLOG) 100 units/mL injection Inject 1-5 Units under the skin 3 (three) times a day before meals, Starting Fri 7/27/2018, No Print      !! insulin lispro (HumaLOG) 100 units/mL injection Inject 5 Units under the skin 3 (three) times a day with meals, Starting Fri 7/27/2018, No Print      levothyroxine 75 mcg tablet Take 75 mcg by mouth daily, Historical Med      Linagliptin (TRADJENTA) 5 MG TABS Take 5 mg by mouth daily, Historical Med      metoprolol tartrate (LOPRESSOR) 50 mg tablet Take 25 mg by mouth every 12 (twelve) hours, Historical Med      nystatin (MYCOSTATIN) powder Apply topically 2 (two) times a day, Starting Fri 7/27/2018, No Print      polyethylene glycol (MIRALAX) 17 g packet Take 17 g by mouth daily, Starting Sat 7/28/2018, No Print      pravastatin (PRAVACHOL) 40 mg tablet Take 40 mg by mouth daily, Historical Med      prednisoLONE acetate (PRED FORTE) 1 % ophthalmic suspension Administer 1 drop to both eyes 2 (two) times a day, Starting Fri 7/27/2018, No Print       !! - Potential duplicate medications found  Please discuss with provider  No discharge procedures on file      ED Provider  Electronically Signed by           Yan Ray DO  08/04/18 5247

## 2018-08-02 NOTE — ED PROVIDER NOTES
After Visit Summary   6/18/2018    Yefri Ruiz    MRN: [id number]           Patient Information     Date Of Birth          2010        Visit Information        Provider Department      6/18/2018 10:45 AM Karla Sarabia MD [facility]        Today's Diagnoses     Iron deficiency           Follow-ups after your visit        Your next 10 appointments already scheduled     Jul 18, 2018  1:00 PM CDT   PEDS ANDREW EVAL with Tara Chavez PT   [facility] ([facility])    [address] [facility] [id number] [phone]              Who to contact     If you have questions or need follow up information about today's clinic visit or your schedule please contact [facility] directly at [phone].  Normal or non-critical lab and imaging results will be communicated to you by MyChart, letter or phone within 4 business days after the clinic has received the results. If you do not hear from us within 7 days, please contact the clinic through MyChart or phone. If you have a critical or abnormal lab result, we will notify you by phone as soon as possible.  Submit refill requests through [employer] or call your pharmacy and they will forward the refill request to us. Please allow 3 business days for your refill to be completed.          Additional Information About Your Visit        MyChart Information     [employer] gives you secure access to your electronic health record. If you see a primary care provider, you can also send messages to your care team and make appointments. If you have questions, please call your primary care clinic.  If you do not have a primary care provider, please call [phone] and they will assist you.        Care EveryWhere ID     This is your Care EveryWhere ID. This could be used by other organizations to access your [facility] medical records  [id number]         Blood  History  Chief Complaint   Patient presents with    Abnormal Lab     pt was told she had to come in for abnormal H and H labs  facility is suspicious of GI bleed  82 yo F sent to ED from [facility] for low hgb  Pt was admitted at [address] [place] [address] 7/20-7/27 for anemia thought to be due to bleeding from sacral decubitus ulcer/L buttock wound  Hgb stabilized, and was 9 2 on discharge  She was seen again at [facility] ED yesterday for hyperglycemia, was noted to have hgb 7 7  She was treated for hyperglycemia and transfused 1 unit pRBCs and discharged back to nursing facility  Today [facility] noted her hgb to be low (unsure value), and she as sent here to [facility] for evaluation  Pt has no complaints  History is unreliable  Uncertain if she has baseline history of dementia  She is able to say she has PMH of DM but otherwise does not seem to be aware of recent medical events  She does not know why she is in the ED  She says she isn't sure if she was in the hospital recently or why  She denies any abdominal pain, nausea/vomiting, fever/chills, diarrhea, constipation  Pt denies any change in BM recently, but she says she does not remember when she last had BM either  Denies any bleeding from anywhere  Noted to have occasional dry cough in the room, and when asked about it, pt says it has been there for maybe a week  Denies chest pain or shortness of breath  Prior to Admission Medications   Prescriptions Last Dose Informant Patient Reported? Taking?    CHOLECALCIFEROL PO   Yes Yes   Sig: Take 1 tablet by mouth daily   HYDROcodone-acetaminophen (XODOL) 5-300 MG per tablet   No Yes   Sig: Take 0 5 tablets by mouth 2 (two) times a day as needed for moderate pain Max Daily Amount: 1 tablet   Linagliptin (TRADJENTA) 5 MG TABS   Yes Yes   Sig: Take 5 mg by mouth daily   aspirin (ECOTRIN LOW STRENGTH) 81 mg EC tablet   Yes Yes   Sig: Take 81 mg by mouth daily   bimatoprost (LUMIGAN) 0 01 % ophthalmic drops Pressure from Last 3 Encounters:   03/22/18 98/58   03/16/18 102/67   01/11/18 95/69    Weight from Last 3 Encounters:   03/22/18 52 lb 6.4 oz (23.8 kg) (65 %)*   03/16/18 53 lb 6 oz (24.2 kg) (69 %)*   01/11/18 48 lb 4 oz (21.9 kg) (49 %)*     * Growth percentiles are based on [facility] 2-20 Years data.              We Performed the Following     Ferritin          Today's Medication Changes          These changes are accurate as of 6/18/18 11:59 PM.  If you have any questions, ask your nurse or doctor.               Start taking these medicines.        Dose/Directions    Pulse Oximeter Misc   Used for:  Iron deficiency   Started by:  Karla Sarabia MD        Dose:  1 Units   1 Units as needed Portable pulse oximeter   Quantity:  1 each   Refills:  0            Where to get your medicines      Some of these will need a paper prescription and others can be bought over the counter.  Ask your nurse if you have questions.     Bring a paper prescription for each of these medications     Pulse Oximeter Misc                Primary Care Provider Office Phone # Fax #    Karla Sarabia [phone]       [address]  [place] [address]        Equal Access to Services     MABEL FAUSTIN AH: Hadii tonie ku hadasho Soomaali, waaxda luqadaha, qaybta kaalmada adeegyada, waxay zeeshan haycelestine dyer. So [facility] [phone].    ATENCIÓN: Si habla español, tiene a patel disposición servicios gratuitos de asistencia lingüística. Llame al [phone].    We comply with applicable federal civil rights laws and [place] laws. We do not discriminate on the basis of race, color, national origin, age, disability, sex, sexual orientation, or gender identity.            Thank you!     Thank you for choosing [facility]  for your care. Our goal is always to provide you with excellent care. Hearing back from our patients is one way we can continue to improve our services.  Yes Yes   Sig: Administer 1 drop to both eyes daily at bedtime   brimonidine-timolol (COMBIGAN) 0 2-0 5 %   Yes Yes   Sig: Administer 1 drop to the right eye every 12 (twelve) hours   docusate sodium (COLACE) 100 mg capsule   No Yes   Sig: Take 1 capsule (100 mg total) by mouth 2 (two) times a day   insulin glargine (LANTUS) 100 units/mL subcutaneous injection   No Yes   Sig: Inject 12 Units under the skin daily at bedtime   insulin lispro (HumaLOG) 100 units/mL injection   No Yes   Sig: Inject 1-5 Units under the skin 3 (three) times a day before meals   insulin lispro (HumaLOG) 100 units/mL injection   No Yes   Sig: Inject 5 Units under the skin 3 (three) times a day with meals   levothyroxine 75 mcg tablet   Yes Yes   Sig: Take 75 mcg by mouth daily   metoprolol tartrate (LOPRESSOR) 50 mg tablet   Yes Yes   Sig: Take 25 mg by mouth every 12 (twelve) hours   nystatin (MYCOSTATIN) powder   No Yes   Sig: Apply topically 2 (two) times a day   polyethylene glycol (MIRALAX) 17 g packet   No Yes   Sig: Take 17 g by mouth daily   pravastatin (PRAVACHOL) 40 mg tablet   Yes Yes   Sig: Take 40 mg by mouth daily   prednisoLONE acetate (PRED FORTE) 1 % ophthalmic suspension   No Yes   Sig: Administer 1 drop to both eyes 2 (two) times a day      Facility-Administered Medications: None       Past Medical History:   Diagnosis Date    Diabetes mellitus ([facility] [address] )     Type 2    Hyperlipidemia     Hypertension        History reviewed  No pertinent surgical history  History reviewed  No pertinent family history  I have reviewed and agree with the history as documented      Social History   Substance Use Topics    Smoking status: Never Smoker    Smokeless tobacco: Never Used    Alcohol use No        Review of Systems   Unable to perform ROS: Dementia       Physical Exam  ED Triage Vitals   Temperature Pulse Respirations Blood Pressure SpO2   08/02/18 1507 08/02/18 1507 08/02/18 1507 08/02/18 1518 08/02/18 1507   98 8 °F (37 1 "Please take a few minutes to complete the written survey that you may receive in the mail after your visit with us. Thank you!             Your Updated Medication List - Protect others around you: Learn how to safely use, store and throw away your medicines at www.disposemymeds.org.          This list is accurate as of 6/18/18 11:59 PM.  Always use your most recent med list.                   Brand Name Dispense Instructions for use Diagnosis    acyclovir 5 % ointment    ZOVIRAX    30 g    Apply topically 3 times daily    Cold sore       albuterol (2.5 MG/3ML) 0.083% neb solution      Inhale 2.5 mg into the lungs        AMICAR PO      Take 5 mLs by mouth 4 times daily as needed    DiGeorge syndrome (H)       Cetirizine HCl 1 MG/ML Soln     600 mL    GIVE \"BALJEET\" 10 ML(10 MG) BY MOUTH TWICE DAILY    Allergic rhinitis due to pollen, unspecified rhinitis seasonality       * DiphenhydrAMINE HCl 12.5 MG Chew    BENADRYL ALLERGY CHILDRENS    30 tablet    Take 1 tablet by mouth every 6 hours as needed    Allergic reaction, sequela       * diphenhydrAMINE 12.5 MG/5ML liquid    BANOPHEN    236 mL    Take 5 mLs (12.5 mg) by mouth every 6 hours as needed for allergies or sleep    Chronic allergic rhinitis due to pollen, unspecified seasonality       HIZENTRA 1 GM/5ML Soln   Generic drug:  Immune globulin      Inject 3 g Subcutaneous once        hydrOXYzine 10 MG/5ML syrup    ATARAX    240 mL    GIVE \"BALJEET\" 5 ML(10 MG) BY MOUTH THREE TIMES DAILY AS NEEDED FOR ITCHING    Chronic allergic rhinitis due to pollen, unspecified seasonality       lactulose 10 GM/15ML solution    CHRONULAC    1892 mL    Take 15 mLs (10 g) by mouth 3 times daily    Chronic constipation       Melatonin 2.5 MG Caps      Take by mouth At Bedtime        METOCLOPRAMIDE HCL PO      Take 2.5 mg by mouth        mometasone-formoterol 200-5 MCG/ACT oral inhaler    DULERA    13 g    Inhale 2 puffs into the lungs 2 times daily        mupirocin 2 % ointment    " °C) 86 18 128/70 96 %      Temp Source Heart Rate Source Patient Position - Orthostatic VS BP Location FiO2 (%)   08/02/18 1507 08/02/18 1507 08/02/18 1626 08/02/18 1626 --   Oral Monitor Lying Right arm       Pain Score       08/02/18 1507       No Pain           Orthostatic Vital Signs  Vitals:    08/02/18 1626 08/02/18 1713 08/02/18 1808 08/02/18 1901   BP: 120/59 134/64 102/58 128/60   Pulse: 85 88 94 95   Patient Position - Orthostatic VS: Lying Lying Lying Lying       Physical Exam   Constitutional: She appears well-developed and well-nourished  No distress  Elderly appearing   HENT:   Head: Normocephalic and atraumatic  Mouth/Throat: Oropharynx is clear and moist    Eyes: Conjunctivae and EOM are normal  Right eye exhibits no discharge  Left eye exhibits no discharge  Neck: Normal range of motion  Neck supple  Cardiovascular: Normal rate, regular rhythm and intact distal pulses  Pulmonary/Chest: Effort normal and breath sounds normal  No stridor  No respiratory distress  Abdominal: Soft  There is no tenderness  There is no rebound and no guarding  Genitourinary: Rectal exam shows guaiac positive stool  Genitourinary Comments: Black stool   Musculoskeletal: Normal range of motion  She exhibits edema (nonpitting)  She exhibits no tenderness  Neurological: She is alert  No sensory deficit  She exhibits normal muscle tone  Skin: Skin is warm and dry  L buttock and intergluteal cleft wound   Nursing note and vitals reviewed        ED Medications  Medications   sodium chloride 0 9 % bolus 1,000 mL (1,000 mL Intravenous New Bag 8/2/18 1910)   iodixanol (VISIPAQUE) 320 MG/ML injection 75 mL (75 mL Intravenous Given 8/2/18 1929)       Diagnostic Studies  Results Reviewed     Procedure Component Value Units Date/Time    Lactic acid, plasma [[id number]]  (Abnormal) Collected:  08/02/18 1604    Lab Status:  Final result Specimen:  Blood from Arm, Right Updated:  08/02/18 1820     LACTIC ACID 2 5 (HH) "BACTROBAN    22 g    Apply topically 2 times daily    Skin irritation, Gastrostomy tube in place (H)       OMEPRAZOLE PO      Take 20 mg by mouth        ondansetron 4 MG/5ML solution    ZOFRAN    60 mL    GIVE \"BALJEET\" 2.5 MLS BY MOUTH EVERY 8 HOURS AS NEEDED FOR NAUSEA OR VOMITING    Viral gastroenteritis       ORALYTE Soln     1000 mL    Deliver per G-tube when he isn't tolerating formula    Gastrostomy tube in place (H)       order for DME     1 Device    GPS tracking device`    Speech delay, DiGeorge's syndrome (H)       PAIN & FEVER CHILDRENS 160 MG/5ML solution   Generic drug:  acetaminophen     120 mL    GIVE \"BALJEET\" 7.5 ML(240 MG) BY MOUTH EVERY 4 HOURS AS NEEDED FOR FEVER OR MILD PAIN    Cough       POLY-VITAMIN/IRON 10 MG/ML Soln     1 Bottle    Take 1 mL by mouth At Bedtime    Poor feeding       polyethylene glycol powder    MIRALAX/GLYCOLAX    1530 g    GIVE \"BALJEET\" 1 TO 3 CAPFULS BY MOUTH DAILY    Chronic constipation       POOP GOOP (METRO MIXED)           PULMICORT 0.5 MG/2ML neb solution   Generic drug:  budesonide       Acute bronchitis due to respiratory syncytial virus (RSV)       Pulse Oximeter Misc     1 each    1 Units as needed Portable pulse oximeter    Iron deficiency       QVAR 80 MCG/ACT Inhaler   Generic drug:  beclomethasone      Inhale 1 puff into the lungs        sennosides 8.8 MG/5ML syrup    SENOKOT    236 mL    Take 5 mLs by mouth 2 times daily    Chronic constipation       simethicone 40 MG/0.6ML Liqd     216 mL    Take 1.8 mLs by mouth 4 times daily PRN gas    Chronic constipation       sodium phosphate 3.5-9.5 GM/59ML enema     30 enema    Place 1 enema rectally once as needed for constipation    Constipation       VENTOLIN IN      Inhale 2 puffs into the lungs every 4 hours as needed.        vitamin C 500 MG/5ML syrup    ASCORBIC ACID    118 mL    GIVE 1.25 MLS BY MOUTH AT BEDTIME,    Poor feeding       * Notice:  This list has 2 medication(s) that are the same as other " mmol/L     Narrative:         Result may be elevated if tourniquet was used during collection  CBC and differential [[id number]]  (Abnormal) Collected:  08/02/18 1604    Lab Status:  Final result Specimen:  Blood from Arm, Right Updated:  08/02/18 1818     WBC 17 79 (H) Thousand/uL      RBC 2 64 (L) Million/uL      Hemoglobin 7 7 (L) g/dL      Hematocrit 24 7 (L) %      MCV 94 fL      MCH 29 2 pg      MCHC 31 2 (L) g/dL      RDW 14 2 %      MPV 10 1 fL      Platelets 456 (H) Thousands/uL      nRBC 0 /100 WBCs     Narrative: This is an appended report  These results have been appended to a previously verified report  Comprehensive metabolic panel [[id number]]  (Abnormal) Collected:  08/02/18 1604    Lab Status:  Final result Specimen:  Blood from Arm, Right Updated:  08/02/18 1811     Sodium 134 (L) mmol/L      Potassium 4 7 mmol/L      Chloride 103 mmol/L      CO2 23 mmol/L      Anion Gap 8 mmol/L      BUN 55 (H) mg/dL      Creatinine 1 43 (H) mg/dL      Glucose 317 (H) mg/dL      Calcium 8 9 mg/dL      AST 30 U/L      ALT 32 U/L      Alkaline Phosphatase 109 U/L      Total Protein 6 5 g/dL      Albumin 1 7 (L) g/dL      Total Bilirubin 0 35 mg/dL      eGFR 34 ml/min/1 73sq m     Narrative:         National Kidney Disease Education Program recommendations are as follows:  GFR calculation is accurate only with a steady state creatinine  Chronic Kidney disease less than 60 ml/min/1 73 sq  meters  Kidney failure less than 15 ml/min/1 73 sq  meters      Ammonia [[id number]]  (Abnormal) Collected:  08/02/18 1604    Lab Status:  Final result Specimen:  Blood from Arm, Right Updated:  08/02/18 1802     Ammonia 37 (H) umol/L     Protime-INR [[id number]]  (Abnormal) Collected:  08/02/18 1604    Lab Status:  Final result Specimen:  Blood from Arm, Right Updated:  08/02/18 1759     Protime 15 6 (H) seconds      INR 1 23 (H)    APTT [[id number]]  (Normal) Collected:  08/02/18 1604    Lab Status:  Final result Specimen:  Blood from Arm, Right Updated:  08/02/18 0419     PTT 34 seconds                  CT abdomen pelvis with contrast   Final Result by Mary Shah MD (08/02 1952)   Suspect possible duodenitis  Fullness to the 2nd portion of the duodenum with 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 0 Filed at: 08/02/2018 1523   In the last 24 hours, have you needed more help than usual?  0 Filed at: 08/02/2018 1523   Have you been hospitalized for one or more nights during the past 6 months? 1 Filed at: 08/02/2018 1523   In general, do you see well?  0 Filed at: 08/02/2018 1523   In general, do you have serious problems with your memory? 0 Filed at: 08/02/2018 1523   Do you take more than three different medications every day? 1 Filed at: 08/02/2018 1523   ISAR Score  2 Filed at: 08/02/2018 1523                          MDM  Number of Diagnoses or Management Options  Diagnosis management comments: Acute drop in hgb from 9 2 to 7 7, very positive hemoccult  Afebrile, nontoxic appearing  Hemodynamically stable  However lactic acid elevated at 2 5  WBC 18  Will give 1 L NS and obtain CT ab/pelv to look for ischemia  Possible duodenitis  Will admit for GI bleed          CritCare Time    Disposition  Final diagnoses:   GI bleed   Acute blood loss anemia     Time reflects when diagnosis was documented in both MDM as applicable and the Disposition within this note     Time medications prescribed for you. Read the directions carefully, and ask your doctor or other care provider to review them with you.       User Action Codes Description Comment    8/2/2018  8:25 PM Modesto Sahu Add [K92 2] GI bleed     8/2/2018  8:25 PM Arortizchalino Sahu Add [D62] Acute blood loss anemia       ED Disposition     ED Disposition Condition Comment    Admit  Case was discussed with EDUARDO and the patient's admission status was agreed to be Admission Status: inpatient status to the service of Dr John Olmos   Follow-up Information    None         Patient's Medications   Discharge Prescriptions    No medications on file     No discharge procedures on file  ED Provider  Attending physically available and evaluated Marquis Abernathy I managed the patient along with the ED Attending      Electronically Signed by         Tameka Jalloh MD  08/02/18 2026

## 2018-08-03 ENCOUNTER — ANESTHESIA (INPATIENT)
Dept: GASTROENTEROLOGY | Facility: HOSPITAL | Age: 83
DRG: 378 | End: 2018-08-03
Payer: COMMERCIAL

## 2018-08-03 ENCOUNTER — ANESTHESIA EVENT (INPATIENT)
Dept: GASTROENTEROLOGY | Facility: HOSPITAL | Age: 83
DRG: 378 | End: 2018-08-03
Payer: COMMERCIAL

## 2018-08-03 PROBLEM — K92.2 GI BLEED: Status: ACTIVE | Noted: 2018-08-02

## 2018-08-03 PROBLEM — D50.0 IRON DEFICIENCY ANEMIA DUE TO CHRONIC BLOOD LOSS: Status: ACTIVE | Noted: 2018-08-02

## 2018-08-03 PROBLEM — D62 ACUTE BLOOD LOSS ANEMIA: Status: ACTIVE | Noted: 2018-07-21

## 2018-08-03 LAB
ABO GROUP BLD: NORMAL
ANION GAP SERPL CALCULATED.3IONS-SCNC: 7 MMOL/L (ref 4–13)
ANISOCYTOSIS BLD QL SMEAR: PRESENT
BASOPHILS # BLD MANUAL: 0 THOUSAND/UL (ref 0–0.1)
BASOPHILS NFR MAR MANUAL: 0 % (ref 0–1)
BLD GP AB SCN SERPL QL: NEGATIVE
BUN SERPL-MCNC: 48 MG/DL (ref 5–25)
CALCIUM SERPL-MCNC: 8 MG/DL (ref 8.3–10.1)
CHLORIDE SERPL-SCNC: 110 MMOL/L (ref 100–108)
CO2 SERPL-SCNC: 22 MMOL/L (ref 21–32)
CREAT SERPL-MCNC: 1.16 MG/DL (ref 0.6–1.3)
EOSINOPHIL # BLD MANUAL: 0 THOUSAND/UL (ref 0–0.4)
EOSINOPHIL NFR BLD MANUAL: 0 % (ref 0–6)
ERYTHROCYTE [DISTWIDTH] IN BLOOD BY AUTOMATED COUNT: 14.5 % (ref 11.6–15.1)
GFR SERPL CREATININE-BSD FRML MDRD: 44 ML/MIN/1.73SQ M
GLUCOSE SERPL-MCNC: 151 MG/DL (ref 65–140)
GLUCOSE SERPL-MCNC: 239 MG/DL (ref 65–140)
GLUCOSE SERPL-MCNC: 252 MG/DL (ref 65–140)
GLUCOSE SERPL-MCNC: 257 MG/DL (ref 65–140)
GLUCOSE SERPL-MCNC: 302 MG/DL (ref 65–140)
GLUCOSE SERPL-MCNC: 371 MG/DL (ref 65–140)
HCT VFR BLD AUTO: 18.6 % (ref 34.8–46.1)
HEMOCCULT STL QL: POSITIVE
HGB BLD-MCNC: 5.8 G/DL (ref 11.5–15.4)
HGB BLD-MCNC: 8.4 G/DL (ref 11.5–15.4)
LYMPHOCYTES # BLD AUTO: 1.3 THOUSAND/UL (ref 0.6–4.47)
LYMPHOCYTES # BLD AUTO: 10 % (ref 14–44)
MAGNESIUM SERPL-MCNC: 1.9 MG/DL (ref 1.6–2.6)
MCH RBC QN AUTO: 29.4 PG (ref 26.8–34.3)
MCHC RBC AUTO-ENTMCNC: 31.2 G/DL (ref 31.4–37.4)
MCV RBC AUTO: 94 FL (ref 82–98)
METAMYELOCYTES NFR BLD MANUAL: 1 % (ref 0–1)
MONOCYTES # BLD AUTO: 0.13 THOUSAND/UL (ref 0–1.22)
MONOCYTES NFR BLD: 1 % (ref 4–12)
NEUTROPHILS # BLD MANUAL: 11.44 THOUSAND/UL (ref 1.85–7.62)
NEUTS SEG NFR BLD AUTO: 88 % (ref 43–75)
NRBC BLD AUTO-RTO: 0 /100 WBCS
PHOSPHATE SERPL-MCNC: 2 MG/DL (ref 2.3–4.1)
PLATELET # BLD AUTO: 553 THOUSANDS/UL (ref 149–390)
PLATELET BLD QL SMEAR: ABNORMAL
PMV BLD AUTO: 9.8 FL (ref 8.9–12.7)
POIKILOCYTOSIS BLD QL SMEAR: PRESENT
POLYCHROMASIA BLD QL SMEAR: PRESENT
POTASSIUM SERPL-SCNC: 4.3 MMOL/L (ref 3.5–5.3)
RBC # BLD AUTO: 1.97 MILLION/UL (ref 3.81–5.12)
RBC MORPH BLD: PRESENT
RH BLD: POSITIVE
SODIUM SERPL-SCNC: 139 MMOL/L (ref 136–145)
SPECIMEN EXPIRATION DATE: NORMAL
WBC # BLD AUTO: 13 THOUSAND/UL (ref 4.31–10.16)

## 2018-08-03 PROCEDURE — C9113 INJ PANTOPRAZOLE SODIUM, VIA: HCPCS | Performed by: INTERNAL MEDICINE

## 2018-08-03 PROCEDURE — 86850 RBC ANTIBODY SCREEN: CPT | Performed by: INTERNAL MEDICINE

## 2018-08-03 PROCEDURE — 85007 BL SMEAR W/DIFF WBC COUNT: CPT | Performed by: INTERNAL MEDICINE

## 2018-08-03 PROCEDURE — 82948 REAGENT STRIP/BLOOD GLUCOSE: CPT

## 2018-08-03 PROCEDURE — 30233N1 TRANSFUSION OF NONAUTOLOGOUS RED BLOOD CELLS INTO PERIPHERAL VEIN, PERCUTANEOUS APPROACH: ICD-10-PCS | Performed by: INTERNAL MEDICINE

## 2018-08-03 PROCEDURE — 85018 HEMOGLOBIN: CPT | Performed by: INTERNAL MEDICINE

## 2018-08-03 PROCEDURE — P9040 RBC LEUKOREDUCED IRRADIATED: HCPCS

## 2018-08-03 PROCEDURE — 83735 ASSAY OF MAGNESIUM: CPT | Performed by: INTERNAL MEDICINE

## 2018-08-03 PROCEDURE — 82272 OCCULT BLD FECES 1-3 TESTS: CPT | Performed by: INTERNAL MEDICINE

## 2018-08-03 PROCEDURE — 84100 ASSAY OF PHOSPHORUS: CPT | Performed by: INTERNAL MEDICINE

## 2018-08-03 PROCEDURE — 80048 BASIC METABOLIC PNL TOTAL CA: CPT | Performed by: INTERNAL MEDICINE

## 2018-08-03 PROCEDURE — 99233 SBSQ HOSP IP/OBS HIGH 50: CPT | Performed by: INTERNAL MEDICINE

## 2018-08-03 PROCEDURE — 86901 BLOOD TYPING SEROLOGIC RH(D): CPT | Performed by: INTERNAL MEDICINE

## 2018-08-03 PROCEDURE — 86900 BLOOD TYPING SEROLOGIC ABO: CPT | Performed by: INTERNAL MEDICINE

## 2018-08-03 PROCEDURE — 99222 1ST HOSP IP/OBS MODERATE 55: CPT | Performed by: INTERNAL MEDICINE

## 2018-08-03 PROCEDURE — 43255 EGD CONTROL BLEEDING ANY: CPT | Performed by: INTERNAL MEDICINE

## 2018-08-03 PROCEDURE — 85027 COMPLETE CBC AUTOMATED: CPT | Performed by: INTERNAL MEDICINE

## 2018-08-03 PROCEDURE — P9016 RBC LEUKOCYTES REDUCED: HCPCS

## 2018-08-03 PROCEDURE — 0W3P8ZZ CONTROL BLEEDING IN GASTROINTESTINAL TRACT, VIA NATURAL OR ARTIFICIAL OPENING ENDOSCOPIC: ICD-10-PCS | Performed by: INTERNAL MEDICINE

## 2018-08-03 RX ORDER — PROPOFOL 10 MG/ML
INJECTION, EMULSION INTRAVENOUS AS NEEDED
Status: DISCONTINUED | OUTPATIENT
Start: 2018-08-03 | End: 2018-08-03 | Stop reason: SURG

## 2018-08-03 RX ORDER — INSULIN GLARGINE 100 [IU]/ML
15 INJECTION, SOLUTION SUBCUTANEOUS
Status: DISCONTINUED | OUTPATIENT
Start: 2018-08-04 | End: 2018-08-10 | Stop reason: HOSPADM

## 2018-08-03 RX ORDER — LIDOCAINE HYDROCHLORIDE 10 MG/ML
INJECTION, SOLUTION INFILTRATION; PERINEURAL AS NEEDED
Status: DISCONTINUED | OUTPATIENT
Start: 2018-08-03 | End: 2018-08-03 | Stop reason: SURG

## 2018-08-03 RX ORDER — METOCLOPRAMIDE HYDROCHLORIDE 5 MG/ML
10 INJECTION INTRAMUSCULAR; INTRAVENOUS ONCE
Status: COMPLETED | OUTPATIENT
Start: 2018-08-03 | End: 2018-08-03

## 2018-08-03 RX ADMIN — PREDNISOLONE ACETATE 1 DROP: 10 SUSPENSION/ DROPS OPHTHALMIC at 10:12

## 2018-08-03 RX ADMIN — PROPOFOL 25 MG: 10 INJECTION, EMULSION INTRAVENOUS at 11:35

## 2018-08-03 RX ADMIN — INSULIN LISPRO 4 UNITS: 100 INJECTION, SOLUTION INTRAVENOUS; SUBCUTANEOUS at 07:18

## 2018-08-03 RX ADMIN — SODIUM CHLORIDE 75 ML/HR: 0.9 INJECTION, SOLUTION INTRAVENOUS at 08:33

## 2018-08-03 RX ADMIN — PROPOFOL 25 MG: 10 INJECTION, EMULSION INTRAVENOUS at 11:43

## 2018-08-03 RX ADMIN — LEVOTHYROXINE SODIUM 75 MCG: 75 TABLET ORAL at 05:11

## 2018-08-03 RX ADMIN — INSULIN LISPRO 3 UNITS: 100 INJECTION, SOLUTION INTRAVENOUS; SUBCUTANEOUS at 17:14

## 2018-08-03 RX ADMIN — BIMATOPROST 1 DROP: 0.1 SOLUTION/ DROPS OPHTHALMIC at 21:42

## 2018-08-03 RX ADMIN — POLYETHYLENE GLYCOL 3350 17 G: 17 POWDER, FOR SOLUTION ORAL at 09:18

## 2018-08-03 RX ADMIN — METOCLOPRAMIDE 10 MG: 5 INJECTION, SOLUTION INTRAMUSCULAR; INTRAVENOUS at 10:13

## 2018-08-03 RX ADMIN — NYSTATIN: 100000 POWDER TOPICAL at 17:15

## 2018-08-03 RX ADMIN — DORZOLAMIDE HYDROCHLORIDE AND TIMOLOL MALEATE 1 DROP: 20; 5 SOLUTION/ DROPS OPHTHALMIC at 10:12

## 2018-08-03 RX ADMIN — INSULIN GLARGINE 6 UNITS: 100 INJECTION, SOLUTION SUBCUTANEOUS at 21:41

## 2018-08-03 RX ADMIN — SODIUM CHLORIDE 8 MG/HR: 9 INJECTION, SOLUTION INTRAVENOUS at 21:41

## 2018-08-03 RX ADMIN — SODIUM CHLORIDE 75 ML/HR: 0.9 INJECTION, SOLUTION INTRAVENOUS at 21:41

## 2018-08-03 RX ADMIN — INSULIN LISPRO 6 UNITS: 100 INJECTION, SOLUTION INTRAVENOUS; SUBCUTANEOUS at 00:25

## 2018-08-03 RX ADMIN — METOPROLOL TARTRATE 25 MG: 25 TABLET ORAL at 21:41

## 2018-08-03 RX ADMIN — DOCUSATE SODIUM 100 MG: 100 CAPSULE, LIQUID FILLED ORAL at 17:15

## 2018-08-03 RX ADMIN — LIDOCAINE HYDROCHLORIDE 80 MG: 10 INJECTION, SOLUTION INFILTRATION; PERINEURAL at 11:26

## 2018-08-03 RX ADMIN — DORZOLAMIDE HYDROCHLORIDE AND TIMOLOL MALEATE 1 DROP: 20; 5 SOLUTION/ DROPS OPHTHALMIC at 17:15

## 2018-08-03 RX ADMIN — METOPROLOL TARTRATE 25 MG: 25 TABLET ORAL at 09:11

## 2018-08-03 RX ADMIN — SODIUM PHOSPHATE, MONOBASIC, MONOHYDRATE 6 MMOL: 276; 142 INJECTION, SOLUTION INTRAVENOUS at 15:45

## 2018-08-03 RX ADMIN — SODIUM CHLORIDE 8 MG/HR: 9 INJECTION, SOLUTION INTRAVENOUS at 08:33

## 2018-08-03 RX ADMIN — PROPOFOL 75 MG: 10 INJECTION, EMULSION INTRAVENOUS at 11:26

## 2018-08-03 RX ADMIN — PRAVASTATIN SODIUM 40 MG: 40 TABLET ORAL at 17:15

## 2018-08-03 RX ADMIN — PREDNISOLONE ACETATE 1 DROP: 10 SUSPENSION/ DROPS OPHTHALMIC at 17:15

## 2018-08-03 RX ADMIN — PROPOFOL 25 MG: 10 INJECTION, EMULSION INTRAVENOUS at 11:30

## 2018-08-03 RX ADMIN — VITAMIN D, TAB 1000IU (100/BT) 2000 UNITS: 25 TAB at 09:11

## 2018-08-03 RX ADMIN — NYSTATIN: 100000 POWDER TOPICAL at 09:18

## 2018-08-03 RX ADMIN — DOCUSATE SODIUM 100 MG: 100 CAPSULE, LIQUID FILLED ORAL at 09:11

## 2018-08-03 RX ADMIN — PROPOFOL 25 MG: 10 INJECTION, EMULSION INTRAVENOUS at 11:40

## 2018-08-03 NOTE — ASSESSMENT & PLAN NOTE
No results found for: HGBA1C    Recent Labs      08/01/18   2214  08/01/18   2331  08/02/18   0026  08/02/18   0144   POCGLU  459*  432*  372*  325*     We are concerned about possibility of GI bleed/melena  That said patient is placed NPO  Will check blood sugars every 6 hours for now  Cover with insulin sliding scale    Blood Sugar Average: Last 72 hrs:

## 2018-08-03 NOTE — CASE MANAGEMENT
Initial Clinical Review    Admission: Date/Time/Statement: 8/2/18 @ 2026     Orders Placed This Encounter   Procedures    Inpatient Admission (expected length of stay for this patient is greater than two midnights)     Standing Status:   Standing     Number of Occurrences:   1     Order Specific Question:   Admitting Physician     Answer:   Wilma Jara [[id number]]     Order Specific Question:   Level of Care     Answer:   Med Surg [[id number]]     Order Specific Question:   Estimated length of stay     Answer:   More than 2 Midnights     Order Specific Question:   Certification     Answer:   I certify that inpatient services are medically necessary for this patient for a duration of greater than two midnights  See H&P and MD Progress Notes for additional information about the patient's course of treatment  ED: Date/Time/Mode of Arrival:   ED Arrival Information     Expected Arrival Acuity Means of Arrival Escorted By Service Admission Type    - 8/2/2018 15:04 Emergent Ambulance [facility] 79 Complaint    Abnormal Labs          Chief Complaint:   Chief Complaint   Patient presents with    Abnormal Lab     pt was told she had to come in for abnormal H and H labs  facility is suspicious of GI bleed  History of Illness:  81 y o  female who is a resident of the nursing home  The patient has a longstanding history of anemia but baseline seems to be around 9  The patient was sent here because according to laboratories her hemoglobin is in the line of 7       ED Vital Signs:   ED Triage Vitals   Temperature Pulse Respirations Blood Pressure SpO2   08/02/18 1507 08/02/18 1507 08/02/18 1507 08/02/18 1518 08/02/18 1507   98 8 °F (37 1 °C) 86 18 128/70 96 %      Temp Source Heart Rate Source Patient Position - Orthostatic VS BP Location FiO2 (%)   08/02/18 1507 08/02/18 1507 08/02/18 1626 08/02/18 1626 --   Oral Monitor Lying Right arm       Pain Score       08/02/18 1507 No Pain        Wt Readings from Last 1 Encounters:   08/03/18 96 2 kg (212 lb)       Abnormal Labs/Diagnostic Test Results: Lactic Acid 2 5, WBC 17 79, H/H 7 7/24 7, Platelets, Na 089, Bun 55, Creat 1 43, Glucose 317, Ammonia 37                                                                       8/3/2018 H/H 5 8/18 6    CT of Abd/Pelvis: Suspect possible duodenitis  Fullness to the 2nd portion of the duodenum with 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 along the 2nd portion of the duodenum   With history of GI bleeding, consider direct visualization/endoscopy when clinically warranted to rule out pathology in this region  ED Treatment:   Medication Administration from 08/02/2018 1504 to 08/02/2018 2121       Date/Time Order Dose Route Action Action by Comments     08/02/2018 1910 sodium chloride 0 9 % bolus 1,000 mL 1,000 mL Intravenous New Bag Regina Mike RN      08/02/2018 1929 iodixanol (VISIPAQUE) 320 MG/ML injection 75 mL 75 mL Intravenous Given Rafael Holly           Past Medical/Surgical History: Active Ambulatory Problems     Diagnosis Date Noted    Type 2 diabetes mellitus with diabetic polyneuropathy, without long-term current use of insulin ([facility] [address] ) 07/20/2018    Decubitus ulcer of buttock, stage 2 07/20/2018    Essential hypertension 07/20/2018    Morbid obesity ([facility] [address] ) 07/20/2018    Candidal intertrigo 07/20/2018    Ambulatory dysfunction 07/21/2018    Anemia 07/21/2018    Positive blood culture 07/22/2018    CKD (chronic kidney disease) stage 3, GFR 30-59 ml/min 07/25/2018     Resolved Ambulatory Problems     Diagnosis Date Noted    Acute kidney injury ([facility] [address] ) 07/20/2018     Past Medical History:   Diagnosis Date    Diabetes mellitus ([facility] [address] )     Hyperlipidemia     Hypertension        Admitting Diagnosis: Acute blood loss anemia [D62]  GI bleed [K92 2]  Abnormal laboratory test result [R89 9]  Decubitus ulcer of left buttock, stage 2 [L89 322]    Age/Sex: 81 y o  female    Assessment/Plan:     * Anemia   Assessment & Plan     Anemia seems to be new onset  The patient already had an iron profile done quite recently and was shown to be iron deficient  Questionable GI bleeding  Questionable melanotic stool and heme-positive from below  GI consult  NPO  Started on Protonix  Hemoglobin and hematocrit every 8 hours for 3 occurrences  Type and screen  Meanwhile patient has elevated lactic acid may be secondary to acute kidney insufficiency on top of CKD; would check lactic acid with fluids  We anticipate hemoglobin to go down further therefore blood transfusion consent in chart  Put on hold aspirin           CKD (chronic kidney disease) stage 3, GFR 30-59 ml/min   Assessment & Plan     Patient has chronic kidney disease seems to be stable at this point although appears to be dehydrated  Current creatinine may also be misleading due to patient's nutritional status  We will check patient's lactic acid every 2 hours as per protocol, give fluids very carefully  It does not appear that patient looks infected at this point  Will check urinalysis  Noted patient has WBCs elevated as with platelet count which may be a sign of acute phase reactant           Decubitus ulcer of buttock, stage 2   Assessment & Plan     Wound Care to see patient  Turn patient every 2 hours           Type 2 diabetes mellitus with diabetic polyneuropathy, without long-term current use of insulin [facility])   Assessment & Plan     No results found for: HGBA1C            Recent Labs      08/01/18   2214  08/01/18   2331  08/02/18   0026  08/02/18   0144   POCGLU  459*  432*  372*  325*      We are concerned about possibility of GI bleed/melena  That said patient is placed NPO  Will check blood sugars every 6 hours for now  Cover with insulin sliding scale    Blood Sugar Average: Last 72 hrs:                   VTE Prophylaxis: Pharmacologic VTE Prophylaxis contraindicated due to Possible melena  / sequential compression device   Code Status: Prior full Code as discussed with patient  POLST: There is no POLST form on file for this patient (pre-hospital)     Anticipated Length of Stay:  Patient will be admitted on an Inpatient basis with an anticipated length of stay of  greater than 2 midnights     Justification for Hospital Stay: Please see detailed plans noted above          Admission Orders:  Inpatient/MedSurg  Consult GI r/e gi bleed  Consult Wound Care r/e decubitus ulcer of left buttock, stage 2   Bilateral Sequential Compression Device  Transfusion 1 unit PRBCs  H&H q8h  Daily Weights  SSI  NPO, sips with meds  NSS @ 75  IV Protonix Drip    Scheduled Meds:   Current Facility-Administered Medications:  bimatoprost 1 drop Both Eyes HS   cholecalciferol 2,000 Units Oral Daily   docusate sodium 100 mg Oral BID   dorzolamide-timolol 1 drop Both Eyes BID   insulin glargine 6 Units Subcutaneous HS   insulin lispro 1-6 Units Subcutaneous TID With Meals   levothyroxine 75 mcg Oral Daily   metoprolol tartrate 25 mg Oral Q12H SABRINA   nystatin  Topical BID   polyethylene glycol 17 g Oral Daily   pravastatin 40 mg Oral Daily With Dinner   prednisoLONE acetate 1 drop Both Eyes BID   sodium phosphate 6 mmol Intravenous Once     Continuous Infusions:   pantoprozole (PROTONIX) infusion (Continuous) 8 mg/hr Last Rate: 8 mg/hr (08/03/18 0833)   sodium chloride 75 mL/hr Last Rate: 75 mL/hr (08/03/18 0833)     PRN Meds: HYDROcodone-acetaminophen    ondansetron

## 2018-08-03 NOTE — H&P
H&P- Deb Locke 1935, 81 y o  female MRN: [id number]    Unit/Bed#: ED 23 Encounter: [id number]    Primary Care Provider: Regina Christie MD   Date and time admitted to hospital: 8/2/2018  3:04 PM        * Anemia   Assessment & Plan    Anemia seems to be new onset  The patient already had an iron profile done quite recently and was shown to be iron deficient  Questionable GI bleeding  Questionable melanotic stool and heme-positive from below  GI consult  NPO  Started on Protonix  Hemoglobin and hematocrit every 8 hours for 3 occurrences  Type and screen  Meanwhile patient has elevated lactic acid may be secondary to acute kidney insufficiency on top of CKD; would check lactic acid with fluids  We anticipate hemoglobin to go down further therefore blood transfusion consent in chart  Put on hold aspirin  CKD (chronic kidney disease) stage 3, GFR 30-59 ml/min   Assessment & Plan    Patient has chronic kidney disease seems to be stable at this point although appears to be dehydrated  Current creatinine may also be misleading due to patient's nutritional status  We will check patient's lactic acid every 2 hours as per protocol, give fluids very carefully  It does not appear that patient looks infected at this point  Will check urinalysis  Noted patient has WBCs elevated as with platelet count which may be a sign of acute phase reactant  Decubitus ulcer of buttock, stage 2   Assessment & Plan    Wound Care to see patient  Turn patient every 2 hours  Type 2 diabetes mellitus with diabetic polyneuropathy, without long-term current use of insulin [facility])   Assessment & Plan    No results found for: HGBA1C    Recent Labs      08/01/18   2214  08/01/18   2331  08/02/18   0026  08/02/18   0144   POCGLU  459*  432*  372*  325*     We are concerned about possibility of GI bleed/melena  That said patient is placed NPO  Will check blood sugars every 6 hours for now    Cover with insulin sliding scale  Blood Sugar Average: Last 72 hrs:                VTE Prophylaxis: Pharmacologic VTE Prophylaxis contraindicated due to Possible melena  / sequential compression device   Code Status: Prior full Code as discussed with patient  POLST: There is no POLST form on file for this patient (pre-hospital)    Anticipated Length of Stay:  Patient will be admitted on an Inpatient basis with an anticipated length of stay of  greater than 2 midnights  Justification for Hospital Stay: Please see detailed plans noted above  Chief Complaint:     Abnormal laboratories  History of Present Illness:  Oksana Tan is a 81 y o  female who is a resident of the nursing home  The patient has a longstanding history of anemia but baseline seems to be around 9  The patient was sent here because according to laboratories her hemoglobin is in the line of 7  She denies any chest discomfort or lightheadedness or dizziness  Likewise she denies any epigastric pain or chest pain or sourbreath indicative of any reflux disease or vomiting  No diarrhea  Patient is able to say where she is, her name and the year  However she says she does not know why she is here but she presumes that she is sick  Currently the patient looks comfortable  She also denies any dysuria  No note of any cough or cold or fever  Review of Systems:    Constitutional:  Denies fever or chills   Eyes:  Denies change in visual acuity   HENT:  Denies nasal congestion or sore throat   Respiratory:  Denies cough or shortness of breath   Cardiovascular:  Denies chest pain or edema   GI:  Denies abdominal pain, nausea, vomiting, bloody stools or diarrhea   :  Denies dysuria   Musculoskeletal:  Denies back pain or joint pain   Integument:  Denies rash; with note of decubitus ulcer on the left side stage 2 to 3 which she knows that she has      Neurologic:  Denies headache, focal weakness or sensory changes   Endocrine:  Denies polyuria or polydipsia   Lymphatic:  Denies swollen glands   Psychiatric:  Denies depression or anxiety     Past Medical and Surgical History:   Past Medical History:   Diagnosis Date    Diabetes mellitus ([address] )     Type 2    Hyperlipidemia     Hypertension      History reviewed  No pertinent surgical history  Meds/Allergies:  aspirin (ECOTRIN LOW STRENGTH) 81 mg EC tablet Take 81 mg by mouth daily Josh Terrazas MD Not Ordered   bimatoprost (LUMIGAN) 0 01 % ophthalmic drops Administer 1 drop to both eyes daily at bedtime Josh Terrazas MD Reordered   Ordered as: bimatoprost (LUMIGAN) 0 01 % ophthalmic solution 1 drop - 1 drop, Both Eyes, Daily at bedtime, First dose on Thu 8/2/18 at 2200   brimonidine-timolol (COMBIGAN) 0 2-0 5 % Administer 1 drop to the right eye every 12 (twelve) hours Josh Terrazas MD Reordered   Ordered as: dorzolamide-timolol (COSOPT) 22 3-6 8 MG/ML ophthalmic solution 1 drop - 1 drop, Both Eyes, 2 times daily, First dose on Thu 8/2/18 at 2045   CHOLECALCIFEROL PO Take 1 tablet by mouth daily Josh Terrazas MD Reordered   Ordered as: Cholecalciferol TABS 2,000 Units - 2,000 Units, Oral, Daily, First dose on Fri 8/3/18 at 0900   docusate sodium (COLACE) 100 mg capsule Take 1 capsule (100 mg total) by mouth 2 (two) times a day Josh Terrazas MD Reordered   Ordered as: docusate sodium (COLACE) capsule 100 mg - 100 mg, Oral, 2 times daily, First dose on Thu 8/2/18 at 2045   HYDROcodone-acetaminophen (XODOL) 5-300 MG per tablet Take 0 5 tablets by mouth 2 (two) times a day as needed for moderate pain Max Daily Amount: 1 tablet Josh Terrazas MD Reordered   Ordered as: HYDROcodone-acetaminophen ([address]) 5-325 mg per tablet 1 tablet - 1 tablet, Oral, 2 times daily PRN, moderate pain, Starting Thu 8/2/18 at 2032 High Alert Medication   LOOK ALIKE SOUND ALIKE MED    insulin glargine (LANTUS) 100 units/mL subcutaneous injection Inject 12 Units under the skin daily at bedtime Josh Terrazas MD Reordered   Ordered as: insulin glargine (LANTUS) subcutaneous injection 6 Units 0 06 mL - 6 Units, Subcutaneous, Daily at bedtime, First dose on Thu 8/2/18 at 2200 Only half dose in the light of patient being NPO  HIGH ALERT MEDICATION  Do not dilute/mix insulin glargine with any other insulin formulation/solution  **DISPOSE IN 8 GALLON BLACK CONTAINER** Do not hold medication without a physician order  insulin lispro (HumaLOG) 100 units/mL injection Inject 1-5 Units under the skin 3 (three) times a day before meals Bella Kaiser MD Not Ordered   insulin lispro (HumaLOG) 100 units/mL injection Inject 5 Units under the skin 3 (three) times a day with meals Bella Kaiser MD Not Ordered   levothyroxine 75 mcg tablet Take 75 mcg by mouth daily Bella Kaiser MD Reordered   Ordered as: levothyroxine tablet 75 mcg - 75 mcg, Oral, Daily, First dose on Fri 8/3/18 at 0900 Administer in the morning on an empty stomach, at least 30 to 60 minutes before food  Tablets may be crushed and suspended in 5-10mls of water for administration  LOOK ALIKE SOUND ALIKE MED  Linagliptin (TRADJENTA) 5 MG TABS Take 5 mg by mouth daily Bella Kaiser MD Not Ordered   metoprolol tartrate (LOPRESSOR) 50 mg tablet Take 25 mg by mouth every 12 (twelve) hours Bella Kaiser MD Reordered   Ordered as: metoprolol tartrate (LOPRESSOR) tablet 25 mg - 25 mg, Oral, Every 12 hours scheduled, First dose on Thu 8/2/18 at 2100 Hold for heart rate less than 50 beats per minute   LOOK ALIKE SOUND ALIKE MED Hold for systolic blood pressure less than (mmHg): 110   nystatin (MYCOSTATIN) powder Apply topically 2 (two) times a day Bella Kaiser MD Reordered   Ordered as: nystatin (MYCOSTATIN) powder - Topical, 2 times daily, First dose on Thu 8/2/18 at 2045 LOOK ALIKE SOUND ALIKE MED    polyethylene glycol (MIRALAX) 17 g packet Take 17 g by mouth daily Bella Kaiser MD Reordered   Ordered as: polyethylene glycol (MIRALAX) packet 17 g - 17 g, Oral, Daily, First dose on Fri 8/3/18 at 0900 Stir powder in 4-8 ounces of water, juice, soda, coffee or tea until dissolved and drink  LOOK ALIKE SOUND ALIKE MED    pravastatin (PRAVACHOL) 40 mg tablet Take 40 mg by mouth daily Blaine Stanley MD Reordered   Ordered as: pravastatin (PRAVACHOL) tablet 40 mg - 40 mg, Oral, Daily, First dose on Fri 8/3/18 at 0900   prednisoLONE acetate (PRED FORTE) 1 % ophthalmic suspension Administer 1 drop to both eyes 2 (two) times a day Blaine Stanley MD Reordered   Ordered as: prednisoLONE acetate (PRED FORTE) 1 % ophthalmic suspension 1 drop - 1 drop, Both Eyes, 2 times daily, First dose on Thu 8/2/18 at 2045 shake well before use  LOOK ALIKE SOUND ALIKE MED         Allergies: No Known Allergies  History:  Marital Status:    Occupation: retired but ysed to work in [place] [id number]   Patient Pre-hospital Living Situation: NH  Patient Pre-hospital Level of Mobility:  Needing assistance  Patient Pre-hospital Diet Restrictions:  Cardiac diabetic  Substance Use History:   History   Alcohol Use No     History   Smoking Status    Never Smoker   Smokeless Tobacco    Never Used     History   Drug Use No       Family History:  History reviewed  No pertinent family history  Physical Exam:     Vitals:   Blood Pressure: 121/54 (08/02/18 2030)  Pulse: 92 (08/02/18 2030)  Temperature: 98 8 °F (37 1 °C) (08/02/18 1507)  Temp Source: Oral (08/02/18 1507)  Respirations: 18 (08/02/18 2030)  Weight - Scale: 100 kg (220 lb 7 4 oz) (08/02/18 1507)  SpO2: 96 % (08/02/18 2030)    Constitutional:  Well developed, morbidly obese, no acute distress, non-toxic appearance but appear sickly  Eyes:  PERRL, conjunctiva normal   HENT:  Atraumatic, external ears normal, nose normal, oropharynx dry, no pharyngeal exudates   Neck- normal range of motion, no tenderness, supple   Respiratory:  No respiratory distress, normal breath sounds, no rales, no wheezing   Cardiovascular:  Normal rate, normal rhythm, no murmurs, no gallops, no rubs   GI:  Soft, nondistended, normal bowel sounds, nontender, no organomegaly, no mass, no rebound, no guarding   :  No costovertebral angle tenderness   Musculoskeletal:  No edema, no tenderness, no deformities  Back- no tenderness  Integument:  Well hydrated, with note of stage II ulcer at the left buttock  Lymphatic:  No lymphadenopathy noted   Neurologic:  Alert &awake, communicative, oriented x3, CN 2-12 normal, normal motor function, normal sensory function, no focal deficits noted   Psychiatric:  Speech and behavior appropriate and communicative without any distress      Lab Results: I have personally reviewed pertinent reports  Results from last 7 days  Lab Units 08/02/18  1604   WBC Thousand/uL 17 79*   HEMOGLOBIN g/dL 7 7*   HEMATOCRIT % 24 7*   PLATELETS Thousands/uL 633*   LYMPHO PCT % 5*   MONO PCT MAN % 4   EOSINO PCT MANUAL % 1       Results from last 7 days  Lab Units 08/02/18  1604   SODIUM mmol/L 134*   POTASSIUM mmol/L 4 7   CHLORIDE mmol/L 103   CO2 mmol/L 23   BUN mg/dL 55*   CREATININE mg/dL 1 43*   CALCIUM mg/dL 8 9   TOTAL PROTEIN g/dL 6 5   BILIRUBIN TOTAL mg/dL 0 35   ALK PHOS U/L 109   ALT U/L 32   AST U/L 30   GLUCOSE RANDOM mg/dL 317*       Results from last 7 days  Lab Units 08/02/18  1604   INR  1 23*       Imaging: I have personally reviewed pertinent reports  Xr Chest 2 Views    Result Date: 8/2/2018  Narrative: CHEST INDICATION:   Cough  COMPARISON:  None EXAM PERFORMED/VIEWS:  XR CHEST PA & LATERAL FINDINGS: The cardiac silhouette appears top normal with uncoiled thoracic aorta  The lungs are clear  No pneumothorax or pleural effusion  Degenerative changes of the spine  Impression: No acute cardiopulmonary disease  Workstation performed: [id number]     Us Kidney And Bladder    Result Date: 7/20/2018  Narrative: RENAL ULTRASOUND INDICATION:   Acute renal failure   COMPARISON: None TECHNIQUE:   Ultrasound of the retroperitoneum was performed with a curvilinear transducer utilizing volumetric sweeps and still imaging techniques  FINDINGS: KIDNEYS: Renal cortices are echogenic consistent with medical renal disease  Left kidney is somewhat atrophic  No sonographically detectable solid renal mass  No hydronephrosis  No echogenic shadowing urinary tract calculi  Right kidney:  10 cm  Left kidney:  8 0 cm  URETERS: Nonvisualized  BLADDER: Normally distended  No focal thickening or mass lesions  Impression: Atrophic left kidney with renal cortical thinning  Echogenic renal cortices bilaterally consistent with medical renal disease  No hydronephrosis  Workstation performed: [id number]     Vas Lower Limb Venous Duplex Study, Complete Bilateral    Result Date: 7/22/2018  Narrative:  [facility] REPORT CLINICAL: Indications: Bilateral Swelling of Limb [R22 4]  FINDINGS:  Segment  Right            Left              Impression       Impression       CFV      Normal (Patent)  Normal (Patent)     CONCLUSION:  Impression: RIGHT LOWER LIMB: No gross evidence of acute or chronic deep vein thrombosis  No gross evidence of superficial thrombophlebitis noted  Doppler evaluation shows a normal response to augmentation maneuvers  Popliteal, posterior tibial and anterior tibial arterial Doppler waveforms are triphasic LEFT LOWER LIMB: No gross evidence of acute or chronic deep vein thrombosis  No gross evidence of superficial thrombophlebitis noted  Doppler evaluation shows a normal response to augmentation maneuvers  Popliteal, posterior tibial and anterior tibial arterial Doppler waveforms are triphasic   Technically difficult/limited study   SIGNATURE: Electronically Signed by: Elisa Sanchez on 2018-07-22 09:17:31 PM    Ct Abdomen Pelvis With Contrast    Result Date: 8/2/2018  Narrative: CT ABDOMEN AND PELVIS WITH IV CONTRAST INDICATION:   elevated lactic acid, gastrointestinal bleed  COMPARISON:  None   TECHNIQUE:  CT examination of the abdomen and pelvis was performed  Axial, sagittal, and coronal 2D reformatted images were created from the source data and submitted for interpretation  Radiation dose length product (DLP) for this visit:  1083 92 mGy-cm   This examination, like all CT scans performed in the [facility], was performed utilizing techniques to minimize radiation dose exposure, including the use of iterative reconstruction and automated exposure control  IV Contrast:  75 mL of iodixanol (VISIPAQUE) Enteric Contrast:  Enteric contrast was not administered  FINDINGS: ABDOMEN LOWER CHEST:  No clinically significant abnormality identified in the visualized lower chest  LIVER/BILIARY TREE:  One or more subcentimeter sharply circumscribed low-density hepatic lesion(s) are noted, too small to accurately characterize, but statistically most likely to represent subcentimeter hepatic cysts  No suspicious solid hepatic lesion is identified  Hepatic contours are normal   No biliary dilatation  GALLBLADDER:  No calcified gallstones  No pericholecystic inflammatory change  SPLEEN:  Unremarkable  PANCREAS:  Unremarkable  ADRENAL GLANDS:  Unremarkable  KIDNEYS/URETERS:  Unremarkable  No hydronephrosis  STOMACH AND BOWEL:  1 4 cm air-fluid level structure identified medial to the 2nd portion of the duodenum possibly representing a duodenal diverticulum  Mild fullness to the 2nd portion of the duodenum best seen on the coronal view  APPENDIX:  No findings to suggest appendicitis  ABDOMINOPELVIC CAVITY:  No ascites or free intraperitoneal air  No lymphadenopathy  VESSELS:  Unremarkable for patient's age  PELVIS REPRODUCTIVE ORGANS:  Calcified uterine fibroids  URINARY BLADDER:  Unremarkable  ABDOMINAL WALL/INGUINAL REGIONS:  Unremarkable  OSSEOUS STRUCTURES:  No acute fracture or destructive osseous lesion  Impression: Suspect possible duodenitis   Fullness to the 2nd portion of the duodenum with 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 along the 2nd portion of the duodenum  With history of GI bleeding, consider direct visualization/endoscopy when clinically warranted to rule out pathology in this region  Workstation performed: [id number]         ** Please Note: Dragon 360 Dictation voice to text software was used in the creation of this document   **

## 2018-08-03 NOTE — ASSESSMENT & PLAN NOTE
Patient has chronic kidney disease seems to be stable at this point although appears to be dehydrated  Current creatinine may also be misleading due to patient's nutritional status  We will check patient's lactic acid every 2 hours as per protocol, give fluids very carefully  It does not appear that patient looks infected at this point  Will check urinalysis  Noted patient has WBCs elevated as with platelet count which may be a sign of acute phase reactant

## 2018-08-03 NOTE — ANESTHESIA PREPROCEDURE EVALUATION
Review of Systems/Medical History  Patient summary reviewed  Chart reviewed      Cardiovascular  Hyperlipidemia, Hypertension ,    Pulmonary  Negative pulmonary ROS        GI/Hepatic      Comment: ?GI bleed-Black stool-guaic positive     Chronic kidney disease stage 3,        Endo/Other  Diabetes (with neuropathy/Preop / A1C 7 6) Insulin, History of thyroid disease , hypothyroidism,   Obesity (BMI 38)  morbid obesity   GYN  Negative gynecology ROS          Hematology  Anemia (Hgb 7 2 (Baseline 9)) chronic blood loss anemia and iron deficiency anemia,    Comment: First blood culture positive for Staph aureus--ruled as contaminant Musculoskeletal    Comment: Stage 2 decubitus ulcer      Neurology      Comment: Ambulatory dysfunction Psychology   Negative psychology ROS              Physical Exam    Airway    Mallampati score: III  TM Distance: <3 FB       Dental   upper dentures and lower dentures,     Cardiovascular  Rhythm: regular,     Pulmonary  Breath sounds clear to auscultation,     Other Findings        Anesthesia Plan  ASA Score- 3 Emergent    Anesthesia Type- IV sedation with anesthesia with ASA Monitors  Additional Monitors:   Airway Plan:         Plan Factors-    Induction- intravenous  Postoperative Plan-     Informed Consent- Anesthetic plan and risks discussed with patient  I personally reviewed this patient with the CRNA  Discussed and agreed on the Anesthesia Plan with the CRNA  Dhaval Iyer

## 2018-08-03 NOTE — PROGRESS NOTES
Progress Note - Matias Kwan 1935, 81 y o  female MRN: [id number]    Unit/Bed#: [facility] 509-01 Encounter: [id number]    Primary Care Provider: Heather Berger MD   Date and time admitted to hospital: 8/2/2018  3:04 PM        Iron deficiency anemia due to chronic blood loss   Assessment & Plan    She needs iron treatment at home        GI bleed   Assessment & Plan    See above        CKD (chronic kidney disease) stage 3, GFR 30-59 ml/min   Assessment & Plan    Patient has chronic kidney disease seems to be stable at this point although appears to be dehydrated  Current creatinine may also be misleading due to patient's nutritional status  We will check patient's lactic acid every 2 hours as per protocol, give fluids very carefully  It does not appear that patient looks infected at this point  Will check urinalysis  Noted patient has WBCs elevated as with platelet count which may be a sign of acute phase reactant  Decubitus ulcer of buttock, stage 2   Assessment & Plan    Wound Care to see patient  Turn patient every 2 hours  Type 2 diabetes mellitus with diabetic polyneuropathy, without long-term current use of insulin [facility])   Assessment & Plan    Lab Results   Component Value Date    HGBA1C 7 6 (H) 08/02/2018       Recent Labs      08/02/18   2337  08/03/18   0533  08/03/18   1059  08/03/18   1705   POCGLU  423*  302*  257*  239*     We are concerned about possibility of GI bleed/melena  That said patient is placed NPO  Will check blood sugars every 6 hours for now  Cover with insulin sliding scale  Blood Sugar Average: Last 72 hrs:  (P) 318 4        * Acute blood loss anemia   Assessment & Plan    Possible acute blood loss anemia in the setting of ulcer disease  SHe was admitted with black stools, this morning her hemaglobin is 5 2, she received two units of blood    She went for endoscopy, Ulcer was noted and it has been treated with epinephrine  Continue monitoring cbc  protonix drip continue                        VTE Pharmacologic Prophylaxis:   Pharmacologic: Pharmacologic VTE Prophylaxis contraindicated due to gi bleeding  Mechanical VTE Prophylaxis in Place: Yes    Patient Centered Rounds: I have performed bedside rounds with nursing staff today  Discussions with Specialists or Other Care Team Provider: GI    Education and Discussions with Family / Patient: patient, son on the phone    Time Spent for Care: 45 minutes  More than 50% of total time spent on counseling and coordination of care as described above  Current Length of Stay: 1 day(s)    Current Patient Status: Inpatient   Certification Statement: The patient will continue to require additional inpatient hospital stay due to gi bleeding    Discharge Plan: needs to tolerate diet, monitor for any drop in hg    Code Status: Level 1 - Full Code      Subjective:   I am doing well  I have no pain, no dizziness or sob at this time    Objective:     Vitals:   Temp (24hrs), Av 7 °F (36 5 °C), Min:97 3 °F (36 3 °C), Max:98 2 °F (36 8 °C)    HR:  [61-95] 61  Resp:  [16-20] 19  BP: (102-152)/(54-80) 124/61  SpO2:  [94 %-99 %] 96 %  Body mass index is 38 78 kg/m²  Input and Output Summary (last 24 hours): Intake/Output Summary (Last 24 hours) at 18 1710  Last data filed at 18 1438   Gross per 24 hour   Intake          2720 25 ml   Output             1600 ml   Net          1120 25 ml       Physical Exam:     Physical Exam   Constitutional: She is oriented to person, place, and time  She appears well-developed and well-nourished  HENT:   Head: Normocephalic and atraumatic  Right Ear: External ear normal    Left Ear: External ear normal    Nose: Nose normal    Mouth/Throat: Oropharynx is clear and moist  No oropharyngeal exudate  Eyes: Conjunctivae are normal  Pupils are equal, round, and reactive to light  Right eye exhibits no discharge  Left eye exhibits no discharge  No scleral icterus     Neck: Normal range of motion  Neck supple  No JVD present  No tracheal deviation present  No thyromegaly present  Cardiovascular: Normal rate, regular rhythm, normal heart sounds and intact distal pulses  Exam reveals no gallop and no friction rub  No murmur heard  Pulmonary/Chest: Effort normal and breath sounds normal  No stridor  No respiratory distress  She has no wheezes  She has no rales  She exhibits no tenderness  Abdominal: Soft  Bowel sounds are normal  She exhibits no distension and no mass  There is no tenderness  There is no rebound and no guarding  Musculoskeletal: Normal range of motion  She exhibits no tenderness  Lymphadenopathy:     She has no cervical adenopathy  Neurological: She is alert and oriented to person, place, and time  She has normal reflexes  She displays normal reflexes  No cranial nerve deficit  She exhibits normal muscle tone  Coordination normal    Skin: Skin is warm and dry  No rash noted  No erythema  No pallor  Psychiatric: She has a normal mood and affect  Her behavior is normal  Judgment and thought content normal    Nursing note and vitals reviewed  Additional Data:     Labs:      Results from last 7 days  Lab Units 08/03/18  0518   WBC Thousand/uL 13 00*   HEMOGLOBIN g/dL 5 8*   HEMATOCRIT % 18 6*   PLATELETS Thousands/uL 553*   LYMPHO PCT % 10*   MONO PCT MAN % 1*   EOSINO PCT MANUAL % 0       Results from last 7 days  Lab Units 08/03/18  0518 08/02/18  1604   SODIUM mmol/L 139 134*   POTASSIUM mmol/L 4 3 4 7   CHLORIDE mmol/L 110* 103   CO2 mmol/L 22 23   BUN mg/dL 48* 55*   CREATININE mg/dL 1 16 1 43*   CALCIUM mg/dL 8 0* 8 9   TOTAL PROTEIN g/dL  --  6 5   BILIRUBIN TOTAL mg/dL  --  0 35   ALK PHOS U/L  --  109   ALT U/L  --  32   AST U/L  --  30   GLUCOSE RANDOM mg/dL 252* 317*       Results from last 7 days  Lab Units 08/02/18  1604   INR  1 23*       * I Have Reviewed All Lab Data Listed Above  * Additional Pertinent Lab Tests Reviewed:  All Labs For Current Hospital Admission Reviewed    Imaging:    Imaging Reports Reviewed Today Include:    Imaging Personally Reviewed by Myself Includes:       Recent Cultures (last 7 days):           Last 24 Hours Medication List:     Current Facility-Administered Medications:  bimatoprost 1 drop Both Eyes HS Jadon Ricks MD    cholecalciferol 2,000 Units Oral Daily Jadon Ricks MD    docusate sodium 100 mg Oral BID Jadon Ricks MD    dorzolamide-timolol 1 drop Both Eyes BID Jadon Ricks MD    HYDROcodone-acetaminophen 1 tablet Oral BID PRN Jadon Ricks MD    insulin glargine 6 Units Subcutaneous HS Jadon Ricks MD    insulin lispro 1-6 Units Subcutaneous TID With Meals Lisa Barrera PA-C    levothyroxine 75 mcg Oral Daily Jadon Ricks MD    metoprolol tartrate 25 mg Oral Q12H [address] Jadon Ricks MD    nystatin  Topical BID Jadon Ricks MD    ondansetron 4 mg Intravenous Q6H PRN Jadon Ricks MD    pantoprozole (PROTONIX) infusion (Continuous) 8 mg/hr Intravenous Continuous Jadon Ricks MD Last Rate: 8 mg/hr (08/03/18 5893)   polyethylene glycol 17 g Oral Daily Jadon Ricks MD    pravastatin 40 mg Oral Daily With Mayank Eli MD    prednisoLONE acetate 1 drop Both Eyes BID Jadon Ricks MD    sodium chloride 75 mL/hr Intravenous Continuous Jadon Ricks MD Last Rate: Stopped (08/03/18 1145)   sodium phosphate 6 mmol Intravenous Once Aracelis Dick MD Last Rate: 6 mmol (08/03/18 3685)        Today, Patient Was Seen By: Aracelis Dick MD    ** Please Note: Dictation voice to text software may have been used in the creation of this document   **

## 2018-08-03 NOTE — ASSESSMENT & PLAN NOTE
Lab Results   Component Value Date    HGBA1C 7 6 (H) 08/02/2018       Recent Labs      08/02/18   2337  08/03/18   0533  08/03/18   1059  08/03/18   1705   POCGLU  423*  302*  257*  239*     We are concerned about possibility of GI bleed/melena  That said patient is placed NPO  Will check blood sugars every 6 hours for now  Cover with insulin sliding scale    Blood Sugar Average: Last 72 hrs:  (P) 318 4

## 2018-08-03 NOTE — CONSULTS
Consult Note - Wound   Home Gold 81 y o  female MRN: [id number]  Unit/Bed#: [facility] 509-01 Encounter: [id number]      History and Present Illness:  Patient is an 81year old female resident of skilled nursing facility now admitted to [facility] with dropping hemoglobin level/anemia  Wound care attempted to see patient this morning with consult for POA pressure injuries however unable to see  Patient in GI lab since this morning, per primary RN Jim Files she is indeed admitted with stage 2 pressure injury to sacrum and she is grossly incontinent of stool  As per discussion with RN, if patient is not back by 1430 pm, we will place preliminary wound/skin care orders keeping in mind incontinent status and mobility  Current Jon score is 13 with places patient on high risk for breakdown with already existing wound  We will place orders and seen patient Monday  Skin care plans:  1-Calazime to sacrum, buttocks TID and PRN  2-Hydraguard to bilateral heel BID and PRN  3-Elevate heels to offload pressure  4-Soft care cushion when out of bed  5-Turn/repoisiton q2h or when medically stable for pressure re-distribution on skin  6-Moisturize skin daily with skin nourishing cream      Vitals: Blood pressure 129/60, pulse 87, temperature 97 8 °F (36 6 °C), temperature source Oral, resp  rate 18, height 5' 2" (1 575 m), weight 96 2 kg (212 lb), SpO2 96 %  ,Body mass index is 38 78 kg/m²  Our skin care recommendations were placed as orders, please call wound care to ext [phone] with questions or concerns  We will evaluate Monday for detail assessment of skin, please note patient admitted with POA wounds        Ayo Thompson, RN, BSN, [facility]

## 2018-08-03 NOTE — ASSESSMENT & PLAN NOTE
Anemia seems to be new onset  The patient already had an iron profile done quite recently and was shown to be iron deficient  Questionable GI bleeding  Questionable melanotic stool and heme-positive from below  GI consult  NPO  Started on Protonix  Hemoglobin and hematocrit every 8 hours for 3 occurrences  Type and screen  Meanwhile patient has elevated lactic acid may be secondary to acute kidney insufficiency on top of CKD; would check lactic acid with fluids  We anticipate hemoglobin to go down further therefore blood transfusion consent in chart  Put on hold aspirin

## 2018-08-03 NOTE — ASSESSMENT & PLAN NOTE
Possible acute blood loss anemia in the setting of ulcer disease  SHe was admitted with black stools, this morning her hemaglobin is 5 2, she received two units of blood    She went for endoscopy, Ulcer was noted and it has been treated with epinephrine  Continue monitoring cbc  protonix drip continue

## 2018-08-03 NOTE — ANESTHESIA POSTPROCEDURE EVALUATION
Post-Op Assessment Note      CV Status:  Stable    Mental Status:  Alert and awake    Hydration Status:  Euvolemic    PONV Controlled:  Controlled    Airway Patency:  Patent    Post Op Vitals Reviewed: Yes          Staff: CRNA           /80 (08/03/18 1207)    Temp      Pulse 73 (08/03/18 1207)   Resp 18 (08/03/18 1207)    SpO2 98 % (08/03/18 1207)

## 2018-08-03 NOTE — OP NOTE
**** GI/ENDOSCOPY REPORT ****     PATIENT NAME: Elva Dey - VISIT ID:  Patient ID: [id number]   YOB: 1935     INTRODUCTION: Esophagogastroduodenoscopy - A 81 female patient presents   for an inpatient Esophagogastroduodenoscopy at [address] [place] [address]  INDICATIONS: Bleeding (melena)  CONSENT: The benefits, risks, and alternatives to the procedure were   discussed and informed consent was obtained from the patient  PREPARATION:  EKG, pulse, pulse oximetry and blood pressure were monitored   throughout the procedure  ASA Classification: Class 3 - Patient has severe   systemic disturbance that may or may not be related to the disorder   requiring surgery  MEDICATIONS: See anesthesia report  PROCEDURE:  The endoscope was passed without difficulty through the mouth   under direct visualization and advanced to the 2nd portion of the   duodenum  The scope was withdrawn and the mucosa was carefully examined  FINDINGS:   Esophagus: There was a small hiatus hernia visible in the   esophagus  Stomach: A few small polyps was found in the stomach  Duodenum: There was a single ulcer in the duodenal bulb  It showed a loose   clot, after clot was removed a clean base was seen  There was a single   ulcer in the 2nd portion of the duodenum  It showed an adherent clot  Clot   was removed and ulcer was injected with 4cc 1:10,000 epinephrine and 2   clips were deployed, no bleeding after clipping  COMPLICATIONS: There were no complications  IMPRESSIONS: A hiatus hernia found  Gastric polyps  Biopsy was not   obtained due to the recent bleeding  An ulcer found in the duodenal bulb  An ulcer with visible vessel found in the 2nd portion of the duodenum  RECOMMENDATIONS: Return to floor  Continue current medications  Protonix   IV  Follow-up on the results of the biopsy specimens  Start clear-liquid   diet       ESTIMATED BLOOD LOSS:     PATHOLOGY SPECIMENS:     PROCEDURE CODES:     ICD-9 Codes: 459 0 Hemorrhage, unspecified 553 3 Diaphragmatic hernia   without mention of obstruction or gangrene 211 1 Benign neoplasm of stomach     ICD-10 Codes: K92 2 Gastrointestinal hemorrhage, unspecified K44   Diaphragmatic hernia Y61 1 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

## 2018-08-03 NOTE — DISCHARGE INSTR - OTHER ORDERS
Skin care plans:  1-Cleanse sacral and L buttock wound with soap and water, skin prep to periwound, calcium alginate to wound bed the cover with hydrocolloid  change q3d   2-Hydraguard to bilateral heel BID and PRN  3-Elevate heels to offload pressure  4-Soft care cushion when out of bed  5-Turn/repoisiton q2h or when medically stable for pressure re-distribution on skin    6-Moisturize skin daily with skin nourishing cream

## 2018-08-03 NOTE — CONSULTS
Consultation - GI   Home Gold 81 y o  female MRN: [id number]  Unit/Bed#: [facility] 509-01 Encounter: [id number]      Assessment/Plan     Assessment/Plan:    Anemia  -Likely 2/2 upper GI bleed in light of melena and CT scan suggestive of duodenitis  -Pt s/p EGD - duodenal bulb ulcer with clot and clean base, ulcer with visible vessel found in 2nd portion of duodenum (injected with epi and clipped), gastric polyps, hiatal hernia  -Hb decreased over night from 7 7 -> 5 8  Check hemoglobin q8 hrs  -Continue IV protonix  Consider switching to bid dosing  -2 units of PRBCs ordered  -Continue to hold aspirin  -Continue to monitor    CKD Stage III  -Management per the primary team  -Current Cr 1 16  -Lactic acid trending down 2 5 -> 1 3    Leukocytosis  -WBC count 17 79 on admission  Trending down to 13 00  Possible acute phase reactant  -Continue to trend CBC    Decubitis ulcer of buttock  -Currently covered by dressing  Wound care consulted  -Per nursing there has been very minimal bleeding from the site so this is likely not the cause of her anemia  -Continue to monitor for signs of bleeding     History of Present Illness   Physician Requesting Consult: Ousmane Syed MD  Reason for Consult / Principal Problem: Anemia with suspected GI bleed  Hx and PE limited by: Mental status (Questionable dementia)  HPI: Home Gold is a 81y o  year old female with a past medical history of type 2 diabetes, hypertension, hyperlipidemia, chronic kidney disease stage 3, who [facility]  Patient was recently admitted at the [facility] from July 20th to July 27th for anemia that was thought to be secondary to bleeding from a sacral decubitus ulcer  Patient was found to have some minor bleeding and oozing from the ulcer and was found have a hemoglobin of 8 0  The patient's hemoglobin stabilized at 9 2 without receiving any blood and she was discharged back to the nursing home    She returned to the [facility] ED on August 1st with hyperglycemia with a documented blood glucose of over 600 measured at the nursing home  At this visit her hemoglobin was found to be 7 7  She was transfused 1 unit of PRBCs, given insulin, and discharged home again  Yesterday at the nursing home the patient's hemoglobin was measured to be low around 7 and she was sent back to the ED here at [facility] for further evaluation  In the ED the pt was unable to provide much additional history and was unsure why she was here in the hospital  Today on questioning the pt denies any prior history of GI bleed and states that she has never had any endoscopy or colonoscopy  She denies any recent melena, hematochezia, or hematemesis  Per nursing she has not had any bowel movements since being admitted  A bedside rectal exam showed dark brown stool and hemeoccult was sent  The pt states the she feels fine and she denies abdominal pain, fever, chills, chest pain, shortness of breath, nausea, and vomiting  She takes aspirin at the nursing home but says she "doesn't take it all the time" and denies taking any other blood thinners  Inpatient consult to gastroenterology     Performed by  Meghna Reis by Cecelia Scott              Review of Systems   Constitutional: Negative for chills and fever  Respiratory: Negative for shortness of breath and wheezing  Cardiovascular: Negative for chest pain and palpitations  Gastrointestinal: Negative for abdominal pain, blood in stool, constipation, diarrhea and nausea  Genitourinary: Negative for dysuria and frequency  Skin: Negative for pallor and rash  Neurological: Negative for dizziness, weakness and light-headedness  Historical Information   Past Medical History:   Diagnosis Date    Diabetes mellitus ([facility] [address] )     Type 2    Hyperlipidemia     Hypertension      History reviewed  No pertinent surgical history    Social History   History Alcohol Use No     History   Drug Use No     History   Smoking Status    Never Smoker   Smokeless Tobacco    Never Used     Family History: History reviewed  No pertinent family history      Meds/Allergies   current meds:   Current Facility-Administered Medications   Medication Dose Route Frequency    bimatoprost (LUMIGAN) 0 01 % ophthalmic solution 1 drop  1 drop Both Eyes HS    cholecalciferol (VITAMIN D3) tablet 2,000 Units  2,000 Units Oral Daily    docusate sodium (COLACE) capsule 100 mg  100 mg Oral BID    dorzolamide-timolol (COSOPT) 22 3-6 8 MG/ML ophthalmic solution 1 drop  1 drop Both Eyes BID    HYDROcodone-acetaminophen (NORCO) 5-325 mg per tablet 1 tablet  1 tablet Oral BID PRN    insulin glargine (LANTUS) subcutaneous injection 6 Units 0 06 mL  6 Units Subcutaneous HS    insulin lispro (HumaLOG) 100 units/mL subcutaneous injection 1-6 Units  1-6 Units Subcutaneous TID With Meals    levothyroxine tablet 75 mcg  75 mcg Oral Daily    metoprolol tartrate (LOPRESSOR) tablet 25 mg  25 mg Oral Q12H SABRINA    nystatin (MYCOSTATIN) powder   Topical BID    ondansetron (ZOFRAN) injection 4 mg  4 mg Intravenous Q6H PRN    pantoprazole (PROTONIX) 80 mg in sodium chloride 0 9 % 100 mL infusion  8 mg/hr Intravenous Continuous    polyethylene glycol (MIRALAX) packet 17 g  17 g Oral Daily    pravastatin (PRAVACHOL) tablet 40 mg  40 mg Oral Daily With Dinner    prednisoLONE acetate (PRED FORTE) 1 % ophthalmic suspension 1 drop  1 drop Both Eyes BID    sodium chloride 0 9 % infusion  75 mL/hr Intravenous Continuous    sodium phosphate 6 mmol in sodium chloride 0 9 % 100 mL Infusion  6 mmol Intravenous Once       No Known Allergies    Objective       Intake/Output Summary (Last 24 hours) at 08/03/18 1007  Last data filed at 08/03/18 0832   Gross per 24 hour   Intake          2000 25 ml   Output              900 ml   Net          1100 25 ml       Invasive Devices:   Peripheral IV 08/02/18 Right Forearm (Active)   Site Assessment Clean;Dry; Intact 8/2/2018 11:00 PM   Dressing Type Transparent 8/2/2018 11:00 PM   Line Status Blood return noted; Flushed; Infusing 8/2/2018 11:00 PM   Dressing Status Clean; Intact;Dry 8/2/2018 11:00 PM   Dressing Change Due 08/06/18 8/2/2018 11:00 PM   Reason Not Rotated Not due 8/2/2018 11:00 PM       Peripheral IV 08/03/18 Left Arm (Active)   Site Assessment Clean;Dry; Intact 8/3/2018  7:00 AM   Dressing Type Transparent;Securing device 8/3/2018  7:00 AM   Line Status Blood return noted; Flushed;Saline locked 8/3/2018  7:00 AM   Dressing Status Clean;Dry; Intact 8/3/2018  7:00 AM   Dressing Change Due 08/07/18 8/3/2018  7:00 AM   Reason Not Rotated Not due 8/3/2018  7:00 AM       Physical Exam   Constitutional: She is oriented to person, place, and time  No distress  Pt lying comfortably in bed   HENT:   Head: Normocephalic and atraumatic  Eyes: No scleral icterus  Cardiovascular: Normal rate, regular rhythm, normal heart sounds and intact distal pulses  No murmur heard  Pulmonary/Chest: Effort normal  She has no wheezes  She has no rales  Abdominal: Soft  Bowel sounds are normal  She exhibits no distension  There is no tenderness  Genitourinary:   Genitourinary Comments: Dark brown stool in the rectal vault  Possible rectal skin tag  No internal masses palpated   Neurological: She is alert and oriented to person, place, and time  Skin: No rash noted  She is not diaphoretic  No erythema  Psychiatric:   Pt has poor recall  Is unsure why she is in the hospital       Lab Results: I have personally reviewed pertinent reports  Imaging Studies: I have personally reviewed pertinent reports        VTE Prophylaxis: RX contraindicated due to: possible GI bleed     [facility]  PGY-1 Internal Medicine

## 2018-08-03 NOTE — PROGRESS NOTES
Rounds with EDUARDO (Dr Elise Liu), to receive 2 UPCs for Hgb 5 8, GI consult with probable EGD later today

## 2018-08-04 LAB
ABO GROUP BLD BPU: NORMAL
ABO GROUP BLD BPU: NORMAL
ANION GAP SERPL CALCULATED.3IONS-SCNC: 5 MMOL/L (ref 4–13)
ANISOCYTOSIS BLD QL SMEAR: PRESENT
BASOPHILS # BLD MANUAL: 0.1 THOUSAND/UL (ref 0–0.1)
BASOPHILS NFR MAR MANUAL: 1 % (ref 0–1)
BPU ID: NORMAL
BPU ID: NORMAL
BUN SERPL-MCNC: 40 MG/DL (ref 5–25)
BURR CELLS BLD QL SMEAR: PRESENT
CALCIUM SERPL-MCNC: 8.4 MG/DL (ref 8.3–10.1)
CHLORIDE SERPL-SCNC: 114 MMOL/L (ref 100–108)
CO2 SERPL-SCNC: 22 MMOL/L (ref 21–32)
CREAT SERPL-MCNC: 1.22 MG/DL (ref 0.6–1.3)
EOSINOPHIL # BLD MANUAL: 0.41 THOUSAND/UL (ref 0–0.4)
EOSINOPHIL NFR BLD MANUAL: 4 % (ref 0–6)
ERYTHROCYTE [DISTWIDTH] IN BLOOD BY AUTOMATED COUNT: 15.8 % (ref 11.6–15.1)
GFR SERPL CREATININE-BSD FRML MDRD: 41 ML/MIN/1.73SQ M
GLUCOSE SERPL-MCNC: 143 MG/DL (ref 65–140)
GLUCOSE SERPL-MCNC: 176 MG/DL (ref 65–140)
GLUCOSE SERPL-MCNC: 192 MG/DL (ref 65–140)
GLUCOSE SERPL-MCNC: 205 MG/DL (ref 65–140)
GLUCOSE SERPL-MCNC: 231 MG/DL (ref 65–140)
HCT VFR BLD AUTO: 27 % (ref 34.8–46.1)
HGB BLD-MCNC: 8.4 G/DL (ref 11.5–15.4)
LYMPHOCYTES # BLD AUTO: 1.03 THOUSAND/UL (ref 0.6–4.47)
LYMPHOCYTES # BLD AUTO: 10 % (ref 14–44)
MCH RBC QN AUTO: 29 PG (ref 26.8–34.3)
MCHC RBC AUTO-ENTMCNC: 31.1 G/DL (ref 31.4–37.4)
MCV RBC AUTO: 93 FL (ref 82–98)
METAMYELOCYTES NFR BLD MANUAL: 1 % (ref 0–1)
MONOCYTES # BLD AUTO: 0.31 THOUSAND/UL (ref 0–1.22)
MONOCYTES NFR BLD: 3 % (ref 4–12)
MRSA NOSE QL CULT: NORMAL
NEUTROPHILS # BLD MANUAL: 8.38 THOUSAND/UL (ref 1.85–7.62)
NEUTS SEG NFR BLD AUTO: 81 % (ref 43–75)
NRBC BLD AUTO-RTO: 0 /100 WBCS
OVALOCYTES BLD QL SMEAR: PRESENT
PLATELET # BLD AUTO: 561 THOUSANDS/UL (ref 149–390)
PLATELET BLD QL SMEAR: ABNORMAL
PMV BLD AUTO: 9.8 FL (ref 8.9–12.7)
POIKILOCYTOSIS BLD QL SMEAR: PRESENT
POLYCHROMASIA BLD QL SMEAR: PRESENT
POTASSIUM SERPL-SCNC: 4.4 MMOL/L (ref 3.5–5.3)
RBC # BLD AUTO: 2.9 MILLION/UL (ref 3.81–5.12)
RBC MORPH BLD: PRESENT
SODIUM SERPL-SCNC: 141 MMOL/L (ref 136–145)
UNIT DISPENSE STATUS: NORMAL
UNIT DISPENSE STATUS: NORMAL
UNIT PRODUCT CODE: NORMAL
UNIT PRODUCT CODE: NORMAL
UNIT RH: NORMAL
UNIT RH: NORMAL
WBC # BLD AUTO: 10.34 THOUSAND/UL (ref 4.31–10.16)

## 2018-08-04 PROCEDURE — 99232 SBSQ HOSP IP/OBS MODERATE 35: CPT | Performed by: INTERNAL MEDICINE

## 2018-08-04 PROCEDURE — 82948 REAGENT STRIP/BLOOD GLUCOSE: CPT

## 2018-08-04 PROCEDURE — C9113 INJ PANTOPRAZOLE SODIUM, VIA: HCPCS | Performed by: INTERNAL MEDICINE

## 2018-08-04 PROCEDURE — 80048 BASIC METABOLIC PNL TOTAL CA: CPT | Performed by: INTERNAL MEDICINE

## 2018-08-04 PROCEDURE — 85027 COMPLETE CBC AUTOMATED: CPT | Performed by: INTERNAL MEDICINE

## 2018-08-04 PROCEDURE — 85007 BL SMEAR W/DIFF WBC COUNT: CPT | Performed by: INTERNAL MEDICINE

## 2018-08-04 PROCEDURE — C9113 INJ PANTOPRAZOLE SODIUM, VIA: HCPCS | Performed by: PHYSICIAN ASSISTANT

## 2018-08-04 RX ORDER — PANTOPRAZOLE SODIUM 40 MG/1
40 INJECTION, POWDER, FOR SOLUTION INTRAVENOUS EVERY 12 HOURS SCHEDULED
Status: DISCONTINUED | OUTPATIENT
Start: 2018-08-04 | End: 2018-08-10 | Stop reason: HOSPADM

## 2018-08-04 RX ADMIN — LEVOTHYROXINE SODIUM 75 MCG: 75 TABLET ORAL at 05:01

## 2018-08-04 RX ADMIN — NYSTATIN: 100000 POWDER TOPICAL at 17:01

## 2018-08-04 RX ADMIN — SODIUM CHLORIDE 8 MG/HR: 9 INJECTION, SOLUTION INTRAVENOUS at 11:20

## 2018-08-04 RX ADMIN — DORZOLAMIDE HYDROCHLORIDE AND TIMOLOL MALEATE 1 DROP: 20; 5 SOLUTION/ DROPS OPHTHALMIC at 17:00

## 2018-08-04 RX ADMIN — PREDNISOLONE ACETATE 1 DROP: 10 SUSPENSION/ DROPS OPHTHALMIC at 17:00

## 2018-08-04 RX ADMIN — SODIUM CHLORIDE 75 ML/HR: 0.9 INJECTION, SOLUTION INTRAVENOUS at 08:55

## 2018-08-04 RX ADMIN — INSULIN LISPRO 1 UNITS: 100 INJECTION, SOLUTION INTRAVENOUS; SUBCUTANEOUS at 08:41

## 2018-08-04 RX ADMIN — METOPROLOL TARTRATE 25 MG: 25 TABLET ORAL at 08:48

## 2018-08-04 RX ADMIN — BIMATOPROST 1 DROP: 0.1 SOLUTION/ DROPS OPHTHALMIC at 21:24

## 2018-08-04 RX ADMIN — PANTOPRAZOLE SODIUM 40 MG: 40 INJECTION, POWDER, FOR SOLUTION INTRAVENOUS at 21:24

## 2018-08-04 RX ADMIN — INSULIN LISPRO 2 UNITS: 100 INJECTION, SOLUTION INTRAVENOUS; SUBCUTANEOUS at 11:33

## 2018-08-04 RX ADMIN — PRAVASTATIN SODIUM 40 MG: 40 TABLET ORAL at 17:00

## 2018-08-04 RX ADMIN — INSULIN LISPRO 5 UNITS: 100 INJECTION, SOLUTION INTRAVENOUS; SUBCUTANEOUS at 17:00

## 2018-08-04 RX ADMIN — METOPROLOL TARTRATE 25 MG: 25 TABLET ORAL at 21:24

## 2018-08-04 RX ADMIN — INSULIN GLARGINE 15 UNITS: 100 INJECTION, SOLUTION SUBCUTANEOUS at 21:24

## 2018-08-04 RX ADMIN — SODIUM CHLORIDE 75 ML/HR: 0.9 INJECTION, SOLUTION INTRAVENOUS at 21:38

## 2018-08-04 RX ADMIN — INSULIN LISPRO 1 UNITS: 100 INJECTION, SOLUTION INTRAVENOUS; SUBCUTANEOUS at 17:00

## 2018-08-04 RX ADMIN — PREDNISOLONE ACETATE 1 DROP: 10 SUSPENSION/ DROPS OPHTHALMIC at 08:41

## 2018-08-04 RX ADMIN — DORZOLAMIDE HYDROCHLORIDE AND TIMOLOL MALEATE 1 DROP: 20; 5 SOLUTION/ DROPS OPHTHALMIC at 08:40

## 2018-08-04 RX ADMIN — VITAMIN D, TAB 1000IU (100/BT) 2000 UNITS: 25 TAB at 08:48

## 2018-08-04 RX ADMIN — INSULIN LISPRO 5 UNITS: 100 INJECTION, SOLUTION INTRAVENOUS; SUBCUTANEOUS at 11:34

## 2018-08-04 RX ADMIN — NYSTATIN: 100000 POWDER TOPICAL at 08:42

## 2018-08-04 NOTE — ASSESSMENT & PLAN NOTE
Lab Results   Component Value Date    HGBA1C 7 6 (H) 08/02/2018       Recent Labs      08/03/18   1705  08/03/18   2335  08/04/18   0603  08/04/18   1109   POCGLU  239*  151*  192*  231*     We are concerned about possibility of GI bleed/melena  That said patient is placed NPO  Will check blood sugars every 6 hours for now  Cover with insulin sliding scale    Blood Sugar Average: Last 72 hrs:  (P) 153 62

## 2018-08-04 NOTE — PLAN OF CARE
Problem: DISCHARGE PLANNING - CARE MANAGEMENT  Goal: Discharge to post-acute care or home with appropriate resources  INTERVENTIONS:  - Conduct assessment to determine patient/family and health care team treatment goals, and need for post-acute services based on payer coverage, community resources, and patient preferences, and barriers to discharge  - Address psychosocial, clinical, and financial barriers to discharge as identified in assessment in conjunction with the patient/family and health care team  - Arrange appropriate level of post-acute services according to patient's   needs and preference and payer coverage in collaboration with the physician and health care team  - Communicate with and update the patient/family, physician, and health care team regarding progress on the discharge plan  - Arrange appropriate transportation to post-acute venues   INTERVENTIONS:  - Conduct assessment to determine patient/family and health care team treatment goals, and need for post-acute services based on payer coverage, community resources, and patient preferences, and barriers to discharge  - Address psychosocial, clinical, and financial barriers to discharge as identified in assessment in conjunction with the patient/family and health care team  - Arrange appropriate level of post-acute services according to patient's   needs and preference and payer coverage in collaboration with the physician and health care team  - Communicate with and update the patient/family, physician, and health care team regarding progress on the discharge plan  - Arrange appropriate transportation to post-acute venues  - Complete referral to [facility] snf to resume rehab and obtain insurance authorization    Outcome: Progressing

## 2018-08-04 NOTE — PROGRESS NOTES
Progress Note - Xiomara Dunlap 1935, 81 y o  female MRN: [id number]    Unit/Bed#: [facility] 509-01 Encounter: [id number]    Primary Care Provider: Naty Hull MD   Date and time admitted to hospital: 8/2/2018  3:04 PM        Iron deficiency anemia due to chronic blood loss   Assessment & Plan    She needs iron treatment at home        GI bleed   Assessment & Plan    See above        CKD (chronic kidney disease) stage 3, GFR 30-59 ml/min   Assessment & Plan    Patient has chronic kidney disease seems to be stable at this point although appears to be dehydrated  Current creatinine may also be misleading due to patient's nutritional status  We will check patient's lactic acid every 2 hours as per protocol, give fluids very carefully  It does not appear that patient looks infected at this point  Will check urinalysis  Noted patient has WBCs elevated as with platelet count which may be a sign of acute phase reactant  Decubitus ulcer of buttock, stage 2   Assessment & Plan    Wound Care to see patient  Turn patient every 2 hours  Type 2 diabetes mellitus with diabetic polyneuropathy, without long-term current use of insulin [facility])   Assessment & Plan    Lab Results   Component Value Date    HGBA1C 7 6 (H) 08/02/2018       Recent Labs      08/03/18   1705  08/03/18   2335  08/04/18   0603  08/04/18   1109   POCGLU  239*  151*  192*  231*     We are concerned about possibility of GI bleed/melena  That said patient is placed NPO  Will check blood sugars every 6 hours for now  Cover with insulin sliding scale  Blood Sugar Average: Last 72 hrs:  (P) 270 75        * Acute blood loss anemia   Assessment & Plan    Possible acute blood loss anemia in the setting of ulcer disease   SHe was admitted with black stools, this morning her hemaglobin is 5 2, she received two units of blood went up to 8 4  She went for endoscopy, Ulcer was noted and it has been treated with epinephrine  Continue monitoring cbc  protonix drip discontinued to protonix bid po                     VTE Pharmacologic Prophylaxis:   Pharmacologic: Heparin  Mechanical VTE Prophylaxis in Place: Yes    Patient Centered Rounds: I have performed bedside rounds with nursing staff today  Discussions with Specialists or Other Care Team Provider:  cardiology    Education and Discussions with Family / Patient: patient    Time Spent for Care: 45 minutes  More than 50% of total time spent on counseling and coordination of care as described above  Current Length of Stay: 2 day(s)    Current Patient Status: Inpatient   Certification Statement: The patient will continue to require additional inpatient hospital stay due to diuresis    Discharge Plan: tomorrow      Code Status: Level 1 - Full Code      Subjective:   I am doing well  I feel better  Objective:     Vitals:   Temp (24hrs), Av °F (36 7 °C), Min:97 4 °F (36 3 °C), Max:98 6 °F (37 °C)    HR:  [61-75] 75  Resp:  [18-22] 22  BP: (105-129)/(51-61) 129/54  SpO2:  [96 %-98 %] 98 %  Body mass index is 39 32 kg/m²  Input and Output Summary (last 24 hours): Intake/Output Summary (Last 24 hours) at 18 1448  Last data filed at 18 1300   Gross per 24 hour   Intake          1665 59 ml   Output              680 ml   Net           985 59 ml       Physical Exam:     Physical Exam   Constitutional: She appears well-developed and well-nourished  HENT:   Head: Normocephalic and atraumatic  Right Ear: External ear normal    Left Ear: External ear normal    Nose: Nose normal    Mouth/Throat: Oropharynx is clear and moist  No oropharyngeal exudate  Eyes: Conjunctivae and EOM are normal  Pupils are equal, round, and reactive to light  Right eye exhibits no discharge  Left eye exhibits no discharge  No scleral icterus  Neck: Normal range of motion  Neck supple  No JVD present  No tracheal deviation present  No thyromegaly present     Cardiovascular: Normal rate, regular rhythm and intact distal pulses  Exam reveals no gallop and no friction rub  No murmur heard  Pulmonary/Chest: Effort normal and breath sounds normal  No stridor  No respiratory distress  She has no wheezes  She has no rales  She exhibits no tenderness  Abdominal: Bowel sounds are normal  She exhibits distension  She exhibits no mass  There is no tenderness  There is no rebound and no guarding  Musculoskeletal: Normal range of motion  She exhibits no edema, tenderness or deformity  Lymphadenopathy:     She has no cervical adenopathy  Neurological: She is alert  She has normal reflexes  She displays normal reflexes  No cranial nerve deficit  She exhibits abnormal muscle tone  Coordination normal    Skin: Skin is warm and dry  No rash noted  No erythema  No pallor  Psychiatric: She has a normal mood and affect  Her behavior is normal  Judgment and thought content normal    Nursing note and vitals reviewed  Additional Data:     Labs:      Results from last 7 days  Lab Units 08/04/18  0450   WBC Thousand/uL 10 34*   HEMOGLOBIN g/dL 8 4*   HEMATOCRIT % 27 0*   PLATELETS Thousands/uL 561*   LYMPHO PCT % 10*   MONO PCT MAN % 3*   EOSINO PCT MANUAL % 4       Results from last 7 days  Lab Units 08/04/18  0450  08/02/18  1604   SODIUM mmol/L 141  < > 134*   POTASSIUM mmol/L 4 4  < > 4 7   CHLORIDE mmol/L 114*  < > 103   CO2 mmol/L 22  < > 23   BUN mg/dL 40*  < > 55*   CREATININE mg/dL 1 22  < > 1 43*   CALCIUM mg/dL 8 4  < > 8 9   TOTAL PROTEIN g/dL  --   --  6 5   BILIRUBIN TOTAL mg/dL  --   --  0 35   ALK PHOS U/L  --   --  109   ALT U/L  --   --  32   AST U/L  --   --  30   GLUCOSE RANDOM mg/dL 143*  < > 317*   < > = values in this interval not displayed  Results from last 7 days  Lab Units 08/02/18  1604   INR  1 23*       * I Have Reviewed All Lab Data Listed Above  * Additional Pertinent Lab Tests Reviewed:  Dominique 66 Admission Reviewed    Imaging:    Imaging Reports Reviewed Today Include:    Imaging Personally Reviewed by Myself Includes:      Recent Cultures (last 7 days):           Last 24 Hours Medication List:     Current Facility-Administered Medications:  bimatoprost 1 drop Both Eyes HS Luis Melissa MD    cholecalciferol 2,000 Units Oral Daily Luis Melissa MD    docusate sodium 100 mg Oral BID Luis Melissa MD    dorzolamide-timolol 1 drop Both Eyes BID Luis Melissa MD    HYDROcodone-acetaminophen 1 tablet Oral BID PRN Luis Melissa MD    insulin glargine 15 Units Subcutaneous HS Keenan Pfeiffer MD    insulin lispro 1-6 Units Subcutaneous TID With Meals Lisa Barrera PA-C    insulin lispro 5 Units Subcutaneous TID With Meals Keenan Pfeiffer MD    levothyroxine 75 mcg Oral Daily Luis Melissa MD    metoprolol tartrate 25 mg Oral Q12H [address] Luis Melissa MD    nystatin  Topical BID Luis Melissa MD    ondansetron 4 mg Intravenous Q6H PRN Luis Melissa MD    pantoprazole 40 mg Intravenous Q12H [address] DENNISE [facility]    polyethylene glycol 17 g Oral Daily Luis Melissa MD    pravastatin 40 mg Oral Daily With Baron Carol Ann MD    prednisoLONE acetate 1 drop Both Eyes BID Luis Melissa MD    sodium chloride 75 mL/hr Intravenous Continuous Luis Melissa MD Last Rate: 75 mL/hr (08/04/18 0855)        Today, Patient Was Seen By: Keenan Pfeiffer MD    ** Please Note: Dictation voice to text software may have been used in the creation of this document   **

## 2018-08-04 NOTE — ASSESSMENT & PLAN NOTE
Possible acute blood loss anemia in the setting of ulcer disease   SHe was admitted with black stools, this morning her hemaglobin is 5 2, she received two units of blood went up to 8 4  She went for endoscopy, Ulcer was noted and it has been treated with epinephrine  Continue monitoring cbc  protonix drip discontinued to protonix bid po

## 2018-08-04 NOTE — SOCIAL WORK
Met with patient and her daughter, Amari Garcia cell # [phone] to complete assessment and explain role of CM  Patient normally lives in an apt attached to son's home and was independent with ADL's and ambulation with wheeled walker  She has been at [address] [place] [address] for rehab and family plans for her to return there  Patient does not have bed hold at snf and will require PT and OT evaluations and  new insurance authorization for readmission to snf  [facility] referral made to [facility]  Patient believes son is POA and family was asked to provide copy of Advance Directive  Patient and daughter deny past MH or D&A treatment

## 2018-08-05 LAB
ERYTHROCYTE [DISTWIDTH] IN BLOOD BY AUTOMATED COUNT: 15.9 % (ref 11.6–15.1)
GLUCOSE SERPL-MCNC: 127 MG/DL (ref 65–140)
GLUCOSE SERPL-MCNC: 133 MG/DL (ref 65–140)
GLUCOSE SERPL-MCNC: 200 MG/DL (ref 65–140)
GLUCOSE SERPL-MCNC: 215 MG/DL (ref 65–140)
HCT VFR BLD AUTO: 25.2 % (ref 34.8–46.1)
HEMOCCULT STL QL: POSITIVE
HGB BLD-MCNC: 7.9 G/DL (ref 11.5–15.4)
MCH RBC QN AUTO: 28.9 PG (ref 26.8–34.3)
MCHC RBC AUTO-ENTMCNC: 31.3 G/DL (ref 31.4–37.4)
MCV RBC AUTO: 92 FL (ref 82–98)
PLATELET # BLD AUTO: 510 THOUSANDS/UL (ref 149–390)
PMV BLD AUTO: 9.3 FL (ref 8.9–12.7)
RBC # BLD AUTO: 2.73 MILLION/UL (ref 3.81–5.12)
WBC # BLD AUTO: 8.3 THOUSAND/UL (ref 4.31–10.16)

## 2018-08-05 PROCEDURE — 82272 OCCULT BLD FECES 1-3 TESTS: CPT | Performed by: INTERNAL MEDICINE

## 2018-08-05 PROCEDURE — 87338 HPYLORI STOOL AG IA: CPT | Performed by: INTERNAL MEDICINE

## 2018-08-05 PROCEDURE — C9113 INJ PANTOPRAZOLE SODIUM, VIA: HCPCS | Performed by: PHYSICIAN ASSISTANT

## 2018-08-05 PROCEDURE — 82948 REAGENT STRIP/BLOOD GLUCOSE: CPT

## 2018-08-05 PROCEDURE — 99232 SBSQ HOSP IP/OBS MODERATE 35: CPT | Performed by: INTERNAL MEDICINE

## 2018-08-05 PROCEDURE — 85027 COMPLETE CBC AUTOMATED: CPT | Performed by: PHYSICIAN ASSISTANT

## 2018-08-05 RX ADMIN — PANTOPRAZOLE SODIUM 40 MG: 40 INJECTION, POWDER, FOR SOLUTION INTRAVENOUS at 21:45

## 2018-08-05 RX ADMIN — DORZOLAMIDE HYDROCHLORIDE AND TIMOLOL MALEATE 1 DROP: 20; 5 SOLUTION/ DROPS OPHTHALMIC at 17:04

## 2018-08-05 RX ADMIN — BIMATOPROST 1 DROP: 0.1 SOLUTION/ DROPS OPHTHALMIC at 21:45

## 2018-08-05 RX ADMIN — PRAVASTATIN SODIUM 40 MG: 40 TABLET ORAL at 17:04

## 2018-08-05 RX ADMIN — VITAMIN D, TAB 1000IU (100/BT) 2000 UNITS: 25 TAB at 08:15

## 2018-08-05 RX ADMIN — LEVOTHYROXINE SODIUM 75 MCG: 75 TABLET ORAL at 05:34

## 2018-08-05 RX ADMIN — METOPROLOL TARTRATE 25 MG: 25 TABLET ORAL at 08:15

## 2018-08-05 RX ADMIN — INSULIN LISPRO 2 UNITS: 100 INJECTION, SOLUTION INTRAVENOUS; SUBCUTANEOUS at 11:47

## 2018-08-05 RX ADMIN — NYSTATIN: 100000 POWDER TOPICAL at 08:16

## 2018-08-05 RX ADMIN — INSULIN GLARGINE 15 UNITS: 100 INJECTION, SOLUTION SUBCUTANEOUS at 21:45

## 2018-08-05 RX ADMIN — INSULIN LISPRO 5 UNITS: 100 INJECTION, SOLUTION INTRAVENOUS; SUBCUTANEOUS at 11:47

## 2018-08-05 RX ADMIN — DORZOLAMIDE HYDROCHLORIDE AND TIMOLOL MALEATE 1 DROP: 20; 5 SOLUTION/ DROPS OPHTHALMIC at 08:16

## 2018-08-05 RX ADMIN — PREDNISOLONE ACETATE 1 DROP: 10 SUSPENSION/ DROPS OPHTHALMIC at 08:16

## 2018-08-05 RX ADMIN — PREDNISOLONE ACETATE 1 DROP: 10 SUSPENSION/ DROPS OPHTHALMIC at 17:04

## 2018-08-05 RX ADMIN — NYSTATIN: 100000 POWDER TOPICAL at 17:04

## 2018-08-05 RX ADMIN — PANTOPRAZOLE SODIUM 40 MG: 40 INJECTION, POWDER, FOR SOLUTION INTRAVENOUS at 08:15

## 2018-08-05 RX ADMIN — METOPROLOL TARTRATE 25 MG: 25 TABLET ORAL at 21:45

## 2018-08-05 RX ADMIN — INSULIN LISPRO 5 UNITS: 100 INJECTION, SOLUTION INTRAVENOUS; SUBCUTANEOUS at 17:04

## 2018-08-05 RX ADMIN — INSULIN LISPRO 5 UNITS: 100 INJECTION, SOLUTION INTRAVENOUS; SUBCUTANEOUS at 08:16

## 2018-08-05 NOTE — ASSESSMENT & PLAN NOTE
Patient was admitted for black tarry stool, found to have hemoglobin of 5 2  Saved to blood transfusion, hemoglobin currently stable around 8 6  She was evaluated by gastroenterologist, found to have gastric ulcer, she will be continued on b i d   PPI for now until evaluated by Gastroenterology as an outpatient, hold aspirin until further evaluation by cardiologist as outpatient  CBC in the morning

## 2018-08-05 NOTE — ASSESSMENT & PLAN NOTE
Patient has chronic kidney disease seems to be stable at this point although appears to be dehydrated  Current creatinine may also be misleading due to patient's nutritional status    Stable now

## 2018-08-05 NOTE — PROGRESS NOTES
Progress Note - Zelda Cedeno 81 y o  female MRN: [id number]    Unit/Bed#: [facility] 509-01 Encounter: [id number]        Subjective:     Patient denies any acute complaints  Per nursing staff had 2 large bm last evening/ overnight  Both appeared to be dark brown  No reported melena or BRBPR  Objective:     Vitals: Blood pressure 142/64, pulse 66, temperature 98 2 °F (36 8 °C), temperature source Oral, resp  rate (!) 24, height 5' 2" (1 575 m), weight 98 kg (216 lb 0 8 oz), SpO2 95 %  ,Body mass index is 39 52 kg/m²  Intake/Output Summary (Last 24 hours) at 08/05/18 1401  Last data filed at 08/05/18 1121   Gross per 24 hour   Intake          2331 25 ml   Output             2050 ml   Net           281 25 ml       Physical Exam:    General Appearance: Alert, appears stated age and cooperative  Lungs: Clear to auscultation bilaterally, no rales or rhonchi, no labored breathing/accessory muscle use  Heart: Regular rate and rhythm, S1, S2 normal, no murmur, click, rub or gallop  Abdomen: obese   Soft, non-tender, non-distended; bowel sounds normal; no masses or no organomegaly  Extremities: No cyanosis, edema    Invasive Devices     Peripheral Intravenous Line            Peripheral IV 08/02/18 Right Forearm 2 days    Peripheral IV 08/03/18 Left Arm 2 days                Lab Results:      Results from last 7 days  Lab Units 08/05/18  0706 08/04/18  0450   WBC Thousand/uL 8 30 10 34*   HEMOGLOBIN g/dL 7 9* 8 4*   HEMATOCRIT % 25 2* 27 0*   PLATELETS Thousands/uL 510* 561*   LYMPHO PCT %  --  10*   MONO PCT MAN %  --  3*   EOSINO PCT MANUAL %  --  4       Results from last 7 days  Lab Units 08/04/18  0450  08/02/18  1604   SODIUM mmol/L 141  < > 134*   POTASSIUM mmol/L 4 4  < > 4 7   CHLORIDE mmol/L 114*  < > 103   CO2 mmol/L 22  < > 23   BUN mg/dL 40*  < > 55*   CREATININE mg/dL 1 22  < > 1 43*   CALCIUM mg/dL 8 4  < > 8 9   TOTAL PROTEIN g/dL  --   --  6 5   BILIRUBIN TOTAL mg/dL  --   --  0 35   ALK PHOS U/L  --   -- 109   ALT U/L  --   --  32   AST U/L  --   --  30   GLUCOSE RANDOM mg/dL 143*  < > 317*   < > = values in this interval not displayed  Results from last 7 days  Lab Units 08/02/18  1604   INR  1 23*           Imaging Studies: I have personally reviewed pertinent imaging studies  Xr Chest 2 Views    Result Date: 8/2/2018  Impression: No acute cardiopulmonary disease  Workstation performed: [id number]     Ct Abdomen Pelvis With Contrast    Result Date: 8/2/2018  Impression: Suspect possible duodenitis  Fullness to the 2nd portion of the duodenum with 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 along the 2nd portion of the duodenum  With history of GI bleeding, consider direct visualization/endoscopy when clinically warranted to rule out pathology in this region  Workstation performed: [id number]       ASSESMENT/ PLAN:   Principal Problem:    Acute blood loss anemia  Active Problems:    Type 2 diabetes mellitus with diabetic polyneuropathy, without long-term current use of insulin (HCC)    Decubitus ulcer of buttock, stage 2    CKD (chronic kidney disease) stage 3, GFR 30-59 ml/min    GI bleed    Iron deficiency anemia due to chronic blood loss      1  Acute GI bleed secondary to duodenal ulcer status post epi injection and a clip in x2 on August 3, 2018      2  Anemia secondary to acute GI blood loss     3  Chronic kidney disease stage 3     4  Hiatal hernia and gastric polyps     -patient overall has symptomatic improvement  Hemoglobin has drifted down from 8 4 to 7 9 today  No signs of overt bleeding       -continue to monitor hemoglobin level serially  Transfuse as needed      -will discontinue Protonix drip and in favor place her on b i d  dosing      -await stool H pylori antigen     -Will make NPO tonight   If Hgb continues to drift down or she develops any signs of further bleeding will schedule EGD for Monday       - Agree with diet advancement, although would recommend soft diet, due to recent endo clipping       Will continue to be available for any questions or concerns    Please do not hesitate to contact us      Odilon Scott [facility]

## 2018-08-05 NOTE — ASSESSMENT & PLAN NOTE
Please refer to GI bleed, patient hemoglobin is currently stable around 8 6, 1 value below 8 was noted, likely laboratory mistake  She will be started on p o  iron supplement upon discharge

## 2018-08-05 NOTE — ASSESSMENT & PLAN NOTE
Lab Results   Component Value Date    HGBA1C 7 6 (H) 08/02/2018       Recent Labs      08/04/18   1616  08/04/18   2055  08/05/18   0640  08/05/18   1110   POCGLU  176*  205*  127  200*       Blood Sugar Average: Last 72 hrs:  (P) 239 5     Continue with current insulin regimen and monitor

## 2018-08-05 NOTE — PROGRESS NOTES
Progress Note - Frank Freeze 1935, 81 y o  female MRN: [id number]    Unit/Bed#: [facility] 509-01 Encounter: [id number]    Primary Care Provider: Kaylyn Mack MD   Date and time admitted to hospital: 8/2/2018  3:04 PM        Iron deficiency anemia due to chronic blood loss   Assessment & Plan    She needs iron treatment at home        GI bleed   Assessment & Plan    See above        CKD (chronic kidney disease) stage 3, GFR 30-59 ml/min   Assessment & Plan    Patient has chronic kidney disease seems to be stable at this point although appears to be dehydrated  Current creatinine may also be misleading due to patient's nutritional status  Stable now        Decubitus ulcer of buttock, stage 2   Assessment & Plan    Wound Care to see patient  Turn patient every 2 hours  Type 2 diabetes mellitus with diabetic polyneuropathy, without long-term current use of insulin [facility])   Assessment & Plan    Lab Results   Component Value Date    HGBA1C 7 6 (H) 08/02/2018       Recent Labs      08/04/18   1616  08/04/18   2055  08/05/18   0640  08/05/18   1110   POCGLU  176*  205*  127  200*     We are concerned about possibility of GI bleed/melena  That said patient is placed NPO  Will check blood sugars every 6 hours for now  Cover with insulin sliding scale  Blood Sugar Average: Last 72 hrs:  (P) 239 5        * Acute blood loss anemia   Assessment & Plan    Possible acute blood loss anemia in the setting of ulcer disease  SHe was admitted with black stools, this morning her hemaglobin is 5 2, she received two units of blood went up to 8 4  She went for endoscopy, Ulcer was noted and it has been treated with epinephrine  Continue monitoring cbc  protonix drip discontinued to protonix bid po                     VTE Pharmacologic Prophylaxis:   Pharmacologic: Heparin  Mechanical VTE Prophylaxis in Place: Yes    Patient Centered Rounds: I have performed bedside rounds with nursing staff today      Discussions with Specialists or Other Care Team Provider:  cardiology    Education and Discussions with Family / Patient: patient    Time Spent for Care: 45 minutes  More than 50% of total time spent on counseling and coordination of care as described above  Current Length of Stay: 3 day(s)    Current Patient Status: Inpatient   Certification Statement: monitor hemoglobin, PT/OT  Discharge Plan: tomorrow      Code Status: Level 1 - Full Code      Subjective:   I am doing well  I feel better  Objective:     Vitals:   Temp (24hrs), Av 2 °F (36 8 °C), Min:98 °F (36 7 °C), Max:98 5 °F (36 9 °C)    HR:  [66-69] 66  Resp:  [18-24] 24  BP: (125-142)/(57-67) 142/64  SpO2:  [94 %-95 %] 95 %  Body mass index is 39 52 kg/m²  Input and Output Summary (last 24 hours): Intake/Output Summary (Last 24 hours) at 18 1504  Last data filed at 18 1121   Gross per 24 hour   Intake          2331 25 ml   Output             2050 ml   Net           281 25 ml       Physical Exam:     Physical Exam   Constitutional: She appears well-developed and well-nourished  HENT:   Head: Normocephalic and atraumatic  Right Ear: External ear normal    Left Ear: External ear normal    Nose: Nose normal    Mouth/Throat: Oropharynx is clear and moist  No oropharyngeal exudate  Eyes: Conjunctivae and EOM are normal  Pupils are equal, round, and reactive to light  Right eye exhibits no discharge  Left eye exhibits no discharge  No scleral icterus  Neck: Normal range of motion  Neck supple  No JVD present  No tracheal deviation present  No thyromegaly present  Cardiovascular: Normal rate, regular rhythm and intact distal pulses  Exam reveals no gallop and no friction rub  No murmur heard  Pulmonary/Chest: Effort normal and breath sounds normal  No stridor  No respiratory distress  She has no wheezes  She has no rales  She exhibits no tenderness  Abdominal: Bowel sounds are normal  She exhibits no distension and no mass   There is no tenderness  There is no rebound and no guarding  Musculoskeletal: Normal range of motion  She exhibits no edema, tenderness or deformity  Lymphadenopathy:     She has no cervical adenopathy  Neurological: She is alert  She has normal reflexes  No cranial nerve deficit  She exhibits abnormal muscle tone  Coordination normal    Skin: Skin is warm and dry  No rash noted  No erythema  No pallor  Psychiatric: She has a normal mood and affect  Her behavior is normal  Judgment and thought content normal    Nursing note and vitals reviewed  Additional Data:     Labs:      Results from last 7 days  Lab Units 08/05/18  0706 08/04/18  0450   WBC Thousand/uL 8 30 10 34*   HEMOGLOBIN g/dL 7 9* 8 4*   HEMATOCRIT % 25 2* 27 0*   PLATELETS Thousands/uL 510* 561*   LYMPHO PCT %  --  10*   MONO PCT MAN %  --  3*   EOSINO PCT MANUAL %  --  4       Results from last 7 days  Lab Units 08/04/18  0450  08/02/18  1604   SODIUM mmol/L 141  < > 134*   POTASSIUM mmol/L 4 4  < > 4 7   CHLORIDE mmol/L 114*  < > 103   CO2 mmol/L 22  < > 23   BUN mg/dL 40*  < > 55*   CREATININE mg/dL 1 22  < > 1 43*   CALCIUM mg/dL 8 4  < > 8 9   TOTAL PROTEIN g/dL  --   --  6 5   BILIRUBIN TOTAL mg/dL  --   --  0 35   ALK PHOS U/L  --   --  109   ALT U/L  --   --  32   AST U/L  --   --  30   GLUCOSE RANDOM mg/dL 143*  < > 317*   < > = values in this interval not displayed  Results from last 7 days  Lab Units 08/02/18  1604   INR  1 23*       * I Have Reviewed All Lab Data Listed Above  * Additional Pertinent Lab Tests Reviewed:  Dominique 66 Admission Reviewed    Imaging:    Imaging Reports Reviewed Today Include:    Imaging Personally Reviewed by Myself Includes:      Recent Cultures (last 7 days):           Last 24 Hours Medication List:     Current Facility-Administered Medications:  bimatoprost 1 drop Both Eyes HS Susana Leon MD   cholecalciferol 2,000 Units Oral Daily Susana Leon MD   docusate sodium 100 mg Oral BID Vernon Taylor MD   dorzolamide-timolol 1 drop Both Eyes BID Vernon Taylor MD   HYDROcodone-acetaminophen 1 tablet Oral BID PRN Vernon Taylor MD   insulin glargine 15 Units Subcutaneous HS Benjamin Walker MD   insulin lispro 1-6 Units Subcutaneous TID With Meals Lisa Barrera PA-C   insulin lispro 5 Units Subcutaneous TID With Meals Benjamin Walker MD   levothyroxine 75 mcg Oral Daily Vernon Taylor MD   metoprolol tartrate 25 mg Oral Q12H Kailey Bey MD   nystatin  Topical BID Vernon Taylor MD   ondansetron 4 mg Intravenous Q6H PRN Vernon Taylor MD   pantoprazole 40 mg Intravenous Q12H [facility] Brady Notice, [facility]   polyethylene glycol 17 g Oral Daily Vernon Taylor MD   pravastatin 40 mg Oral Daily With Ny Randolph MD   prednisoLONE acetate 1 drop Both Eyes BID Vernon Taylor MD        Today, Patient Was Seen By: Benjamin Walker MD    ** Please Note: Dictation voice to text software may have been used in the creation of this document   **

## 2018-08-06 LAB
ANISOCYTOSIS BLD QL SMEAR: PRESENT
BASOPHILS # BLD MANUAL: 0.09 THOUSAND/UL (ref 0–0.1)
BASOPHILS NFR MAR MANUAL: 1 % (ref 0–1)
BURR CELLS BLD QL SMEAR: PRESENT
EOSINOPHIL # BLD MANUAL: 0.26 THOUSAND/UL (ref 0–0.4)
EOSINOPHIL NFR BLD MANUAL: 3 % (ref 0–6)
ERYTHROCYTE [DISTWIDTH] IN BLOOD BY AUTOMATED COUNT: 15.6 % (ref 11.6–15.1)
GLUCOSE SERPL-MCNC: 122 MG/DL (ref 65–140)
GLUCOSE SERPL-MCNC: 151 MG/DL (ref 65–140)
GLUCOSE SERPL-MCNC: 153 MG/DL (ref 65–140)
GLUCOSE SERPL-MCNC: 160 MG/DL (ref 65–140)
GLUCOSE SERPL-MCNC: 273 MG/DL (ref 65–140)
HCT VFR BLD AUTO: 28.6 % (ref 34.8–46.1)
HGB BLD-MCNC: 9 G/DL (ref 11.5–15.4)
INR PPP: 1.13 (ref 0.86–1.17)
LYMPHOCYTES # BLD AUTO: 1.4 THOUSAND/UL (ref 0.6–4.47)
LYMPHOCYTES # BLD AUTO: 16 % (ref 14–44)
MCH RBC QN AUTO: 29.1 PG (ref 26.8–34.3)
MCHC RBC AUTO-ENTMCNC: 31.5 G/DL (ref 31.4–37.4)
MCV RBC AUTO: 93 FL (ref 82–98)
MONOCYTES # BLD AUTO: 0.44 THOUSAND/UL (ref 0–1.22)
MONOCYTES NFR BLD: 5 % (ref 4–12)
NEUTROPHILS # BLD MANUAL: 6.58 THOUSAND/UL (ref 1.85–7.62)
NEUTS SEG NFR BLD AUTO: 75 % (ref 43–75)
NRBC BLD AUTO-RTO: 0 /100 WBCS
OVALOCYTES BLD QL SMEAR: PRESENT
PLATELET # BLD AUTO: 539 THOUSANDS/UL (ref 149–390)
PLATELET BLD QL SMEAR: ABNORMAL
PMV BLD AUTO: 9.3 FL (ref 8.9–12.7)
PROTHROMBIN TIME: 14.6 SECONDS (ref 11.8–14.2)
RBC # BLD AUTO: 3.09 MILLION/UL (ref 3.81–5.12)
RBC MORPH BLD: PRESENT
WBC # BLD AUTO: 8.77 THOUSAND/UL (ref 4.31–10.16)

## 2018-08-06 PROCEDURE — 99232 SBSQ HOSP IP/OBS MODERATE 35: CPT | Performed by: INTERNAL MEDICINE

## 2018-08-06 PROCEDURE — 85610 PROTHROMBIN TIME: CPT | Performed by: PHYSICIAN ASSISTANT

## 2018-08-06 PROCEDURE — 82948 REAGENT STRIP/BLOOD GLUCOSE: CPT

## 2018-08-06 PROCEDURE — 85007 BL SMEAR W/DIFF WBC COUNT: CPT | Performed by: PHYSICIAN ASSISTANT

## 2018-08-06 PROCEDURE — 85027 COMPLETE CBC AUTOMATED: CPT | Performed by: PHYSICIAN ASSISTANT

## 2018-08-06 PROCEDURE — G8978 MOBILITY CURRENT STATUS: HCPCS | Performed by: PHYSICAL THERAPIST

## 2018-08-06 PROCEDURE — 97167 OT EVAL HIGH COMPLEX 60 MIN: CPT

## 2018-08-06 PROCEDURE — G8979 MOBILITY GOAL STATUS: HCPCS | Performed by: PHYSICAL THERAPIST

## 2018-08-06 PROCEDURE — 97163 PT EVAL HIGH COMPLEX 45 MIN: CPT | Performed by: PHYSICAL THERAPIST

## 2018-08-06 PROCEDURE — G8987 SELF CARE CURRENT STATUS: HCPCS

## 2018-08-06 PROCEDURE — G8988 SELF CARE GOAL STATUS: HCPCS

## 2018-08-06 PROCEDURE — C9113 INJ PANTOPRAZOLE SODIUM, VIA: HCPCS | Performed by: PHYSICIAN ASSISTANT

## 2018-08-06 RX ORDER — HEPARIN SODIUM 5000 [USP'U]/ML
5000 INJECTION, SOLUTION INTRAVENOUS; SUBCUTANEOUS EVERY 8 HOURS SCHEDULED
Status: DISCONTINUED | OUTPATIENT
Start: 2018-08-06 | End: 2018-08-10 | Stop reason: HOSPADM

## 2018-08-06 RX ADMIN — PANTOPRAZOLE SODIUM 40 MG: 40 INJECTION, POWDER, FOR SOLUTION INTRAVENOUS at 09:55

## 2018-08-06 RX ADMIN — NYSTATIN: 100000 POWDER TOPICAL at 17:21

## 2018-08-06 RX ADMIN — NYSTATIN: 100000 POWDER TOPICAL at 10:07

## 2018-08-06 RX ADMIN — INSULIN LISPRO 1 UNITS: 100 INJECTION, SOLUTION INTRAVENOUS; SUBCUTANEOUS at 17:20

## 2018-08-06 RX ADMIN — METOPROLOL TARTRATE 25 MG: 25 TABLET ORAL at 21:16

## 2018-08-06 RX ADMIN — INSULIN LISPRO 5 UNITS: 100 INJECTION, SOLUTION INTRAVENOUS; SUBCUTANEOUS at 11:22

## 2018-08-06 RX ADMIN — HEPARIN SODIUM 5000 UNITS: 5000 INJECTION, SOLUTION INTRAVENOUS; SUBCUTANEOUS at 17:20

## 2018-08-06 RX ADMIN — INSULIN LISPRO 4 UNITS: 100 INJECTION, SOLUTION INTRAVENOUS; SUBCUTANEOUS at 11:22

## 2018-08-06 RX ADMIN — PREDNISOLONE ACETATE 1 DROP: 10 SUSPENSION/ DROPS OPHTHALMIC at 17:21

## 2018-08-06 RX ADMIN — LEVOTHYROXINE SODIUM 75 MCG: 75 TABLET ORAL at 05:19

## 2018-08-06 RX ADMIN — BIMATOPROST 1 DROP: 0.1 SOLUTION/ DROPS OPHTHALMIC at 21:20

## 2018-08-06 RX ADMIN — PANTOPRAZOLE SODIUM 40 MG: 40 INJECTION, POWDER, FOR SOLUTION INTRAVENOUS at 21:15

## 2018-08-06 RX ADMIN — DOCUSATE SODIUM 100 MG: 100 CAPSULE, LIQUID FILLED ORAL at 17:20

## 2018-08-06 RX ADMIN — PRAVASTATIN SODIUM 40 MG: 40 TABLET ORAL at 17:20

## 2018-08-06 RX ADMIN — DORZOLAMIDE HYDROCHLORIDE AND TIMOLOL MALEATE 1 DROP: 20; 5 SOLUTION/ DROPS OPHTHALMIC at 09:56

## 2018-08-06 RX ADMIN — VITAMIN D, TAB 1000IU (100/BT) 2000 UNITS: 25 TAB at 09:56

## 2018-08-06 RX ADMIN — HEPARIN SODIUM 5000 UNITS: 5000 INJECTION, SOLUTION INTRAVENOUS; SUBCUTANEOUS at 21:15

## 2018-08-06 RX ADMIN — DORZOLAMIDE HYDROCHLORIDE AND TIMOLOL MALEATE 1 DROP: 20; 5 SOLUTION/ DROPS OPHTHALMIC at 17:21

## 2018-08-06 RX ADMIN — METOPROLOL TARTRATE 25 MG: 25 TABLET ORAL at 09:56

## 2018-08-06 RX ADMIN — PREDNISOLONE ACETATE 1 DROP: 10 SUSPENSION/ DROPS OPHTHALMIC at 09:56

## 2018-08-06 RX ADMIN — INSULIN GLARGINE 15 UNITS: 100 INJECTION, SOLUTION SUBCUTANEOUS at 21:14

## 2018-08-06 NOTE — SOCIAL WORK
Discussed with SLIM during care coordination rounds  Confirmed with [facility] that patient  May return with new insurance auth  Request made for PT and OT evals and faxed request to [phone] [place] [address] to request snf authorization   Informed JALEN Ann of request

## 2018-08-06 NOTE — OCCUPATIONAL THERAPY NOTE
[address]  Evaluation     Patient Name: Marquis Abernathy  Today's Date: 2018  Problem List  Patient Active Problem List   Diagnosis    Type 2 diabetes mellitus with diabetic polyneuropathy, without long-term current use of insulin ([address] )    Decubitus ulcer of buttock, stage 2    Essential hypertension    Morbid obesity ([address] )    Candidal intertrigo    Ambulatory dysfunction    Acute blood loss anemia    Positive blood culture    CKD (chronic kidney disease) stage 3, GFR 30-59 ml/min    GI bleed    Iron deficiency anemia due to chronic blood loss     Past Medical History  Past Medical History:   Diagnosis Date    Anemia     Chronic kidney disease     Diabetes mellitus ([address] )     Type 2, blood suar checked in gi admission 257, anesthesia aware    Disease of thyroid gland     GI bleed     History of transfusion     Hyperlipidemia     Hypertension     Kidney stone     Sacral decubitus ulcer      Past Surgical History  Past Surgical History:   Procedure Laterality Date    CATARACT EXTRACTION       SECTION      CHOLECYSTECTOMY      ESOPHAGOGASTRODUODENOSCOPY N/A 8/3/2018    Procedure: ESOPHAGOGASTRODUODENOSCOPY (EGD); Surgeon: Priyank Morejon MD;  Location: [facility]; Service: Gastroenterology         18 1230   Note Type   Note type Eval/Treat   Restrictions/Precautions   Weight Bearing Precautions Per Order No   Other Precautions Fall Risk;Pain   Pain Assessment   Pain Assessment 0-10   Pain Score 5   Pain Type Chronic pain   Pain Location Knee   Pain Orientation Bilateral   Home Living   Type of Home Apartment  (in law suite attached to family home)   Home Layout One level   Bathroom Shower/Tub Walk-in shower   Bathroom Toilet Raised   Bathroom Equipment Shower chair;Grab bars around toilet;Grab bars in [address]; Wheelchair-manual   Additional Comments Pt lives alone in an in law suite  apt w/ son's house attached   PTA, pt was receiving rehab at [facility]  Prior Function   Level of [place] Independent with ADLs and functional mobility   Lives With Alone; Family  (family in attached home)   [address] Help From Family   ADL Assistance Independent   IADLs Needs assistance   Falls in the last 6 months 0   Vocational Retired   Lifestyle   Autonomy Pt I w/ basic ADLs and receives assist for IADLs  She uses a RW at baseline  Reciprocal Relationships Pt lives alone in an apt attached to her son's home  Service to Others Retired   Intrinsic Gratification Enjoys watching tv   Psychosocial   Psychosocial (WDL) WDL   ADL   Where Assessed Edge of bed   Eating Assistance 5  Supervision/Setup   Eating Deficit Setup   Grooming Assistance 4  Minimal Assistance   UB [address] 4  Minimal Assistance   LB [address] 2  Maximal [address] 4  [address] 2  Maximal [address]  2  Maximal Assistance   Functional Assistance 2  Maximal Assistance   Bed Mobility   Supine to Sit 2  Maximal assistance   Additional items Assist x 2   Sit to Supine 2  Maximal assistance   Additional items Assist x 2   Transfers   Sit to Stand 2  Maximal assistance   Additional items Assist x 2   Stand to Sit 2  Maximal assistance   Additional items Assist x 2   Additional Comments Pt attempted sit <->stand transfer EOB x2 w/ max Ax2  On first attempt, pt attempted to come to a full stand w/ B/L support of RW, however unable to complete  On 2nd attempt, pt cleared bottom from bed w  Ax2 using HHA, however still unable to come to a full stand  Balance   Static Sitting Fair -   Dynamic Sitting Poor +   Static Standing Poor   Activity Tolerance   Activity Tolerance Patient limited by pain; Patient limited by fatigue   Medical Staff Made Aware Zari- Andrew   Nurse Made Aware yes   RUE Assessment   RUE Assessment X  (limited ROM and strength)   LUE Assessment   LUE Assessment X  (limited ROM and strength)   Hand Function   Gross Motor Coordination Functional   Fine Motor Coordination Functional   Cognition   Arousal/Participation Responsive; Cooperative   Attention Within functional limits   Orientation Level Oriented to person;Oriented to place   Memory Within functional limits   Following Commands Follows one step commands with increased time or repetition   Comments Pt presents w/ flat affect but is agreeable to session  She is limited by pain t/o  Pt able to answer questions regarding PLOF, however was unable to recall how long she has been at rehab  Assessment   Limitation Decreased ADL status; Decreased UE ROM; Decreased UE strength;Decreased endurance;Decreased self-care trans;Decreased high-level ADLs   Prognosis Good   Assessment Pt is a 81 y o  female who was admitted to [facility] on 8/2/2018 with Acute blood loss anemia  Pt's comorbidities include DM2, decub ulcer of buttocks (stage 2), HTN, obesity, amb dysfunction, CKD, pos blood culture, GI bleed, iron deficiency, and HLD  At baseline pt was I w/ basic ADLs and received assist for IADLs  Pt used a RW for short distances  Pt lives alone in an in law suite apt that is attached to her son's house  PTA, pt was at [facility] for rehab  Currently pt requires min A for UB ADLs, max A for LB ADLs, and max Ax2 for bed mobility and sit <->stand transfer EOB  Pt attempted sit <->stand 2x and was able to clear bottom but unable to come to a full stand  Pt currently presents with impairments in the following categories -difficulty performing ADLs, difficulty performing IADLs, activity tolerance, endurance, standing balance/tolerance, sitting balance/tolerance, UE strength, UE ROM and safety   These impairments, as well as pt's fatigue, pain and risk for falls limit pt's ability to safely engage in all baseline areas of occupation, including grooming, bathing, dressing, toileting, functional mobility/transfers and leisure activities  From OT standpoint, recommend inpatient rehab upon D/C  OT will continue to follow to address the below stated goals  Goals   Patient Goals to have less knee pain   LTG Time Frame 7-10   Long Term Goal see below goals    Plan   Treatment Interventions ADL retraining;Functional transfer training;UE strengthening/ROM; Endurance training;Patient/family training;Equipment evaluation/education; Compensatory technique education; Energy conservation; Activityengagement   Goal Expiration Date 08/16/18   OT Frequency 3-5x/wk   Recommendation   OT Discharge Recommendation Short Term Rehab   OT - OK to Discharge Yes   Barthel Index   Feeding 10   Bathing 0   Grooming Score 0   Dressing Score 5   Bladder Score 5   Bowels Score 5   Toilet Use Score 0   Transfers (Bed/Chair) Score 5   Mobility (Level Surface) Score 0   Stairs Score 0   Barthel Index Score 30   Modified [place] Scale   Modified Colin Scale 4     LTG (7-10 DAYS)  Pt will perform all ADL's at SBA level with G balance and DME/AE as needed      Pt will perform functional transfers, including toilet transfer, at MIN A level w/ use of DME/AE as needed       Pt will perform functional mobility at a MIN A level w/ use of DME and G balance      Pt will demonstrate good carry over of RW safety and energy conservation techniques      Pt will demonstrate good carry over of pt/family education and training w/ 100% attention to task to assist w/ safe d/c planning      Pt will improve activity tolerance to G for 30-45 min treatment session      Pt will improve standing balance to G for 5-8 minutes of purposeful activity w/ G endurance          Noah De La Rosa OTR/L

## 2018-08-06 NOTE — PHYSICAL THERAPY NOTE
Physical Therapy Evaluation      Patient Active Problem List   Diagnosis    Type 2 diabetes mellitus with diabetic polyneuropathy, without long-term current use of insulin (HCC)    Decubitus ulcer of buttock, stage 2    Essential hypertension    Morbid [address] [address]    Candidal intertrigo    Ambulatory dysfunction    Acute blood loss anemia    Positive blood culture    CKD (chronic kidney disease) stage 3, GFR 30-59 ml/min    GI bleed    Iron deficiency anemia due to chronic blood loss       Past Medical History:   Diagnosis Date    Anemia     Chronic kidney disease     Diabetes mellitus ([facility] [address] )     Type 2, blood suar checked in gi admission 257, anesthesia aware    Disease of thyroid gland     GI bleed     History of transfusion     Hyperlipidemia     Hypertension     Kidney stone     Sacral decubitus ulcer        Past Surgical History:   Procedure Laterality Date    CATARACT EXTRACTION       SECTION      CHOLECYSTECTOMY      ESOPHAGOGASTRODUODENOSCOPY N/A 8/3/2018    Procedure: ESOPHAGOGASTRODUODENOSCOPY (EGD); Surgeon: Alessandro Bueno MD;  [facility]; Service: Gastroenterology        18 0841   Note Type   Note type Eval only   Pain Assessment   Pain Assessment No/denies pain   Home Living   Type of Home Apartment  (in law suite attached to family home)   Home Layout One level   Bathroom Equipment Shower chair;Grab bars in shower;Grab bars around toilet   [address] [facility]; Wheelchair-manual   Prior Function   Level of [place] Independent with ADLs and functional mobility; Needs assistance with IADLs   Lives With Alone  (family in attached home)   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   ADL Assistance Independent   IADLs Needs assistance   Falls in the last 6 months 0   Comments pt uses rw , indep with basic mobility, assist from family as needed   Restrictions/Precautions   Other Precautions Fall Risk   General   Family/Caregiver Present No   Cognition Arousal/Participation Alert   Orientation Level Oriented to person;Oriented to place;Oriented to time   RUE Assessment   RUE Assessment X  (elevation 80*, strength 3/5)   LUE Assessment   LUE Assessment X  (elevation 85*, str 3/5)   RLE Assessment   RLE Assessment X  (strength 2+/5, knee flex 40*, end rom pain)   LLE Assessment   LLE Assessment X  (strength 2+/5, knee flexion 45*, end rom pain)   Coordination   Movements are Fluid and Coordinated 0   Coordination and Movement Description slow   Light Touch   RLE Light Touch Grossly intact   LLE Light Touch Absent   LLE Light Touch Comments below knee   Bed Mobility   Rolling R 2  Maximal assistance   Additional items Assist x 1; Increased time required;Verbal cues;LE management   Rolling L 2  Maximal assistance   Additional items Assist x 1; Increased time required;Verbal cues;LE management   Supine to Sit Unable to assess  (pt refused)   Transfers   Sit to Stand Unable to assess  (pt refused)   Endurance Deficit   Endurance Deficit Yes   Endurance Deficit Description fatigue, weakness   Activity Tolerance   Activity Tolerance Other (Comment)  (limited by motivation)   Nurse Made Aware yes- dylon   Assessment   Prognosis Guarded   Problem List Decreased strength;Decreased range of motion;Decreased endurance; Impaired balance;Decreased mobility; Decreased coordination; Impaired judgement;Decreased safety awareness; Impaired sensation;Obesity;Orthopedic restrictions   Assessment pt admitted with low hgb and found to have GIB  pt also had buttock wound  pt is refered to PT  pt was living alone in  in-law apt , able to amb per perform basic adl's using rw  pt demosntrated severe functional limitations due to recent illness and chronic debility  pt refused OOB mobility wanting to use bedpan and refusing to try to amb to bathroom  pt demosntrated severe deficits in strength, rom both knees and shoulders, activity tolerance and self care   pt also  has severe sensory loss in lle below knee  pt will need skilled PT due to currently dependent level of function, below baseline status  pt will need rehab  Barriers to Discharge None   Goals   Patient Goals use bedpan   STG Expiration Date 08/13/18   Short Term Goal #1 bed mobility with min assist of 1  increase strength by 1 grade, imprive activity tolerance to 45 min  initiate eob sitting and transfer training  Plan   Treatment/Interventions Functional transfer training;LE strengthening/ROM; Therapeutic exercise; Endurance training;Cognitive reorientation;Patient/family training;Bed mobility;Spoke to nursing   PT Frequency (3-5x/week)   Recommendation   Recommendation Post acute IP rehab   PT - OK to Discharge Yes  (rehab)   Modified Colin Scale   Modified Colin Scale 5   Barthel Index   Feeding 10   Bathing 0   Grooming Score 0   Dressing Score 0   Bladder Score 5   Bowels Score 5   Toilet Use Score 0   Transfers (Bed/Chair) Score 0   Mobility (Level Surface) Score 0   Stairs Score 0   Barthel Index Score 20   History: co - morbidities, fall risk, use of assistive device, assist for adl's, cognition,   Exam: impairments in locomotion, musculoskeletal, balance, joint integrity, skin integrity,GI, cognition   Clinical: unstable/unpredictable  Complexity:high        Alejandro Ribeiro, PT

## 2018-08-06 NOTE — PROGRESS NOTES
[facility]  Progress Note - Marquis Abernathy 81 y o  female MRN: [id number]    Unit/Bed#: [facility] 509-01 Encounter: [id number]    Assessment/Plan:    Pt is a 82 y/o female who presents with anemia and suspected melena now s/p upper endoscopy on 8/3/18 which showed a bleeding duodenal ulcer      Acute GI bleed secondary to duodenal ulcer  -Now s/p EGD on 8/3/18 -  ulcer with visible vessel found in 2nd portion of duodenum (injected with epi and clippedx2)  -Per nursing pt has not had any melena or hematochezia  -Continue IV protonix 40 mg IV bid  She should complete an 8 week course of a PPI as an outpatient  -Follow up on H  Pylori stool antigen test  -Diet advanced to controlled carbohydrates  -Pt does not have any evidence of recurrent bleed and will not need a repeat upper endoscopy  Discussed with primary team  Pt is stable from a GI standpoint and we will sign off and continue to follow as needed    Anemia  -Likely 2/2 acute blood loss from upper GI bleed  -Hb remained stable overnight from 7 9 ->9 0  -Continue to trend CBC    Subjective:  Pt seen and examined  No acute events overnight  She states that she is feeling well this morning and has no complaints  She denies any nausea, vomiting, melena, or hematochezia  She denies having any recent bowel movements     Objective:     Vitals: Blood pressure 165/70, pulse 70, temperature 98 2 °F (36 8 °C), temperature source Oral, resp  rate 20, height 5' 2" (1 575 m), weight 97 2 kg (214 lb 4 6 oz), SpO2 95 %  ,Body mass index is 39 19 kg/m²  Intake/Output Summary (Last 24 hours) at 08/06/18 0915  Last data filed at 08/06/18 0757   Gross per 24 hour   Intake              880 ml   Output             1050 ml   Net             -170 ml     Review of Systems   Constitutional: Negative for chills and fever  Respiratory: Negative for shortness of breath and wheezing  Cardiovascular: Negative for chest pain and palpitations     Gastrointestinal: Negative for abdominal distention, abdominal pain, blood in stool, constipation, diarrhea, nausea and vomiting  Neurological: Negative for dizziness, light-headedness and headaches  Physical Exam:     Physical Exam   Constitutional:   Pt lying comfortably in bed, no acute distress   HENT:   Head: Normocephalic and atraumatic  Cardiovascular: Normal rate, regular rhythm and normal heart sounds  No murmur heard  Pulmonary/Chest: Effort normal  No respiratory distress  She has no wheezes  She has no rales  Abdominal: Soft  Bowel sounds are normal  She exhibits no distension  There is no tenderness  There is no rebound  Skin: Skin is warm and dry  Capillary refill takes less than 2 seconds  No erythema  Invasive Devices     Peripheral Intravenous Line            Peripheral IV 08/02/18 Right Forearm 3 days    Peripheral IV 08/03/18 Left Arm 3 days                      CBC: Lab Results   Component Value Date    WBC 8 77 08/06/2018    HGB 9 0 (L) 08/06/2018    HCT 28 6 (L) 08/06/2018    MCV 93 08/06/2018     (H) 08/06/2018    MCH 29 1 08/06/2018    MCHC 31 5 08/06/2018    RDW 15 6 (H) 08/06/2018    MPV 9 3 08/06/2018    NRBC 0 08/06/2018   PT/INR:   Lab Results   Component Value Date    INR 1 13 08/06/2018   Imaging Studies: I have personally reviewed pertinent reports  Xr Chest 2 Views    Result Date: 8/2/2018  Impression: No acute cardiopulmonary disease  Workstation performed: [id number]     Ct Abdomen Pelvis With Contrast    Result Date: 8/2/2018  Impression: Suspect possible duodenitis  Fullness to the 2nd portion of the duodenum with 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 along the 2nd portion of the duodenum  With history of GI bleeding, consider direct visualization/endoscopy when clinically warranted to rule out pathology in this region   Workstation performed: [id number]       Madhuri Kidd PGY-1  Internal Medicine

## 2018-08-06 NOTE — RESTORATIVE TECHNICIAN NOTE
Restorative Specialist Mobility Note       Activity: Bedrest, Dangle, Stand at bedside, Other (Comment) (stand EOB x 2)     Assistive Device: Front wheel walker, Other (Comment) (HHA x 2)     Ambulation Response: Tolerated poorly  Repositioned: Supine (pt back in bed)                        Assisted OT during session  For all/additional information please see OT note(s)

## 2018-08-06 NOTE — SOCIAL WORK
Cm faxed documents (facesheet, H&P, PT/OT notes and eval, and the most recent internal medicine note) to patient's insurance company ([facility]- Fax [phone]) to obtain auth for rehab  Cm to follow

## 2018-08-06 NOTE — WOUND OSTOMY CARE
Progress Note - Wound   Zelda Cedeno 81 y o  female MRN: [id number]  Unit/Bed#: [facility] 509-01 Encounter: [id number]      Assessment:   Patient seen today for skin assessment, she is awake, alert and in bed turned to her R side with wedge cushions  Findings:  1-Mid sacrum with POA unstageable pressure injury measuring as documented below  Wound bed with 100% yellow slough  2-L buttock with beefy red stage 2 pressure injury measuring as documented below  Heels are pink and blanching, no other pressure injury seen, abdominal fold/pannus and bilateral inner thigh with drying candidiasis  We place new wound care order and d/c standing preliminary skin care orders places on Friday  Skin care plans:  1-Cleanse sacral and L buttock wound with soap and water, skin prep to periwound, calcium alginate to wound bed the cover with hydrocolloid  change q3d   2-Hydraguard to bilateral heel BID and PRN  3-Elevate heels to offload pressure  4-Soft care cushion when out of bed  5-Turn/repoisiton q2h or when medically stable for pressure re-distribution on skin  6-Moisturize skin daily with skin nourishing cream        Vitals: Blood pressure 128/62, pulse 70, temperature 98 8 °F (37 1 °C), temperature source Oral, resp  rate 16, height 5' 2" (1 575 m), weight 97 2 kg (214 lb 4 6 oz), SpO2 96 %  ,Body mass index is 39 19 kg/m²  Pressure Ulcer 08/02/18 Sacrum Upper sacral slit (Active)   Staging Unstagable 8/6/2018  1:09 PM   Wound Description Slough; Yellow 8/6/2018  1:09 PM   Evangelina-wound Assessment Intact 8/6/2018  1:09 PM   Wound Length (cm) 2 5 cm 8/6/2018  1:09 PM   Wound Width (cm) 1 2 cm 8/6/2018  1:09 PM   Wound Depth (cm) 2 8/2/2018 10:30 PM   Calculated Wound Area (cm^2) 3 cm^2 8/6/2018  1:09 PM   Calculated Wound Volume (cm^3) 9 cm^3 8/2/2018 10:30 PM   Tunneling 0 cm 8/6/2018  1:09 PM   Undermining 0 8/6/2018  1:09 PM   Drainage Amount Scant 8/5/2018  8:15 AM   Drainage Description Serosanguineous 8/5/2018  8:15 AM   Treatment Elevated with pillow(s); Offload; Turn & reposition 8/4/2018  8:00 PM   Dressing Calcium Alginate;Hydrocolloid 8/6/2018  4:21 PM   Dressing Changed New dressing applied 8/6/2018  4:21 PM   Wound packed? No 8/2/2018 10:30 PM   Patient Tolerance Tolerated well 8/6/2018  1:09 PM   Dressing Status Clean;Dry; Intact 8/6/2018  8:00 AM       Pressure Ulcer 08/02/18 Buttocks Left L buttock POA stage 2 (Active)   Staging Stage II 8/6/2018  1:09 PM   Wound Description Beefy red 8/6/2018  1:09 PM   Evangelina-wound Assessment Intact 8/6/2018  1:09 PM   Wound Length (cm) 3 cm 8/6/2018  1:09 PM   Wound Width (cm) 1 cm 8/6/2018  1:09 PM   Wound Depth (cm) 0 1 8/6/2018  1:09 PM   Calculated Wound Area (cm^2) 3 cm^2 8/6/2018  1:09 PM   Calculated Wound Volume (cm^3) 0 3 cm^3 8/6/2018  1:09 PM   Change in Wound Size % 97 5 8/6/2018  1:09 PM   Tunneling 0 cm 8/6/2018  1:09 PM   Undermining 0 8/6/2018  1:09 PM   Drainage Amount Scant 8/5/2018  8:15 AM   Drainage Description Serosanguineous 8/5/2018  8:15 AM   Treatment Elevated with pillow(s); Offload; Turn & reposition 8/4/2018  8:00 PM   Dressing Calcium Alginate;Hydrocolloid 8/6/2018  4:21 PM   Dressing Changed New dressing applied 8/6/2018  4:21 PM   Wound packed? No 8/2/2018 10:30 PM   Patient Tolerance Tolerated well 8/6/2018  1:09 PM   Dressing Status Clean;Dry; Intact 8/6/2018  8:00 AM         Primary RN made aware of findings and recommendation, recommended dressing selected and placed in patients' room for RN to applied with verbal review of how to apply  Orders adjusted, wound care to continue following  Please all ext [phone] or [phone] with questions or concerns      Angelic Dalton, RN, BSN, [facility]

## 2018-08-06 NOTE — PLAN OF CARE
Problem: PHYSICAL THERAPY ADULT  Goal: Performs mobility at highest level of function for planned discharge setting  See evaluation for individualized goals  Treatment/Interventions: Functional transfer training, LE strengthening/ROM, Therapeutic exercise, Endurance training, Cognitive reorientation, Patient/family training, Bed mobility, Spoke to nursing          See flowsheet documentation for full assessment, interventions and recommendations  Prognosis: Guarded  Problem List: Decreased strength, Decreased range of motion, Decreased endurance, Impaired balance, Decreased mobility, Decreased coordination, Impaired judgement, Decreased safety awareness, Impaired sensation, Obesity, Orthopedic restrictions  Assessment: pt admitted with low hgb and found to have GIB  pt also had buttock wound  pt is refered to PT  pt was living alone in  in-law apt , able to amb per perform basic adl's using rw  pt demosntrated severe functional limitations due to recent illness and chronic debility  pt refused OOB mobility wanting to use bedpan and refusing to try to amb to bathroom  pt demosntrated severe deficits in strength, rom both knees and shoulders, activity tolerance and self care  pt also  has severe sensory loss in lle below knee  pt will need skilled PT due to currently dependent level of function, below baseline status  pt will need rehab  Barriers to Discharge: None     Recommendation: Post acute IP rehab     PT - OK to Discharge: Yes (rehab)    See flowsheet documentation for full assessment

## 2018-08-06 NOTE — PROGRESS NOTES
Progress Note - Marquis Abernathy 1935, 81 y o  female MRN: [id number]    Unit/Bed#: [facility] 509-01 Encounter: [id number]    Primary Care Provider: Maykel Lan MD   Date and time admitted to hospital: 8/2/2018  3:04 PM        Iron deficiency anemia due to chronic blood loss   Assessment & Plan    She needs iron treatment at home        GI bleed   Assessment & Plan    See above        CKD (chronic kidney disease) stage 3, GFR 30-59 ml/min   Assessment & Plan    Patient has chronic kidney disease seems to be stable at this point although appears to be dehydrated  Current creatinine may also be misleading due to patient's nutritional status  Stable now        Decubitus ulcer of buttock, stage 2   Assessment & Plan    Wound Care to see patient  Turn patient every 2 hours  Type 2 diabetes mellitus with diabetic polyneuropathy, without long-term current use of insulin [facility])   Assessment & Plan    Lab Results   Component Value Date    HGBA1C 7 6 (H) 08/02/2018       Recent Labs      08/04/18   1616  08/04/18   2055  08/05/18   0640  08/05/18   1110   POCGLU  176*  205*  127  200*     We are concerned about possibility of GI bleed/melena  That said patient is placed NPO  Will check blood sugars every 6 hours for now  Cover with insulin sliding scale  Blood Sugar Average: Last 72 hrs:  (P) 239 5        * Acute blood loss anemia   Assessment & Plan    Possible acute blood loss anemia in the setting of ulcer disease  SHe was admitted with black stools, this morning her hemaglobin is 5 2, she received two units of blood went up to 8 4  She went for endoscopy, Ulcer was noted and it has been treated with epinephrine  Continue monitoring cbc  protonix drip discontinued to protonix bid po  She was doing well, no melana, no brbpr  She needs authorization to return to her facility                        VTE Pharmacologic Prophylaxis:   Pharmacologic: Heparin  Mechanical VTE Prophylaxis in Place: Yes    Patient Centered Rounds: I have performed bedside rounds with nursing staff today  Discussions with Specialists or Other Care Team Provider:  cardiology    Education and Discussions with Family / Patient: patient    Time Spent for Care: 45 minutes  More than 50% of total time spent on counseling and coordination of care as described above  Current Length of Stay: 4 day(s)    Current Patient Status: Inpatient   Certification Statement: monitor hemoglobin, PT/OT  Discharge Plan: tomorrow      Code Status: Level 1 - Full Code      Subjective:   I am doing well  I feel better  Objective:     Vitals:   Temp (24hrs), Av 3 °F (36 8 °C), Min:97 8 °F (36 6 °C), Max:98 8 °F (37 1 °C)    HR:  [70-74] 70  Resp:  [16-20] 16  BP: (115-165)/(62-70) 128/62  SpO2:  [93 %-96 %] 96 %  Body mass index is 39 19 kg/m²  Input and Output Summary (last 24 hours): Intake/Output Summary (Last 24 hours) at 18 1647  Last data filed at 18 1309   Gross per 24 hour   Intake             1040 ml   Output             1350 ml   Net             -310 ml       Physical Exam:     Physical Exam   Constitutional: She appears well-developed and well-nourished  HENT:   Head: Normocephalic and atraumatic  Right Ear: External ear normal    Left Ear: External ear normal    Nose: Nose normal    Mouth/Throat: Oropharynx is clear and moist  No oropharyngeal exudate  Eyes: Conjunctivae and EOM are normal  Pupils are equal, round, and reactive to light  Right eye exhibits no discharge  Left eye exhibits no discharge  No scleral icterus  Neck: Normal range of motion  Neck supple  No JVD present  No tracheal deviation present  No thyromegaly present  Cardiovascular: Normal rate, regular rhythm and intact distal pulses  Exam reveals no gallop and no friction rub  No murmur heard  Pulmonary/Chest: Effort normal and breath sounds normal  No stridor  No respiratory distress  She has no wheezes  She has no rales   She exhibits no tenderness  Abdominal: Bowel sounds are normal  She exhibits no distension and no mass  There is no tenderness  There is no rebound and no guarding  Musculoskeletal: Normal range of motion  She exhibits no edema, tenderness or deformity  Lymphadenopathy:     She has no cervical adenopathy  Neurological: She is alert  She has normal reflexes  No cranial nerve deficit  She exhibits abnormal muscle tone  Coordination normal    Skin: Skin is warm and dry  No rash noted  No erythema  No pallor  Psychiatric: She has a normal mood and affect  Her behavior is normal  Judgment and thought content normal    Nursing note and vitals reviewed  Additional Data:     Labs:      Results from last 7 days  Lab Units 08/06/18  0507   WBC Thousand/uL 8 77   HEMOGLOBIN g/dL 9 0*   HEMATOCRIT % 28 6*   PLATELETS Thousands/uL 539*   LYMPHO PCT % 16   MONO PCT MAN % 5   EOSINO PCT MANUAL % 3       Results from last 7 days  Lab Units 08/04/18  0450  08/02/18  1604   SODIUM mmol/L 141  < > 134*   POTASSIUM mmol/L 4 4  < > 4 7   CHLORIDE mmol/L 114*  < > 103   CO2 mmol/L 22  < > 23   BUN mg/dL 40*  < > 55*   CREATININE mg/dL 1 22  < > 1 43*   CALCIUM mg/dL 8 4  < > 8 9   TOTAL PROTEIN g/dL  --   --  6 5   BILIRUBIN TOTAL mg/dL  --   --  0 35   ALK PHOS U/L  --   --  109   ALT U/L  --   --  32   AST U/L  --   --  30   GLUCOSE RANDOM mg/dL 143*  < > 317*   < > = values in this interval not displayed  Results from last 7 days  Lab Units 08/06/18  0507   INR  1 13       * I Have Reviewed All Lab Data Listed Above  * Additional Pertinent Lab Tests Reviewed:  Dominique 66 Admission Reviewed    Imaging:    Imaging Reports Reviewed Today Include:    Imaging Personally Reviewed by Myself Includes:      Recent Cultures (last 7 days):           Last 24 Hours Medication List:     Current Facility-Administered Medications:  bimatoprost 1 drop Both Eyes HS Kathryn Thomas MD   cholecalciferol 2,000 Units Oral Daily Blaine Stanley MD   docusate sodium 100 mg Oral BID Blaine Stanley MD   dorzolamide-timolol 1 drop Both Eyes BID Blaine Stanley MD   HYDROcodone-acetaminophen 1 tablet Oral BID PRN Blaine Stanley MD   insulin glargine 15 Units Subcutaneous HS Shilpi Navarro MD   insulin lispro 1-6 Units Subcutaneous TID With Meals Lisa Barrera PA-C   insulin lispro 5 Units Subcutaneous TID With Meals Shilpi Navarro MD   levothyroxine 75 mcg Oral Daily Blaine Stanley MD   metoprolol tartrate 25 mg Oral Q12H Feroz Flores MD   nystatin  Topical BID Blaine Stanley MD   ondansetron 4 mg Intravenous Q6H PRN Blaine Stanley MD   pantoprazole 40 mg Intravenous Q12H [address] Chelsea Marcial PA-C   polyethylene glycol 17 g Oral Daily Blaine Stanley MD   pravastatin 40 mg Oral Daily With Claudio Paris MD   prednisoLONE acetate 1 drop Both Eyes BID Blaine Stanley MD        Today, Patient Was Seen By: Shilpi Navarro MD    ** Please Note: Dictation voice to text software may have been used in the creation of this document   **

## 2018-08-06 NOTE — PLAN OF CARE
Problem: OCCUPATIONAL THERAPY ADULT  Goal: Performs self-care activities at highest level of function for planned discharge setting  See evaluation for individualized goals  Treatment Interventions: ADL retraining, Functional transfer training, UE strengthening/ROM, Endurance training, Patient/family training, Equipment evaluation/education, Compensatory technique education, Energy conservation, Activityengagement          See flowsheet documentation for full assessment, interventions and recommendations  Limitation: Decreased ADL status, Decreased UE ROM, Decreased UE strength, Decreased endurance, Decreased self-care trans, Decreased high-level ADLs  Prognosis: Good  Assessment: Pt is a 81 y o  female who was admitted to [facility] on 8/2/2018 with Acute blood loss anemia  Pt's comorbidities include DM2, decub ulcer of buttocks (stage 2), HTN, obesity, amb dysfunction, CKD, pos blood culture, GI bleed, iron deficiency, and HLD  At baseline pt was I w/ basic ADLs and received assist for IADLs  Pt used a RW for short distances  Pt lives alone in an in law suite apt that is attached to her son's house  PTA, pt was at [facility] for rehab  Currently pt requires min A for UB ADLs, max A for LB ADLs, and max Ax2 for bed mobility and sit <->stand transfer EOB  Pt attempted sit <->stand 2x and was able to clear bottom but unable to come to a full stand  Pt currently presents with impairments in the following categories -difficulty performing ADLs, difficulty performing IADLs, activity tolerance, endurance, standing balance/tolerance, sitting balance/tolerance, UE strength, UE ROM and safety  These impairments, as well as pt's fatigue, pain and risk for falls limit pt's ability to safely engage in all baseline areas of occupation, including grooming, bathing, dressing, toileting, functional mobility/transfers and leisure activities  From OT standpoint, recommend inpatient rehab upon D/C   OT will continue to follow to address the below stated goals  OT Discharge Recommendation: Short Term Rehab  OT - OK to Discharge:  Yes

## 2018-08-07 ENCOUNTER — APPOINTMENT (INPATIENT)
Dept: RADIOLOGY | Facility: HOSPITAL | Age: 83
DRG: 378 | End: 2018-08-07
Attending: INTERNAL MEDICINE
Payer: COMMERCIAL

## 2018-08-07 PROBLEM — L89.159 SACRAL DECUBITUS ULCER: Status: ACTIVE | Noted: 2018-08-07

## 2018-08-07 PROBLEM — R68.89 ABNORMAL BODY TEMPERATURE: Status: ACTIVE | Noted: 2018-08-07

## 2018-08-07 LAB
AMORPH URATE CRY URNS QL MICRO: ABNORMAL /HPF
ANION GAP SERPL CALCULATED.3IONS-SCNC: 7 MMOL/L (ref 4–13)
BACTERIA UR QL AUTO: ABNORMAL /HPF
BASOPHILS # BLD AUTO: 0.06 THOUSANDS/ΜL (ref 0–0.1)
BASOPHILS NFR BLD AUTO: 1 % (ref 0–1)
BILIRUB UR QL STRIP: NEGATIVE
BUN SERPL-MCNC: 12 MG/DL (ref 5–25)
CALCIUM SERPL-MCNC: 8.2 MG/DL (ref 8.3–10.1)
CHLORIDE SERPL-SCNC: 108 MMOL/L (ref 100–108)
CLARITY UR: ABNORMAL
CO2 SERPL-SCNC: 24 MMOL/L (ref 21–32)
COLOR UR: YELLOW
CREAT SERPL-MCNC: 0.93 MG/DL (ref 0.6–1.3)
EOSINOPHIL # BLD AUTO: 0.28 THOUSAND/ΜL (ref 0–0.61)
EOSINOPHIL NFR BLD AUTO: 3 % (ref 0–6)
ERYTHROCYTE [DISTWIDTH] IN BLOOD BY AUTOMATED COUNT: 14.9 % (ref 11.6–15.1)
GFR SERPL CREATININE-BSD FRML MDRD: 57 ML/MIN/1.73SQ M
GLUCOSE SERPL-MCNC: 114 MG/DL (ref 65–140)
GLUCOSE SERPL-MCNC: 115 MG/DL (ref 65–140)
GLUCOSE SERPL-MCNC: 124 MG/DL (ref 65–140)
GLUCOSE SERPL-MCNC: 150 MG/DL (ref 65–140)
GLUCOSE SERPL-MCNC: 191 MG/DL (ref 65–140)
GLUCOSE UR STRIP-MCNC: NEGATIVE MG/DL
H PYLORI AG STL QL IA: NEGATIVE
HCT VFR BLD AUTO: 27.4 % (ref 34.8–46.1)
HGB BLD-MCNC: 8.6 G/DL (ref 11.5–15.4)
HGB UR QL STRIP.AUTO: ABNORMAL
IMM GRANULOCYTES # BLD AUTO: 0.21 THOUSAND/UL (ref 0–0.2)
IMM GRANULOCYTES NFR BLD AUTO: 2 % (ref 0–2)
KETONES UR STRIP-MCNC: NEGATIVE MG/DL
LEUKOCYTE ESTERASE UR QL STRIP: ABNORMAL
LYMPHOCYTES # BLD AUTO: 1.34 THOUSANDS/ΜL (ref 0.6–4.47)
LYMPHOCYTES NFR BLD AUTO: 13 % (ref 14–44)
MCH RBC QN AUTO: 28.5 PG (ref 26.8–34.3)
MCHC RBC AUTO-ENTMCNC: 31.4 G/DL (ref 31.4–37.4)
MCV RBC AUTO: 91 FL (ref 82–98)
MONOCYTES # BLD AUTO: 1.17 THOUSAND/ΜL (ref 0.17–1.22)
MONOCYTES NFR BLD AUTO: 11 % (ref 4–12)
NEUTROPHILS # BLD AUTO: 7.65 THOUSANDS/ΜL (ref 1.85–7.62)
NEUTS SEG NFR BLD AUTO: 70 % (ref 43–75)
NITRITE UR QL STRIP: POSITIVE
NON-SQ EPI CELLS URNS QL MICRO: ABNORMAL /HPF
NRBC BLD AUTO-RTO: 0 /100 WBCS
OTHER STN SPEC: ABNORMAL
PH UR STRIP.AUTO: 5.5 [PH] (ref 4.5–8)
PLATELET # BLD AUTO: 515 THOUSANDS/UL (ref 149–390)
PMV BLD AUTO: 9.5 FL (ref 8.9–12.7)
POTASSIUM SERPL-SCNC: 3.7 MMOL/L (ref 3.5–5.3)
PROT UR STRIP-MCNC: NEGATIVE MG/DL
RBC # BLD AUTO: 3.02 MILLION/UL (ref 3.81–5.12)
RBC #/AREA URNS AUTO: ABNORMAL /HPF
SODIUM SERPL-SCNC: 139 MMOL/L (ref 136–145)
SP GR UR STRIP.AUTO: 1.01 (ref 1–1.03)
UROBILINOGEN UR QL STRIP.AUTO: 1 E.U./DL
WBC # BLD AUTO: 10.71 THOUSAND/UL (ref 4.31–10.16)
WBC #/AREA URNS AUTO: ABNORMAL /HPF

## 2018-08-07 PROCEDURE — 87186 SC STD MICRODIL/AGAR DIL: CPT | Performed by: INTERNAL MEDICINE

## 2018-08-07 PROCEDURE — 99232 SBSQ HOSP IP/OBS MODERATE 35: CPT | Performed by: INTERNAL MEDICINE

## 2018-08-07 PROCEDURE — 85025 COMPLETE CBC W/AUTO DIFF WBC: CPT | Performed by: INTERNAL MEDICINE

## 2018-08-07 PROCEDURE — 97110 THERAPEUTIC EXERCISES: CPT

## 2018-08-07 PROCEDURE — 71045 X-RAY EXAM CHEST 1 VIEW: CPT

## 2018-08-07 PROCEDURE — 82948 REAGENT STRIP/BLOOD GLUCOSE: CPT

## 2018-08-07 PROCEDURE — 80048 BASIC METABOLIC PNL TOTAL CA: CPT | Performed by: INTERNAL MEDICINE

## 2018-08-07 PROCEDURE — C9113 INJ PANTOPRAZOLE SODIUM, VIA: HCPCS | Performed by: PHYSICIAN ASSISTANT

## 2018-08-07 PROCEDURE — 87077 CULTURE AEROBIC IDENTIFY: CPT | Performed by: INTERNAL MEDICINE

## 2018-08-07 PROCEDURE — 81001 URINALYSIS AUTO W/SCOPE: CPT | Performed by: INTERNAL MEDICINE

## 2018-08-07 PROCEDURE — 87086 URINE CULTURE/COLONY COUNT: CPT | Performed by: INTERNAL MEDICINE

## 2018-08-07 RX ORDER — PANTOPRAZOLE SODIUM 40 MG/1
40 TABLET, DELAYED RELEASE ORAL 2 TIMES DAILY
Qty: 60 TABLET | Refills: 0 | Status: SHIPPED | OUTPATIENT
Start: 2018-08-07 | End: 2020-08-27 | Stop reason: ALTCHOICE

## 2018-08-07 RX ORDER — MAGNESIUM SULFATE HEPTAHYDRATE 40 MG/ML
2 INJECTION, SOLUTION INTRAVENOUS ONCE
Status: DISCONTINUED | OUTPATIENT
Start: 2018-08-07 | End: 2018-08-07

## 2018-08-07 RX ORDER — INSULIN GLARGINE 100 [IU]/ML
15 INJECTION, SOLUTION SUBCUTANEOUS
Qty: 10 ML | Refills: 0 | Status: SHIPPED | OUTPATIENT
Start: 2018-08-07 | End: 2020-08-27 | Stop reason: ALTCHOICE

## 2018-08-07 RX ORDER — DOXYCYCLINE HYCLATE 50 MG/1
324 CAPSULE, GELATIN COATED ORAL
Qty: 60 TABLET | Refills: 0 | Status: SHIPPED | OUTPATIENT
Start: 2018-08-07 | End: 2020-08-27 | Stop reason: ALTCHOICE

## 2018-08-07 RX ORDER — DOXYCYCLINE HYCLATE 50 MG/1
324 CAPSULE, GELATIN COATED ORAL
Status: DISCONTINUED | OUTPATIENT
Start: 2018-08-07 | End: 2018-08-10 | Stop reason: HOSPADM

## 2018-08-07 RX ADMIN — PANTOPRAZOLE SODIUM 40 MG: 40 INJECTION, POWDER, FOR SOLUTION INTRAVENOUS at 08:31

## 2018-08-07 RX ADMIN — INSULIN LISPRO 5 UNITS: 100 INJECTION, SOLUTION INTRAVENOUS; SUBCUTANEOUS at 08:32

## 2018-08-07 RX ADMIN — VITAMIN D, TAB 1000IU (100/BT) 2000 UNITS: 25 TAB at 08:31

## 2018-08-07 RX ADMIN — DORZOLAMIDE HYDROCHLORIDE AND TIMOLOL MALEATE 1 DROP: 20; 5 SOLUTION/ DROPS OPHTHALMIC at 08:32

## 2018-08-07 RX ADMIN — FERROUS GLUCONATE 324 MG: 324 TABLET ORAL at 18:07

## 2018-08-07 RX ADMIN — HEPARIN SODIUM 5000 UNITS: 5000 INJECTION, SOLUTION INTRAVENOUS; SUBCUTANEOUS at 21:19

## 2018-08-07 RX ADMIN — PREDNISOLONE ACETATE 1 DROP: 10 SUSPENSION/ DROPS OPHTHALMIC at 18:08

## 2018-08-07 RX ADMIN — DORZOLAMIDE HYDROCHLORIDE AND TIMOLOL MALEATE 1 DROP: 20; 5 SOLUTION/ DROPS OPHTHALMIC at 18:08

## 2018-08-07 RX ADMIN — PRAVASTATIN SODIUM 40 MG: 40 TABLET ORAL at 18:07

## 2018-08-07 RX ADMIN — HEPARIN SODIUM 5000 UNITS: 5000 INJECTION, SOLUTION INTRAVENOUS; SUBCUTANEOUS at 06:11

## 2018-08-07 RX ADMIN — HEPARIN SODIUM 5000 UNITS: 5000 INJECTION, SOLUTION INTRAVENOUS; SUBCUTANEOUS at 14:02

## 2018-08-07 RX ADMIN — PREDNISOLONE ACETATE 1 DROP: 10 SUSPENSION/ DROPS OPHTHALMIC at 08:32

## 2018-08-07 RX ADMIN — METOPROLOL TARTRATE 25 MG: 25 TABLET ORAL at 21:19

## 2018-08-07 RX ADMIN — INSULIN LISPRO 2 UNITS: 100 INJECTION, SOLUTION INTRAVENOUS; SUBCUTANEOUS at 12:14

## 2018-08-07 RX ADMIN — LEVOTHYROXINE SODIUM 75 MCG: 75 TABLET ORAL at 06:10

## 2018-08-07 RX ADMIN — PANTOPRAZOLE SODIUM 40 MG: 40 INJECTION, POWDER, FOR SOLUTION INTRAVENOUS at 21:19

## 2018-08-07 RX ADMIN — CEFTRIAXONE 1000 MG: 1 INJECTION, POWDER, FOR SOLUTION INTRAMUSCULAR; INTRAVENOUS at 18:16

## 2018-08-07 RX ADMIN — BIMATOPROST 1 DROP: 0.1 SOLUTION/ DROPS OPHTHALMIC at 21:20

## 2018-08-07 RX ADMIN — NYSTATIN: 100000 POWDER TOPICAL at 08:36

## 2018-08-07 RX ADMIN — INSULIN GLARGINE 15 UNITS: 100 INJECTION, SOLUTION SUBCUTANEOUS at 21:19

## 2018-08-07 RX ADMIN — METOPROLOL TARTRATE 25 MG: 25 TABLET ORAL at 08:31

## 2018-08-07 RX ADMIN — INSULIN LISPRO 5 UNITS: 100 INJECTION, SOLUTION INTRAVENOUS; SUBCUTANEOUS at 17:34

## 2018-08-07 RX ADMIN — NYSTATIN: 100000 POWDER TOPICAL at 18:16

## 2018-08-07 NOTE — CASE MANAGEMENT
Continued Stay Review    Date: 8/7/18    Vital Signs: /62 (BP Location: Right arm)   Pulse 73   Temp 98 5 °F (36 9 °C) (Oral)   Resp 20   Ht 5' 2" (1 575 m)   Wt 95 4 kg (210 lb 5 1 oz)   SpO2 93%   BMI 38 47 kg/m²     Medications:   Scheduled Meds:   Current Facility-Administered Medications:  bimatoprost 1 drop Both Eyes HS   cefTRIAXone 1,000 mg Intravenous Q24H   cholecalciferol 2,000 Units Oral Daily   docusate sodium 100 mg Oral BID   dorzolamide-timolol 1 drop Both Eyes BID   ferrous gluconate 324 mg Oral BID AC   heparin (porcine) 5,000 Units Subcutaneous Q8H [address]   insulin glargine 15 Units Subcutaneous HS   insulin lispro 1-6 Units Subcutaneous TID With Meals   insulin lispro 5 Units Subcutaneous TID With Meals   levothyroxine 75 mcg Oral Daily   metoprolol tartrate 25 mg Oral Q12H SABRINA   nystatin  Topical BID   ondansetron 4 mg Intravenous Q6H PRN   pantoprazole 40 mg Intravenous Q12H [address]   polyethylene glycol 17 g Oral Daily   pravastatin 40 mg Oral Daily With Dinner   prednisoLONE acetate 1 drop Both Eyes BID     Abnormal Labs/Diagnostic Results:     Lab Units 08/07/18  0424   WBC Thousand/uL 10 71*   HEMOGLOBIN g/dL 8 6*   HEMATOCRIT % 27 4*   PLATELETS Thousands/uL 515*   NEUTROS PCT % 70   LYMPHS PCT % 13*   MONOS PCT % 11   EOS PCT % 3        Lab Units 08/07/18  0424   08/02/18  1604   SODIUM mmol/L 139  < > 134*   POTASSIUM mmol/L 3 7  < > 4 7   CHLORIDE mmol/L 108  < > 103   CO2 mmol/L 24  < > 23   BUN mg/dL 12  < > 55*   CREATININE mg/dL 0 93  < > 1 43*   CALCIUM mg/dL 8 2*  < > 8 9   TOTAL PROTEIN g/dL  --   --  6 5   BILIRUBIN TOTAL mg/dL  --   --  0 35   ALK PHOS U/L  --   --  109   ALT U/L  --   --  32   AST U/L  --   --  30   GLUCOSE RANDOM mg/dL 114  < > 317*       Age/Sex: 81 y o  female     Assessment/Plan:        Sacral decubitus ulcer   Assessment & Plan     Present on admission, reported by nurse, stage II as well as unstageable, without clear signs of infection    Continue with local care, consult wound care           Iron deficiency anemia due to chronic blood loss   Assessment & Plan     She needs iron treatment at home          GI bleed   Assessment & Plan     Patient was admitted for black tarry stool, found to have hemoglobin of 5 2  S/P blood transfusion, hemoglobin currently stable around 8 6  She was evaluated by gastroenterologist, found to have gastric ulcer, she will be continued on b i d  PPI for now until evaluated by Gastroenterology as an outpatient, hold aspirin until further evaluation by cardiologist as outpatient  CBC in the morning           CKD (chronic kidney disease) stage 3, GFR 30-59 ml/min   Assessment & Plan     Patient has chronic kidney disease seems to be stable at this point although appears to be dehydrated  Current creatinine may also be misleading due to patient's nutritional status  Stable now           Decubitus ulcer of buttock, stage 2   Assessment & Plan     Wound Care to see patient  Turn patient every 2 hours           Type 2 diabetes mellitus with diabetic polyneuropathy, without long-term current use of insulin [facility])   Assessment & Plan             Lab Results   Component Value Date     HGBA1C 7 6 (H) 08/02/2018                Recent Labs      08/04/18   1616  08/04/18   2055  08/05/18   0640  08/05/18   1110   POCGLU  176*  205*  127  200*         Blood Sugar Average: Last 72 hrs:  (P) 239  5      Continue with current insulin regimen and monitor          * Abnormal body temperature   Assessment & Plan     Patient was noted to have abnormal body temperature yesterday night, 100 degree F  Patient is limited historian secondary to possible underlying memory loss/cognitive impairment  Denies cough, chest pain or shortness of breath  Unclear if urinary symptoms at this time  Urinalysis suggestive of urinary tract infection, white count is elevated, start Rocephin, no previous urine cultures are available in our system, send for urine cultures  Chest x-ray suggestive of bilateral atelectasis versus infection, pneumonia less likely, fever might be related also to atelectasis, start incentive spirometer and mobilized out of the bed if able             VTE Pharmacologic Prophylaxis: yes  Pharmacologic: Heparin  Mechanical VTE Prophylaxis in Place: Yes      Current Length of Stay: 5 day(s)     Current Patient Status: Inpatient   Certification Statement: monitor hemoglobin  Discharge Plan: tbd

## 2018-08-07 NOTE — PHYSICAL THERAPY NOTE
Physical Therapy Progress Note     08/07/18 0856   Pain Assessment   Pain Assessment 0-10   Pain Score 5   Pain Type Acute pain   Pain Location Knee   Pain Orientation Bilateral   Restrictions/Precautions   Weight Bearing Precautions Per Order No   Other Precautions Pain   General   Chart Reviewed Yes   Family/Caregiver Present No   Cognition   Overall Cognitive Status WFL   Arousal/Participation Alert; Responsive   Subjective   Subjective Pt is lying in bed  Agrees to complete exercise  Transfers   Sit to Stand Unable to assess   Ambulation/Elevation   Gait pattern Not tested   Endurance Deficit   Endurance Deficit Yes   Endurance Deficit Description pain   Activity Tolerance   Activity Tolerance Patient limited by pain   Nurse [address] to see per BEAN Alva   Exercises   Quad Sets Supine;20 reps;AROM; Bilateral   Glute Sets Supine;20 reps   Knee AROM Short Arc Quad Supine;20 reps;Right   Ankle Pumps Supine;20 reps;AROM; Bilateral   Assessment   Prognosis Guarded   Problem List Decreased strength;Decreased range of motion;Decreased endurance;Decreased mobility;Pain   Assessment Pt is limited today due to L knee pain  Encouraged to complete additional exercises while lying supine however declined due to pain  BEAN Alva aware of L knee pain  Pt would benefit from continued physical therapy to maximize functional mobility and safety  Goals   Patient Goals To not have any knee pain   STG Expiration Date 08/13/18   Treatment Day 1   Plan   Treatment/Interventions LE strengthening/ROM; Therapeutic exercise;Spoke to nursing   Progress Slow progress, decreased activity tolerance   PT Frequency (3-5x/week)   Recommendation   Recommendation Post acute IP rehab     Juan Stanley, AYSHA

## 2018-08-07 NOTE — DISCHARGE INSTRUCTIONS
Please cjheck patient cbc in 3-5 days, last Hgb stable at 8 1 on 8/10  Please resume ASA when ok with GI after FU  Acute Posthemorrhagic Anemia   WHAT YOU NEED TO KNOW:   Acute posthemorrhagic anemia is a condition that develops when you lose a large amount of blood quickly  Anemia is a low number of red blood cells or a low amount of hemoglobin in your red blood cells  Hemoglobin is a protein that helps red blood cells carry oxygen throughout your body  WHILE YOU ARE HERE:   Informed consent  is a legal document that explains the tests, treatments, or procedures that you may need  Informed consent means you understand what will be done and can make decisions about what you want  You give your permission when you sign the consent form  You can have someone sign this form for you if you are not able to sign it  You have the right to understand your medical care in words you know  Before you sign the consent form, understand the risks and benefits of what will be done  Make sure all your questions are answered  Activity:  You may need to rest  Your healthcare provider will tell you when it is okay to increase your activities  Call your healthcare provider before getting up for the first time  If you ever feel weak or dizzy, sit or lie down right away  Then call your healthcare provider  Heart monitor: This is also called an ECG or EKG  Sticky pads placed on your skin record your heart's electrical activity  You may need extra oxygen  if your blood oxygen level is lower than it should be  You may get oxygen through a mask placed over your nose and mouth or through small tubes placed in your nostrils  Ask your healthcare provider before you take off the mask or oxygen tubing  A pulse oximeter  is a device that measures the amount of oxygen in your blood  A cord with a clip or sticky strip is placed on your finger, ear, or toe  The other end of the cord is hooked to a machine     Iron supplements  may be given if your iron level is too low  Blood tests  are used to check the number of red blood cells, white blood cells, and platelets  Platelets are the sticky part of your blood that helps form clots to stop bleeding  The tests may also be used to find how well your blood clots to stop bleeding  Treatment:   · A blood transfusion  may be needed if your body cannot replace the blood you have lost     · Surgery  may be needed to stop the bleeding, or to fix an injury  · Fluids  may be given through an IV to help increase your blood pressure  RISKS:   Severe blood loss can cause shock  Shock can make your blood pressure fall very low  Severe blood loss can be life-threatening if the blood is not replaced quickly enough  CARE AGREEMENT:   You have the right to help plan your care  Learn about your health condition and how it may be treated  Discuss treatment options with your caregivers to decide what care you want to receive  You always have the right to refuse treatment  © 2017 [address] Information is for End User's use only and may not be sold, redistributed or otherwise used for commercial purposes  All illustrations and images included in CareNotes® are the copyrighted property of [employer]  or [employer] Lov[employer]  The above information is an  only  It is not intended as medical advice for individual conditions or treatments  Talk to your doctor, nurse or pharmacist before following any medical regimen to see if it is safe and effective for you  Iron Deficiency Anemia   WHAT YOU NEED TO KNOW:   Iron deficiency anemia (ANIKET) is low levels of red blood cells and hemoglobin caused by a lack of iron in the blood  Iron helps make hemoglobin  Hemoglobin is part of your red blood cell and helps carry oxygen to your body  The most common causes are blood loss and not enough iron in the foods you eat     DISCHARGE INSTRUCTIONS:   Call 911 for any of the following: · You have shortness of breath, even when you rest     Seek care immediately if:   · You have dark or bloody bowel movements  · You are too dizzy to stand up  · You have trouble swallowing because of the pain in your mouth and throat  Contact your healthcare provider if:   · You have heartburn, constipation, or diarrhea  · You have nausea or are vomiting  · You are dizzy or very tired  · You have questions or concerns about your condition or care  Medicines: You may need any of the following:  · Iron or folic acid supplements  will help increase your red blood cell and hemoglobin levels  · Bowel movement softeners  may be needed if the iron supplements cause constipation  · Take your medicine as directed  Contact your healthcare provider if you think your medicine is not helping or if you have side effects  Tell him of her if you are allergic to any medicine  Keep a list of the medicines, vitamins, and herbs you take  Include the amounts, and when and why you take them  Bring the list or the pill bottles to follow-up visits  Carry your medicine list with you in case of an emergency  Eat foods rich in iron and protein:  Nuts, meat, dark leafy green vegetables, and beans are high in iron and protein  Do not drink coffee, tea, or other liquids with caffeine  Limit milk to 2 cups a day  You may need to meet with a dietitian to create the right food plan for you  Drink liquids as directed:  Liquids help prevent constipation  Ask how much liquid to drink each day and which liquids are best for you  Follow up with your healthcare provider as directed:  Write down your questions so you remember to ask them during your visits  © 2017 [address]  Information is for End User's use only and may not be sold, redistributed or otherwise used for commercial purposes   All illustrations and images included in CareNotes® are the copyrighted property of [employer]  or [employer]  The above information is an  only  It is not intended as medical advice for individual conditions or treatments  Talk to your doctor, nurse or pharmacist before following any medical regimen to see if it is safe and effective for you

## 2018-08-07 NOTE — PLAN OF CARE
Problem: PHYSICAL THERAPY ADULT  Goal: Performs mobility at highest level of function for planned discharge setting  See evaluation for individualized goals  Treatment/Interventions: Functional transfer training, LE strengthening/ROM, Therapeutic exercise, Endurance training, Cognitive reorientation, Patient/family training, Bed mobility, Spoke to nursing          See flowsheet documentation for full assessment, interventions and recommendations  Outcome: Progressing  Prognosis: Guarded  Problem List: Decreased strength, Decreased range of motion, Decreased endurance, Decreased mobility, Pain  Assessment: Pt is limited today due to L knee pain  Encouraged to complete additional exercises while lying supine however declined due to pain  RN Michael Chau aware of L knee pain  Pt would benefit from continued physical therapy to maximize functional mobility and safety  Barriers to Discharge: None     Recommendation: Post acute IP rehab     PT - OK to Discharge: Yes (rehab)    See flowsheet documentation for full assessment

## 2018-08-07 NOTE — CONSULTS
Patient seen by wound care, wound care placed orders for sacro-buttocks wounds and orders reviewed with primary RN  Spoke with RN Marcia Silverman about new consult, RN aware wound care is following and orders are in place  If patient remains admitted, we will continue following with skin re-assessment and wound care      Brynn Guerra RN, BSN, [facility]

## 2018-08-07 NOTE — ASSESSMENT & PLAN NOTE
Patient was noted to have abnormal body temperature yesterday night, 100 degree F  Patient is limited historian secondary to possible underlying memory loss/cognitive impairment  Denies cough, chest pain or shortness of breath  Unclear if urinary symptoms at this time  Urinalysis suggestive of urinary tract infection, white count is elevated, start Rocephin, no previous urine cultures are available in our system, send for urine cultures  Chest x-ray suggestive of bilateral atelectasis versus infection, pneumonia less likely, fever might be related also to atelectasis, start incentive spirometer and mobilized out of the bed if able

## 2018-08-07 NOTE — SOCIAL WORK
Discussed with SLIM during care coordination rounds  Patient is medically cleared for dc to SNF  She is approved by [facility] and bed is available  Spoke to [facility] who gave auth # [id number] for 7 days Level I with review due 7/14 to [facility], fax# [phone]  Scheduled BLS transport with [facility] for 1600 pickup and faxed request for auth to [facility]  MD, RN and SNF aware of transport time  Called son, Kayla Silver, to discuss discharge and informed MD of son's request for phone call

## 2018-08-07 NOTE — ASSESSMENT & PLAN NOTE
Present on admission, reported by nurse, stage II as well as unstageable, without clear signs of infection    Continue with local care, consult wound care

## 2018-08-07 NOTE — PROGRESS NOTES
Progress Note - Matias Kwan 1935, 81 y o  female MRN: [id number]    Unit/Bed#: [facility] 509-01 Encounter: [id number]    Primary Care Provider: Heather Berger MD   Date and time admitted to hospital: 8/2/2018  3:04 PM        Sacral decubitus ulcer   Assessment & Plan    Present on admission, reported by nurse, stage II as well as unstageable, without clear signs of infection  Continue with local care, consult wound care        Iron deficiency anemia due to chronic blood loss   Assessment & Plan    She needs iron treatment at home        GI bleed   Assessment & Plan    Patient was admitted for black tarry stool, found to have hemoglobin of 5 2  Saved to blood transfusion, hemoglobin currently stable around 8 6  She was evaluated by gastroenterologist, found to have gastric ulcer, she will be continued on b i d  PPI for now until evaluated by Gastroenterology as an outpatient, hold aspirin until further evaluation by cardiologist as outpatient  CBC in the morning        CKD (chronic kidney disease) stage 3, GFR 30-59 ml/min   Assessment & Plan    Patient has chronic kidney disease seems to be stable at this point although appears to be dehydrated  Current creatinine may also be misleading due to patient's nutritional status  Stable now        Acute blood loss anemia   Assessment & Plan    Please refer to GI bleed, patient hemoglobin is currently stable around 8 6, 1 value below 8 was noted, likely laboratory mistake  She will be started on p o  iron supplement upon discharge               Essential hypertension   Assessment & Plan    Continue with beta-blocker and monitoring        Decubitus ulcer of buttock, stage 2   Assessment & Plan    Wound Care to see patient  Turn patient every 2 hours          Type 2 diabetes mellitus with diabetic polyneuropathy, without long-term current use of insulin [facility])   Assessment & Plan    Lab Results   Component Value Date    HGBA1C 7 6 (H) 08/02/2018       Recent Labs 18   1616  18   2055  18   0640  18   1110   POCGLU  176*  205*  127  200*       Blood Sugar Average: Last 72 hrs:  (P) 239 5     Continue with current insulin regimen and monitor        * Abnormal body temperature   Assessment & Plan    Patient was noted to have abnormal body temperature yesterday night, 100 degree F  Patient is limited historian secondary to possible underlying memory loss/cognitive impairment  Denies cough, chest pain or shortness of breath  Unclear if urinary symptoms at this time  Urinalysis suggestive of urinary tract infection, white count is elevated, start Rocephin, no previous urine cultures are available in our system, send for urine cultures  Chest x-ray suggestive of bilateral atelectasis versus infection, pneumonia less likely, fever might be related also to atelectasis, start incentive spirometer and mobilized out of the bed if able          VTE Pharmacologic Prophylaxis: yes  Pharmacologic: Heparin  Mechanical VTE Prophylaxis in Place: Yes     Patient Centered Rounds: I have performed bedside rounds with nursing staff today      Discussions with Specialists or Other Care Team Provider:  none today     Education and Discussions with Family / Patient: patient, son Kurtis Spivey over the phone     Time Spent for Care: 45 minutes  More than 50% of total time spent on counseling and coordination of care as described above      Current Length of Stay: 5 day(s)     Current Patient Status: Inpatient   Certification Statement: monitor hemoglobin, PT/OT  Discharge Plan: tomorrow        Code Status: Level 1 - Full Code    Subjective:   Patient is feeling well  She has no complaints for me    A upon questioning her she appears very limited historian    Objective:     Vitals:   Temp (24hrs), Av 1 °F (37 3 °C), Min:98 5 °F (36 9 °C), Max:100 °F (37 8 °C)    HR:  [66-79] 73  Resp:  [18-20] 20  BP: (115-163)/(56-74) 137/62  SpO2:  [93 %-96 %] 93 %  Body mass index is 38 47 kg/m²  Input and Output Summary (last 24 hours): Intake/Output Summary (Last 24 hours) at 08/07/18 1703  Last data filed at 08/07/18 1022   Gross per 24 hour   Intake              120 ml   Output             1150 ml   Net            -1030 ml       Physical Exam:     Physical Exam   Constitutional: She is oriented to person, place, and time  She appears well-developed  Cardiovascular: Normal rate, regular rhythm and normal heart sounds  Exam reveals no friction rub  No murmur heard  Pulmonary/Chest: Effort normal  No respiratory distress  She has no wheezes  She has no rales  Abdominal: Soft  She exhibits no distension  There is no tenderness  There is no rebound  Musculoskeletal: She exhibits no edema  Neurological: She is alert and oriented to person, place, and time  She exhibits normal muscle tone  Skin: Skin is warm  Psychiatric: She has a normal mood and affect  Additional Data:     Labs:      Results from last 7 days  Lab Units 08/07/18  0424   WBC Thousand/uL 10 71*   HEMOGLOBIN g/dL 8 6*   HEMATOCRIT % 27 4*   PLATELETS Thousands/uL 515*   NEUTROS PCT % 70   LYMPHS PCT % 13*   MONOS PCT % 11   EOS PCT % 3       Results from last 7 days  Lab Units 08/07/18  0424  08/02/18  1604   SODIUM mmol/L 139  < > 134*   POTASSIUM mmol/L 3 7  < > 4 7   CHLORIDE mmol/L 108  < > 103   CO2 mmol/L 24  < > 23   BUN mg/dL 12  < > 55*   CREATININE mg/dL 0 93  < > 1 43*   CALCIUM mg/dL 8 2*  < > 8 9   TOTAL PROTEIN g/dL  --   --  6 5   BILIRUBIN TOTAL mg/dL  --   --  0 35   ALK PHOS U/L  --   --  109   ALT U/L  --   --  32   AST U/L  --   --  30   GLUCOSE RANDOM mg/dL 114  < > 317*   < > = values in this interval not displayed  Results from last 7 days  Lab Units 08/06/18  0507   INR  1 13       * I Have Reviewed All Lab Data Listed Above  * Additional Pertinent Lab Tests Reviewed:  All Labs Within Last 24 Hours Reviewed    Imaging:    Imaging Reports Reviewed Today Include:  Chest x-ray  Imaging Personally Reviewed by Myself Includes:  None    Recent Cultures (last 7 days):           Last 24 Hours Medication List:     Current Facility-Administered Medications:  bimatoprost 1 drop Both Eyes HS Justus Back MD   cefTRIAXone 1,000 mg Intravenous Q24H Seth Toth MD   cholecalciferol 2,000 Units Oral Daily Justus Back MD   docusate sodium 100 mg Oral BID Justus Back MD   dorzolamide-timolol 1 drop Both Eyes BID Justus Back MD   ferrous gluconate 324 mg Oral BID AC Seth Toth MD   heparin (porcine) 5,000 Units Subcutaneous [facility] Jaquelin Zelaya MD   insulin glargine 15 Units Subcutaneous HS Jaquelin Zelaya MD   insulin lispro 1-6 Units Subcutaneous TID With Meals Lisa Barrera PA-C   insulin lispro 5 Units Subcutaneous TID With Meals Jaquelin Zelaya MD   levothyroxine 75 mcg Oral Daily Justus Back MD   metoprolol tartrate 25 mg Oral Q12H [facility] HOME Justus Back MD   nystatin  Topical BID Justus Back MD   ondansetron 4 mg Intravenous Q6H PRN Justus Back MD   pantoprazole 40 mg Intravenous Q12H [facility] HOME Kristie Avila PA-C   polyethylene glycol 17 g Oral Daily Justus Back MD   pravastatin 40 mg Oral Daily With Matthieu Gilmore MD   prednisoLONE acetate 1 drop Both Eyes BID Justus Back MD        Today, Patient Was Seen By: Seth Toth MD    ** Please Note: Dragon 360 Dictation voice to text software may have been used in the creation of this document   **

## 2018-08-07 NOTE — SOCIAL WORK
Message received from [facility] with auth # -[id number] for [facility]  Informed by Dr Vivien Sandy that patient is not cleared for discharge today  Informed [facility] and called [facility] to cancel todays trip  Scheduled BLS transport for 11:30 Wed, 8/8/18 and faxed CMN to [facility]  Informed snf and Shameka Reilly of dc date change

## 2018-08-07 NOTE — PLAN OF CARE
Problem: PAIN - ADULT  Goal: Verbalizes/displays adequate comfort level or baseline comfort level  Interventions:  - Encourage patient to monitor pain and request assistance  - Assess pain using appropriate pain scale  - Administer analgesics based on type and severity of pain and evaluate response  - Implement non-pharmacological measures as appropriate and evaluate response  - Consider cultural and social influences on pain and pain management  - Notify physician/advanced practitioner if interventions unsuccessful or patient reports new pain   Outcome: Progressing      Problem: INFECTION - ADULT  Goal: Absence or prevention of progression during hospitalization  INTERVENTIONS:  - Assess and monitor for signs and symptoms of infection  - Monitor lab/diagnostic results  - Monitor all insertion sites, i e  indwelling lines, tubes, and drains  - Monitor endotracheal (as able) and nasal secretions for changes in amount and color  - [place] appropriate cooling/warming therapies per order  - Administer medications as ordered  - Instruct and encourage patient and family to use good hand hygiene technique  - Identify and instruct in appropriate isolation precautions for identified infection/condition   Outcome: Progressing    Goal: Absence of fever/infection during neutropenic period  INTERVENTIONS:  - Monitor WBC  - Implement neutropenic guidelines   Outcome: Progressing      Problem: SAFETY ADULT  Goal: Patient will remain free of falls  INTERVENTIONS:  - Assess patient frequently for physical needs  -  Identify cognitive and physical deficits and behaviors that affect risk of falls    -  [place] fall precautions as indicated by assessment   - Educate patient/family on patient safety including physical limitations  - Instruct patient to call for assistance with activity based on assessment  - Modify environment to reduce risk of injury  - Consider OT/PT consult to assist with strengthening/mobility    Outcome: Progressing    Goal: Maintain or return to baseline ADL function  INTERVENTIONS:  -  Assess patient's ability to carry out ADLs; assess patient's baseline for ADL function and identify physical deficits which impact ability to perform ADLs (bathing, care of mouth/teeth, toileting, grooming, dressing, etc )  - Assess/evaluate cause of self-care deficits   - Assess range of motion  - Assess patient's mobility; develop plan if impaired  - Assess patient's need for assistive devices and provide as appropriate  - Encourage maximum independence but intervene and supervise when necessary  ¯ Involve family in performance of ADLs  ¯ Assess for home care needs following discharge   ¯ Request OT consult to assist with ADL evaluation and planning for discharge  ¯ Provide patient education as appropriate   Outcome: Progressing    Goal: Maintain or return mobility status to optimal level  INTERVENTIONS:  - Assess patient's baseline mobility status (ambulation, transfers, stairs, etc )    - Identify cognitive and physical deficits and behaviors that affect mobility  - Identify mobility aids required to assist with transfers and/or ambulation (gait belt, sit-to-stand, lift, walker, cane, etc )  - [place] fall precautions as indicated by assessment  - Record patient progress and toleration of activity level on Mobility SBAR; progress patient to next Phase/Stage  - Instruct patient to call for assistance with activity based on assessment  - Request Rehabilitation consult to assist with strengthening/weightbearing, etc    Outcome: Progressing      Problem: DISCHARGE PLANNING  Goal: Discharge to home or other facility with appropriate resources  INTERVENTIONS:  - Identify barriers to discharge w/patient and caregiver  - Arrange for needed discharge resources and transportation as appropriate  - Identify discharge learning needs (meds, wound care, etc )  - Arrange for interpretive services to assist at discharge as needed  - Refer to Case Management Department for coordinating discharge planning if the patient needs post-hospital services based on physician/advanced practitioner order or complex needs related to functional status, cognitive ability, or social support system   Outcome: Progressing      Problem: Knowledge Deficit  Goal: Patient/family/caregiver demonstrates understanding of disease process, treatment plan, medications, and discharge instructions  Complete learning assessment and assess knowledge base  Interventions:  - Provide teaching at level of understanding  - Provide teaching via preferred learning methods   Outcome: Progressing      Problem: Nutrition/Hydration-ADULT  Goal: Nutrient/Hydration intake appropriate for improving, restoring or maintaining nutritional needs  Monitor and assess patient's nutrition/hydration status for malnutrition (ex- brittle hair, bruises, dry skin, pale skin and conjunctiva, muscle wasting, smooth red tongue, and disorientation)  Collaborate with interdisciplinary team and initiate plan and interventions as ordered  Monitor patient's weight and dietary intake as ordered or per policy  Utilize nutrition screening tool and intervene per policy  Determine patient's food preferences and provide high-protein, high-caloric foods as appropriate       INTERVENTIONS:  - Monitor oral intake, urinary output, labs, and treatment plans  - Assess nutrition and hydration status and recommend course of action  - Evaluate amount of meals eaten  - Assist patient with eating if necessary   - Allow adequate time for meals  - Recommend/ encourage appropriate diets, oral nutritional supplements, and vitamin/mineral supplements  - Order, calculate, and assess calorie counts as needed  - Recommend, monitor, and adjust tube feedings and TPN/PPN based on assessed needs  - Assess need for intravenous fluids  - Provide specific nutrition/hydration education as appropriate  - Include patient/family/caregiver in decisions related to nutrition    Outcome: Progressing      Problem: DISCHARGE PLANNING - CARE MANAGEMENT  Goal: Discharge to post-acute care or home with appropriate resources  INTERVENTIONS:  - Conduct assessment to determine patient/family and health care team treatment goals, and need for post-acute services based on payer coverage, community resources, and patient preferences, and barriers to discharge  - Address psychosocial, clinical, and financial barriers to discharge as identified in assessment in conjunction with the patient/family and health care team  - Arrange appropriate level of post-acute services according to patient's   needs and preference and payer coverage in collaboration with the physician and health care team  - Communicate with and update the patient/family, physician, and health care team regarding progress on the discharge plan  - Arrange appropriate transportation to post-acute venues   INTERVENTIONS:  - Conduct assessment to determine patient/family and health care team treatment goals, and need for post-acute services based on payer coverage, community resources, and patient preferences, and barriers to discharge  - Address psychosocial, clinical, and financial barriers to discharge as identified in assessment in conjunction with the patient/family and health care team  - Arrange appropriate level of post-acute services according to patient's   needs and preference and payer coverage in collaboration with the physician and health care team  - Communicate with and update the patient/family, physician, and health care team regarding progress on the discharge plan  - Arrange appropriate transportation to post-acute venues  - Complete referral to [facility] snf to resume rehab and obtain insurance authorization     Outcome: Progressing      Problem: Prexisting or High Potential for Compromised Skin Integrity  Goal: Skin integrity is maintained or improved  INTERVENTIONS:  - Identify patients at risk for skin breakdown  - Assess and monitor skin integrity  - Assess and monitor nutrition and hydration status  - Monitor labs (i e  albumin)  - Assess for incontinence   - Turn and reposition patient  - Assist with mobility/ambulation  - Relieve pressure over bony prominences  - Avoid friction and shearing  - Provide appropriate hygiene as needed including keeping skin clean and dry  - Evaluate need for skin moisturizer/barrier cream  - Collaborate with interdisciplinary team (i e  Nutrition, Rehabilitation, etc )   - Patient/family teaching   Outcome: Progressing      Problem: Potential for Falls  Goal: Patient will remain free of falls  INTERVENTIONS:  - Assess patient frequently for physical needs  -  Identify cognitive and physical deficits and behaviors that affect risk of falls    -  [place] fall precautions as indicated by assessment   - Educate patient/family on patient safety including physical limitations  - Instruct patient to call for assistance with activity based on assessment  - Modify environment to reduce risk of injury  - Consider OT/PT consult to assist with strengthening/mobility    Outcome: Progressing

## 2018-08-08 ENCOUNTER — APPOINTMENT (INPATIENT)
Dept: RADIOLOGY | Facility: HOSPITAL | Age: 83
DRG: 378 | End: 2018-08-08
Payer: COMMERCIAL

## 2018-08-08 PROBLEM — R93.89 ABNORMAL CT OF THE CHEST: Status: ACTIVE | Noted: 2018-08-08

## 2018-08-08 LAB
ANION GAP SERPL CALCULATED.3IONS-SCNC: 7 MMOL/L (ref 4–13)
BASOPHILS # BLD AUTO: 0.07 THOUSANDS/ΜL (ref 0–0.1)
BASOPHILS NFR BLD AUTO: 1 % (ref 0–1)
BUN SERPL-MCNC: 12 MG/DL (ref 5–25)
CALCIUM SERPL-MCNC: 8.3 MG/DL (ref 8.3–10.1)
CHLORIDE SERPL-SCNC: 107 MMOL/L (ref 100–108)
CO2 SERPL-SCNC: 25 MMOL/L (ref 21–32)
CREAT SERPL-MCNC: 1.03 MG/DL (ref 0.6–1.3)
EOSINOPHIL # BLD AUTO: 0.16 THOUSAND/ΜL (ref 0–0.61)
EOSINOPHIL NFR BLD AUTO: 1 % (ref 0–6)
ERYTHROCYTE [DISTWIDTH] IN BLOOD BY AUTOMATED COUNT: 14.6 % (ref 11.6–15.1)
GFR SERPL CREATININE-BSD FRML MDRD: 51 ML/MIN/1.73SQ M
GLUCOSE SERPL-MCNC: 109 MG/DL (ref 65–140)
GLUCOSE SERPL-MCNC: 110 MG/DL (ref 65–140)
GLUCOSE SERPL-MCNC: 160 MG/DL (ref 65–140)
GLUCOSE SERPL-MCNC: 228 MG/DL (ref 65–140)
GLUCOSE SERPL-MCNC: 246 MG/DL (ref 65–140)
HCT VFR BLD AUTO: 26.9 % (ref 34.8–46.1)
HGB BLD-MCNC: 8.4 G/DL (ref 11.5–15.4)
IMM GRANULOCYTES # BLD AUTO: 0.22 THOUSAND/UL (ref 0–0.2)
IMM GRANULOCYTES NFR BLD AUTO: 2 % (ref 0–2)
LYMPHOCYTES # BLD AUTO: 1.23 THOUSANDS/ΜL (ref 0.6–4.47)
LYMPHOCYTES NFR BLD AUTO: 9 % (ref 14–44)
MCH RBC QN AUTO: 28.5 PG (ref 26.8–34.3)
MCHC RBC AUTO-ENTMCNC: 31.2 G/DL (ref 31.4–37.4)
MCV RBC AUTO: 91 FL (ref 82–98)
MONOCYTES # BLD AUTO: 1.67 THOUSAND/ΜL (ref 0.17–1.22)
MONOCYTES NFR BLD AUTO: 12 % (ref 4–12)
NEUTROPHILS # BLD AUTO: 11.06 THOUSANDS/ΜL (ref 1.85–7.62)
NEUTS SEG NFR BLD AUTO: 75 % (ref 43–75)
NRBC BLD AUTO-RTO: 0 /100 WBCS
PHOSPHATE SERPL-MCNC: 2.6 MG/DL (ref 2.3–4.1)
PLATELET # BLD AUTO: 481 THOUSANDS/UL (ref 149–390)
PMV BLD AUTO: 9.5 FL (ref 8.9–12.7)
POTASSIUM SERPL-SCNC: 3.9 MMOL/L (ref 3.5–5.3)
PROCALCITONIN SERPL-MCNC: 0.93 NG/ML
RBC # BLD AUTO: 2.95 MILLION/UL (ref 3.81–5.12)
SODIUM SERPL-SCNC: 139 MMOL/L (ref 136–145)
WBC # BLD AUTO: 14.41 THOUSAND/UL (ref 4.31–10.16)

## 2018-08-08 PROCEDURE — 99232 SBSQ HOSP IP/OBS MODERATE 35: CPT | Performed by: INTERNAL MEDICINE

## 2018-08-08 PROCEDURE — 80048 BASIC METABOLIC PNL TOTAL CA: CPT | Performed by: INTERNAL MEDICINE

## 2018-08-08 PROCEDURE — 85025 COMPLETE CBC W/AUTO DIFF WBC: CPT | Performed by: INTERNAL MEDICINE

## 2018-08-08 PROCEDURE — 84145 PROCALCITONIN (PCT): CPT | Performed by: INTERNAL MEDICINE

## 2018-08-08 PROCEDURE — C9113 INJ PANTOPRAZOLE SODIUM, VIA: HCPCS | Performed by: PHYSICIAN ASSISTANT

## 2018-08-08 PROCEDURE — 84100 ASSAY OF PHOSPHORUS: CPT | Performed by: INTERNAL MEDICINE

## 2018-08-08 PROCEDURE — 71250 CT THORAX DX C-: CPT

## 2018-08-08 PROCEDURE — 82948 REAGENT STRIP/BLOOD GLUCOSE: CPT

## 2018-08-08 RX ORDER — ACETAMINOPHEN 325 MG/1
650 TABLET ORAL EVERY 6 HOURS PRN
Status: DISCONTINUED | OUTPATIENT
Start: 2018-08-08 | End: 2018-08-10 | Stop reason: HOSPADM

## 2018-08-08 RX ADMIN — HEPARIN SODIUM 5000 UNITS: 5000 INJECTION, SOLUTION INTRAVENOUS; SUBCUTANEOUS at 05:23

## 2018-08-08 RX ADMIN — NYSTATIN: 100000 POWDER TOPICAL at 09:42

## 2018-08-08 RX ADMIN — INSULIN LISPRO 5 UNITS: 100 INJECTION, SOLUTION INTRAVENOUS; SUBCUTANEOUS at 09:29

## 2018-08-08 RX ADMIN — VITAMIN D, TAB 1000IU (100/BT) 2000 UNITS: 25 TAB at 09:28

## 2018-08-08 RX ADMIN — LEVOTHYROXINE SODIUM 75 MCG: 75 TABLET ORAL at 05:23

## 2018-08-08 RX ADMIN — PANTOPRAZOLE SODIUM 40 MG: 40 INJECTION, POWDER, FOR SOLUTION INTRAVENOUS at 09:28

## 2018-08-08 RX ADMIN — INSULIN LISPRO 3 UNITS: 100 INJECTION, SOLUTION INTRAVENOUS; SUBCUTANEOUS at 13:13

## 2018-08-08 RX ADMIN — METOPROLOL TARTRATE 25 MG: 25 TABLET ORAL at 21:27

## 2018-08-08 RX ADMIN — PANTOPRAZOLE SODIUM 40 MG: 40 INJECTION, POWDER, FOR SOLUTION INTRAVENOUS at 21:26

## 2018-08-08 RX ADMIN — DOCUSATE SODIUM 100 MG: 100 CAPSULE, LIQUID FILLED ORAL at 17:28

## 2018-08-08 RX ADMIN — DORZOLAMIDE HYDROCHLORIDE AND TIMOLOL MALEATE 1 DROP: 20; 5 SOLUTION/ DROPS OPHTHALMIC at 09:30

## 2018-08-08 RX ADMIN — FERROUS GLUCONATE 324 MG: 324 TABLET ORAL at 15:47

## 2018-08-08 RX ADMIN — HEPARIN SODIUM 5000 UNITS: 5000 INJECTION, SOLUTION INTRAVENOUS; SUBCUTANEOUS at 13:15

## 2018-08-08 RX ADMIN — DOCUSATE SODIUM 100 MG: 100 CAPSULE, LIQUID FILLED ORAL at 09:28

## 2018-08-08 RX ADMIN — INSULIN LISPRO 5 UNITS: 100 INJECTION, SOLUTION INTRAVENOUS; SUBCUTANEOUS at 15:45

## 2018-08-08 RX ADMIN — FERROUS GLUCONATE 324 MG: 324 TABLET ORAL at 09:29

## 2018-08-08 RX ADMIN — INSULIN LISPRO 1 UNITS: 100 INJECTION, SOLUTION INTRAVENOUS; SUBCUTANEOUS at 17:29

## 2018-08-08 RX ADMIN — PREDNISOLONE ACETATE 1 DROP: 10 SUSPENSION/ DROPS OPHTHALMIC at 09:30

## 2018-08-08 RX ADMIN — HEPARIN SODIUM 5000 UNITS: 5000 INJECTION, SOLUTION INTRAVENOUS; SUBCUTANEOUS at 21:26

## 2018-08-08 RX ADMIN — METOPROLOL TARTRATE 25 MG: 25 TABLET ORAL at 09:28

## 2018-08-08 RX ADMIN — PRAVASTATIN SODIUM 40 MG: 40 TABLET ORAL at 15:46

## 2018-08-08 RX ADMIN — INSULIN GLARGINE 15 UNITS: 100 INJECTION, SOLUTION SUBCUTANEOUS at 21:27

## 2018-08-08 RX ADMIN — NYSTATIN 1 APPLICATION: 100000 POWDER TOPICAL at 17:29

## 2018-08-08 RX ADMIN — CEFTRIAXONE 1000 MG: 1 INJECTION, POWDER, FOR SOLUTION INTRAMUSCULAR; INTRAVENOUS at 17:25

## 2018-08-08 RX ADMIN — INSULIN LISPRO 5 UNITS: 100 INJECTION, SOLUTION INTRAVENOUS; SUBCUTANEOUS at 13:13

## 2018-08-08 RX ADMIN — PREDNISOLONE ACETATE 1 DROP: 10 SUSPENSION/ DROPS OPHTHALMIC at 17:29

## 2018-08-08 RX ADMIN — BIMATOPROST 1 DROP: 0.1 SOLUTION/ DROPS OPHTHALMIC at 21:31

## 2018-08-08 RX ADMIN — ACETAMINOPHEN 650 MG: 325 TABLET, FILM COATED ORAL at 13:16

## 2018-08-08 RX ADMIN — POLYETHYLENE GLYCOL 3350 17 G: 17 POWDER, FOR SOLUTION ORAL at 09:28

## 2018-08-08 NOTE — ASSESSMENT & PLAN NOTE
Discussed with good wound care, for superficial, good healing potential, infection noted, appropriate orders placed by wound care nurse

## 2018-08-08 NOTE — ASSESSMENT & PLAN NOTE
Patient was noted to have abnormal body temperature August 6 night, 100 degree F  Patient is limited historian secondary to possible underlying memory loss/cognitive impairment  Denies cough, chest pain or shortness of breath  Unclear if urinary symptoms at this time  Urinalysis suggestive of urinary tract infection, white count is elevated  Pending urine culture, continue with Rocephin, procalcitonin elevated at 0 96, repeat in the morning   Chest x-ray suggestive of bilateral atelectasis versus infection, pneumonia less likely, fever might be related also to atelectasis, continue with incentive spirometer and mobilized out of the bed if able  Patient is a very poor historian CT of the chest without contrast obtained, no infiltrates suggesting pneumonia at this time

## 2018-08-08 NOTE — ASSESSMENT & PLAN NOTE
Bilateral multiple pulmonary nodule, suggest to repeat CT scan in 12 months as an outpatient, also liver cyst was noted, stable over time, follow-up as outpatient with primary care physician

## 2018-08-08 NOTE — PHYSICAL THERAPY NOTE
Physical Therapy Cancellation Note    Chart review performed  At this time, PT treatment session cancelled secondary to pt refusal  Pt reporting 7/10 pain to R knee- RN notified  PT explained the benefits of mobility and exercise activities in the prevention of secondary complications of immobility  PT will follow and provide PT interventions as appropriate      Toribio Johnson, PT, DPT

## 2018-08-08 NOTE — ASSESSMENT & PLAN NOTE
Please refer to GI bleed, patient hemoglobin is currently stable around 8 4, 1 value below 7 was noted few days ago, isolated, likely laboratory mistake  She will be started on p o  iron supplement upon discharge

## 2018-08-08 NOTE — OCCUPATIONAL THERAPY NOTE
Occupational Therapy     Occupational Therapy Cancellation Notice     OT orders received and chart review completed, attempted to see pt for OT treatment session today however pt declining to participate in therapy at this time  despite education and encouragment  Will continue to follow and attempt to see pt at another time when agreeable and medically appropriate         Barrington Malone MS , OTR/L

## 2018-08-08 NOTE — SOCIAL WORK
Informed by Dr Alvin Shipman that patient is not medically cleared for dc to snf  Left message for [facility] and [facility]  Called [facility] to cancel transport

## 2018-08-08 NOTE — ASSESSMENT & PLAN NOTE
Patient was admitted for black tarry stool, found to have hemoglobin of 5 2  Saved to blood transfusion, hemoglobin currently stable around 8 4  She was evaluated by gastroenterologist, found to have gastric ulcer, she will be continued on b i d   PPI for now until evaluated by Gastroenterology as an outpatient, hold aspirin until further evaluation by cardiologist as outpatient  CBC in the morning

## 2018-08-08 NOTE — OCCUPATIONAL THERAPY NOTE
Occupational Therapy Cancellation Notice     OT orders received and chart review completed, attempted to see pt for OT treatment session today however pt eating breakfast and requesting to participate in PM  Will continue to follow and attempt to see pt in PM        Chantell Rivera MS , OTR/L

## 2018-08-08 NOTE — PROGRESS NOTES
Progress Note - Luz Morales 1935, 81 y o  female MRN: [id number]    Unit/Bed#: [facility] 509-01 Encounter: [id number]    Primary Care Provider: Lanny Eid MD   Date and time admitted to hospital: 8/2/2018  3:04 PM        Abnormal CT of the chest   Assessment & Plan    Bilateral multiple pulmonary nodule, suggest to repeat CT scan in 12 months as an outpatient, also liver cyst was noted, stable over time, follow-up as outpatient with primary care physician        Sacral decubitus ulcer   Assessment & Plan    Present on admission, reported by nurse, unstageable, evaluated by wound nurse, continue care, no indication or need for debridement        GI bleed   Assessment & Plan    Patient was admitted for black tarry stool, found to have hemoglobin of 5 2  Saved to blood transfusion, hemoglobin currently stable around 8 4  She was evaluated by gastroenterologist, found to have gastric ulcer, she will be continued on b i d  PPI for now until evaluated by Gastroenterology as an outpatient, hold aspirin until further evaluation by cardiologist as outpatient  CBC in the morning        CKD (chronic kidney disease) stage 3, GFR 30-59 ml/min   Assessment & Plan    Patient creatinine appears to be around her baseline    No concerns at present        Acute blood loss anemia   Assessment & Plan    Please refer to GI bleed, patient hemoglobin is currently stable around 8 4, 1 value below 7 was noted few days ago, isolated, likely laboratory mistake  She will be started on p o  iron supplement upon discharge               Essential hypertension   Assessment & Plan    Continue with beta-blocker and monitoring        Decubitus ulcer of buttock, stage 2   Assessment & Plan    Discussed with good wound care, for superficial, good healing potential, infection noted, appropriate orders placed by wound care nurse        Type 2 diabetes mellitus with diabetic polyneuropathy, without long-term current use of insulin ([facility] [address] )   Assessment & Plan    Lab Results   Component Value Date    HGBA1C 7 6 (H) 08/02/2018       Recent Labs      08/07/18   1614  08/07/18   2100  08/08/18   0626  08/08/18   1056   POCGLU  124  150*  109  246*       Blood Sugar Average: Last 72 hrs:  (P) 164 6     Continue with current insulin regimen and monitor        * Abnormal body temperature   Assessment & Plan    Patient was noted to have abnormal body temperature August 6 night, 100 degree F  Patient is limited historian secondary to possible underlying memory loss/cognitive impairment  Denies cough, chest pain or shortness of breath  Unclear if urinary symptoms at this time  Urinalysis suggestive of urinary tract infection, white count is elevated  Pending urine culture, continue with Rocephin, procalcitonin elevated at 0 96, repeat in the morning   Chest x-ray suggestive of bilateral atelectasis versus infection, pneumonia less likely, fever might be related also to atelectasis, continue with incentive spirometer and mobilized out of the bed if able  Patient is a very poor historian CT of the chest without contrast obtained, no infiltrates suggesting pneumonia at this time          VTE Pharmacologic Prophylaxis: yes  Pharmacologic: Heparin  Mechanical VTE Prophylaxis in Place: Yes     Patient Centered Rounds: I have performed bedside rounds with nursing staff today      Discussions with Specialists or Other Care Team Provider:  None     Education and Discussions with Family / Jeri Avalos, son Mague Boateng over the phone     Time Spent for Care: 30 minutes   More than 50% of total time spent on counseling and coordination of care as described above      Current Length of Stay: 6 day(s)     Current Patient Status: Inpatient   Certification Statement: monitor hemoglobin, PT/OT    Discharge Plan:   Possibly tomorrow if WBC improves and urine culture available with results      Code Status: Level 1 - Full Code    Subjective:   Patient is feeling well today    She has no complaints or concerns  She is very quiet which according to son his baseline  Always concern that the patient might be not forthcoming with complaints  Objective:     Vitals:   Temp (24hrs), Av 2 °F (37 3 °C), Min:98 8 °F (37 1 °C), Max:99 4 °F (37 4 °C)    HR:  [78-93] 89  Resp:  [18-20] 20  BP: (131-137)/(62-72) 131/72  SpO2:  [94 %-96 %] 96 %  Body mass index is 38 41 kg/m²  Input and Output Summary (last 24 hours): Intake/Output Summary (Last 24 hours) at 18 1620  Last data filed at 18 1218   Gross per 24 hour   Intake              310 ml   Output              575 ml   Net             -265 ml       Physical Exam:     Physical Exam   Constitutional: She is oriented to person, place, and time  She appears well-developed  Cardiovascular: Normal rate, regular rhythm and normal heart sounds  Exam reveals no friction rub  No murmur heard  Pulmonary/Chest: Effort normal  No respiratory distress  She has no wheezes  She has no rales  Abdominal: Soft  She exhibits no distension  There is no tenderness  There is no rebound  Musculoskeletal: She exhibits no edema  Neurological: She is alert and oriented to person, place, and time  She exhibits normal muscle tone  Skin: Skin is warm  Psychiatric: She has a normal mood and affect         Additional Data:     Labs:      Results from last 7 days  Lab Units 18  0458   WBC Thousand/uL 14 41*   HEMOGLOBIN g/dL 8 4*   HEMATOCRIT % 26 9*   PLATELETS Thousands/uL 481*   NEUTROS PCT % 75   LYMPHS PCT % 9*   MONOS PCT % 12   EOS PCT % 1       Results from last 7 days  Lab Units 18  0458  18  1604   SODIUM mmol/L 139  < > 134*   POTASSIUM mmol/L 3 9  < > 4 7   CHLORIDE mmol/L 107  < > 103   CO2 mmol/L 25  < > 23   BUN mg/dL 12  < > 55*   CREATININE mg/dL 1 03  < > 1 43*   CALCIUM mg/dL 8 3  < > 8 9   TOTAL PROTEIN g/dL  --   --  6 5   BILIRUBIN TOTAL mg/dL  --   --  0 35   ALK PHOS U/L  --   --  109   ALT U/L  --   -- 32   AST U/L  --   --  30   GLUCOSE RANDOM mg/dL 110  < > 317*   < > = values in this interval not displayed  Results from last 7 days  Lab Units 08/06/18  0507   INR  1 13       * I Have Reviewed All Lab Data Listed Above  * Additional Pertinent Lab Tests Reviewed: Dominique 67 Admission Reviewed    Imaging:    Imaging Reports Reviewed Today Include: none  Imaging Personally Reviewed by Myself Includes:  None    Recent Cultures (last 7 days):           Last 24 Hours Medication List:     Current Facility-Administered Medications:  acetaminophen 650 mg Oral Q6H PRN Flakita Oscar MD    bimatoprost 1 drop Both Eyes HS Jamaica Tellez MD    cefTRIAXone 1,000 mg Intravenous Q24H Flakita Oscar MD Last Rate: 1,000 mg (08/07/18 1816)   cholecalciferol 2,000 Units Oral Daily [place] Tellez MD    docusate sodium 100 mg Oral BID Jamaica Tellez MD    dorzolamide-timolol 1 drop Both Eyes BID Jamaica Tellez MD    ferrous gluconate 324 mg Oral BID AC Flakita Oscar MD    heparin (porcine) 5,000 Units Subcutaneous Q8H [address] Hermila Keith MD    insulin glargine 15 Units Subcutaneous HS Hermila Keith MD    insulin lispro 1-6 Units Subcutaneous TID With Meals Lisa Barrera PA-C    insulin lispro 5 Units Subcutaneous TID With Meals Hermila Keith MD    levothyroxine 75 mcg Oral Daily [place] Tellez MD    metoprolol tartrate 25 mg Oral Q12H [address] Jamaica Tellez MD    nystatin  Topical BID Jamaica Tellez MD    ondansetron 4 mg Intravenous Q6H PRN Jamaica Tellez MD    pantoprazole 40 mg Intravenous Q12H [address] Khris Morales PA-C    polyethylene glycol 17 g Oral Daily Jamaica Tellez MD    pravastatin 40 mg Oral Daily With Santana Holloway MD    prednisoLONE acetate 1 drop Both Eyes BID Jamaica Tellez MD         Today, Patient Was Seen By: Flakita Oscar MD    ** Please Note: Dragon 360 Dictation voice to text software may have been used in the creation of this document  **

## 2018-08-08 NOTE — RESTORATIVE TECHNICIAN NOTE
Restorative Specialist Mobility Note       Pt refusing OOB attempt at this time  Will continue to follow up daily  RN Rober Degree aware        Maribel Shay Restorative Technician BS

## 2018-08-08 NOTE — ASSESSMENT & PLAN NOTE
Present on admission, reported by nurse, unstageable, evaluated by wound nurse, continue care, no indication or need for debridement

## 2018-08-08 NOTE — ASSESSMENT & PLAN NOTE
Lab Results   Component Value Date    HGBA1C 7 6 (H) 08/02/2018       Recent Labs      08/07/18   1614  08/07/18   2100  08/08/18   0626  08/08/18   1056   POCGLU  124  150*  109  246*       Blood Sugar Average: Last 72 hrs:  (P) 164 6     Continue with current insulin regimen and monitor

## 2018-08-09 LAB
ANION GAP SERPL CALCULATED.3IONS-SCNC: 8 MMOL/L (ref 4–13)
BASOPHILS # BLD AUTO: 0.08 THOUSANDS/ΜL (ref 0–0.1)
BASOPHILS NFR BLD AUTO: 1 % (ref 0–1)
BUN SERPL-MCNC: 16 MG/DL (ref 5–25)
CALCIUM SERPL-MCNC: 8.4 MG/DL (ref 8.3–10.1)
CHLORIDE SERPL-SCNC: 104 MMOL/L (ref 100–108)
CO2 SERPL-SCNC: 25 MMOL/L (ref 21–32)
CREAT SERPL-MCNC: 1.24 MG/DL (ref 0.6–1.3)
EOSINOPHIL # BLD AUTO: 0.35 THOUSAND/ΜL (ref 0–0.61)
EOSINOPHIL NFR BLD AUTO: 3 % (ref 0–6)
ERYTHROCYTE [DISTWIDTH] IN BLOOD BY AUTOMATED COUNT: 14.6 % (ref 11.6–15.1)
GFR SERPL CREATININE-BSD FRML MDRD: 41 ML/MIN/1.73SQ M
GLUCOSE SERPL-MCNC: 121 MG/DL (ref 65–140)
GLUCOSE SERPL-MCNC: 124 MG/DL (ref 65–140)
GLUCOSE SERPL-MCNC: 148 MG/DL (ref 65–140)
GLUCOSE SERPL-MCNC: 157 MG/DL (ref 65–140)
GLUCOSE SERPL-MCNC: 203 MG/DL (ref 65–140)
GLUCOSE SERPL-MCNC: 264 MG/DL (ref 65–140)
HCT VFR BLD AUTO: 25.3 % (ref 34.8–46.1)
HCT VFR BLD AUTO: 26.6 % (ref 34.8–46.1)
HGB BLD-MCNC: 7.9 G/DL (ref 11.5–15.4)
HGB BLD-MCNC: 8.8 G/DL (ref 11.5–15.4)
IMM GRANULOCYTES # BLD AUTO: 0.13 THOUSAND/UL (ref 0–0.2)
IMM GRANULOCYTES NFR BLD AUTO: 1 % (ref 0–2)
LYMPHOCYTES # BLD AUTO: 1.03 THOUSANDS/ΜL (ref 0.6–4.47)
LYMPHOCYTES NFR BLD AUTO: 9 % (ref 14–44)
MAGNESIUM SERPL-MCNC: 1.7 MG/DL (ref 1.6–2.6)
MCH RBC QN AUTO: 28.4 PG (ref 26.8–34.3)
MCHC RBC AUTO-ENTMCNC: 31.2 G/DL (ref 31.4–37.4)
MCV RBC AUTO: 91 FL (ref 82–98)
MONOCYTES # BLD AUTO: 1.27 THOUSAND/ΜL (ref 0.17–1.22)
MONOCYTES NFR BLD AUTO: 11 % (ref 4–12)
NEUTROPHILS # BLD AUTO: 8.56 THOUSANDS/ΜL (ref 1.85–7.62)
NEUTS SEG NFR BLD AUTO: 75 % (ref 43–75)
NRBC BLD AUTO-RTO: 0 /100 WBCS
PHOSPHATE SERPL-MCNC: 2.8 MG/DL (ref 2.3–4.1)
PLATELET # BLD AUTO: 457 THOUSANDS/UL (ref 149–390)
PMV BLD AUTO: 10 FL (ref 8.9–12.7)
POTASSIUM SERPL-SCNC: 4 MMOL/L (ref 3.5–5.3)
PROCALCITONIN SERPL-MCNC: 1.67 NG/ML
RBC # BLD AUTO: 2.78 MILLION/UL (ref 3.81–5.12)
SODIUM SERPL-SCNC: 137 MMOL/L (ref 136–145)
WBC # BLD AUTO: 11.42 THOUSAND/UL (ref 4.31–10.16)

## 2018-08-09 PROCEDURE — 84145 PROCALCITONIN (PCT): CPT | Performed by: INTERNAL MEDICINE

## 2018-08-09 PROCEDURE — C9113 INJ PANTOPRAZOLE SODIUM, VIA: HCPCS | Performed by: PHYSICIAN ASSISTANT

## 2018-08-09 PROCEDURE — 85018 HEMOGLOBIN: CPT | Performed by: INTERNAL MEDICINE

## 2018-08-09 PROCEDURE — 97110 THERAPEUTIC EXERCISES: CPT

## 2018-08-09 PROCEDURE — 85014 HEMATOCRIT: CPT | Performed by: INTERNAL MEDICINE

## 2018-08-09 PROCEDURE — 82948 REAGENT STRIP/BLOOD GLUCOSE: CPT

## 2018-08-09 PROCEDURE — 83735 ASSAY OF MAGNESIUM: CPT | Performed by: INTERNAL MEDICINE

## 2018-08-09 PROCEDURE — 99232 SBSQ HOSP IP/OBS MODERATE 35: CPT | Performed by: INTERNAL MEDICINE

## 2018-08-09 PROCEDURE — 84100 ASSAY OF PHOSPHORUS: CPT | Performed by: INTERNAL MEDICINE

## 2018-08-09 PROCEDURE — 85025 COMPLETE CBC W/AUTO DIFF WBC: CPT | Performed by: INTERNAL MEDICINE

## 2018-08-09 PROCEDURE — 97530 THERAPEUTIC ACTIVITIES: CPT

## 2018-08-09 PROCEDURE — 80048 BASIC METABOLIC PNL TOTAL CA: CPT | Performed by: INTERNAL MEDICINE

## 2018-08-09 RX ADMIN — INSULIN LISPRO 2 UNITS: 100 INJECTION, SOLUTION INTRAVENOUS; SUBCUTANEOUS at 11:13

## 2018-08-09 RX ADMIN — ACETAMINOPHEN 650 MG: 325 TABLET, FILM COATED ORAL at 09:54

## 2018-08-09 RX ADMIN — INSULIN LISPRO 5 UNITS: 100 INJECTION, SOLUTION INTRAVENOUS; SUBCUTANEOUS at 17:20

## 2018-08-09 RX ADMIN — LEVOTHYROXINE SODIUM 75 MCG: 75 TABLET ORAL at 05:19

## 2018-08-09 RX ADMIN — INSULIN LISPRO 5 UNITS: 100 INJECTION, SOLUTION INTRAVENOUS; SUBCUTANEOUS at 11:14

## 2018-08-09 RX ADMIN — DORZOLAMIDE HYDROCHLORIDE AND TIMOLOL MALEATE 1 DROP: 20; 5 SOLUTION/ DROPS OPHTHALMIC at 17:20

## 2018-08-09 RX ADMIN — PREDNISOLONE ACETATE 1 DROP: 10 SUSPENSION/ DROPS OPHTHALMIC at 10:00

## 2018-08-09 RX ADMIN — HEPARIN SODIUM 5000 UNITS: 5000 INJECTION, SOLUTION INTRAVENOUS; SUBCUTANEOUS at 21:53

## 2018-08-09 RX ADMIN — VITAMIN D, TAB 1000IU (100/BT) 2000 UNITS: 25 TAB at 09:55

## 2018-08-09 RX ADMIN — HEPARIN SODIUM 5000 UNITS: 5000 INJECTION, SOLUTION INTRAVENOUS; SUBCUTANEOUS at 12:42

## 2018-08-09 RX ADMIN — NYSTATIN: 100000 POWDER TOPICAL at 17:21

## 2018-08-09 RX ADMIN — DORZOLAMIDE HYDROCHLORIDE AND TIMOLOL MALEATE 1 DROP: 20; 5 SOLUTION/ DROPS OPHTHALMIC at 09:58

## 2018-08-09 RX ADMIN — INSULIN LISPRO 5 UNITS: 100 INJECTION, SOLUTION INTRAVENOUS; SUBCUTANEOUS at 09:59

## 2018-08-09 RX ADMIN — CEFTRIAXONE 1000 MG: 1 INJECTION, POWDER, FOR SOLUTION INTRAMUSCULAR; INTRAVENOUS at 17:27

## 2018-08-09 RX ADMIN — DOCUSATE SODIUM 100 MG: 100 CAPSULE, LIQUID FILLED ORAL at 09:54

## 2018-08-09 RX ADMIN — PRAVASTATIN SODIUM 40 MG: 40 TABLET ORAL at 17:27

## 2018-08-09 RX ADMIN — HEPARIN SODIUM 5000 UNITS: 5000 INJECTION, SOLUTION INTRAVENOUS; SUBCUTANEOUS at 05:19

## 2018-08-09 RX ADMIN — INSULIN GLARGINE 15 UNITS: 100 INJECTION, SOLUTION SUBCUTANEOUS at 21:53

## 2018-08-09 RX ADMIN — PREDNISOLONE ACETATE 1 DROP: 10 SUSPENSION/ DROPS OPHTHALMIC at 17:20

## 2018-08-09 RX ADMIN — PANTOPRAZOLE SODIUM 40 MG: 40 INJECTION, POWDER, FOR SOLUTION INTRAVENOUS at 21:53

## 2018-08-09 RX ADMIN — NYSTATIN 1 APPLICATION: 100000 POWDER TOPICAL at 09:59

## 2018-08-09 RX ADMIN — FERROUS GLUCONATE 324 MG: 324 TABLET ORAL at 09:57

## 2018-08-09 RX ADMIN — METOPROLOL TARTRATE 25 MG: 25 TABLET ORAL at 09:56

## 2018-08-09 RX ADMIN — FERROUS GLUCONATE 324 MG: 324 TABLET ORAL at 17:19

## 2018-08-09 RX ADMIN — BIMATOPROST 1 DROP: 0.1 SOLUTION/ DROPS OPHTHALMIC at 21:54

## 2018-08-09 RX ADMIN — DOCUSATE SODIUM 100 MG: 100 CAPSULE, LIQUID FILLED ORAL at 17:27

## 2018-08-09 RX ADMIN — METOPROLOL TARTRATE 25 MG: 25 TABLET ORAL at 21:53

## 2018-08-09 RX ADMIN — PANTOPRAZOLE SODIUM 40 MG: 40 INJECTION, POWDER, FOR SOLUTION INTRAVENOUS at 10:00

## 2018-08-09 RX ADMIN — INSULIN LISPRO 1 UNITS: 100 INJECTION, SOLUTION INTRAVENOUS; SUBCUTANEOUS at 09:59

## 2018-08-09 NOTE — ASSESSMENT & PLAN NOTE
Please refer to GI bleed, patient hemoglobin is currently stable around 8 8, 1 value below 7 was noted few days ago, isolated, likely laboratory mistake  She will be started on p o  iron supplement upon discharge

## 2018-08-09 NOTE — RESTORATIVE TECHNICIAN NOTE
Restorative Specialist Mobility Note       Activity: Bedrest, Dangle     Assistive Device: None     Ambulation Response:  Tolerated fairly well  Repositioned: Supine

## 2018-08-09 NOTE — PROGRESS NOTES
Progress Note - Christel Woodson 1935, 81 y o  female MRN: [id number]    Unit/Bed#: [facility] 509-01 Encounter: [id number]    Primary Care Provider: Srinivas Paz MD   Date and time admitted to hospital: 8/2/2018  3:04 PM        Abnormal CT of the chest   Assessment & Plan    Bilateral multiple pulmonary nodule, suggest to repeat CT scan in 12 months as an outpatient, also liver cyst was noted, stable over time, follow-up as outpatient with primary care physician        Sacral decubitus ulcer   Assessment & Plan    Present on admission, reported by nurse, unstageable, evaluated by wound nurse, continue care, no indication or need for debridement        GI bleed   Assessment & Plan    Patient was admitted for black tarry stool, found to have hemoglobin of 5 2  Saved to blood transfusion, hemoglobin currently stable around 8 8  She was evaluated by gastroenterologist, found to have gastric ulcer, she will be continued on b i d  PPI for now until evaluated by Gastroenterology as an outpatient, hold aspirin until further evaluation by cardiologist as outpatient  CBC in the morning        CKD (chronic kidney disease) stage 3, GFR 30-59 ml/min   Assessment & Plan    Patient creatinine appears to be around her baseline    No concerns at present        Acute blood loss anemia   Assessment & Plan    Please refer to GI bleed, patient hemoglobin is currently stable around 8 8, 1 value below 7 was noted few days ago, isolated, likely laboratory mistake  She will be started on p o  iron supplement upon discharge               Essential hypertension   Assessment & Plan    Continue with beta-blocker and monitoring        Decubitus ulcer of buttock, stage 2   Assessment & Plan    Discussed with good wound care, for superficial, good healing potential, infection noted, appropriate orders placed by wound care nurse        Type 2 diabetes mellitus with diabetic polyneuropathy, without long-term current use of insulin ([facility] [address] )   Assessment & Plan    Lab Results   Component Value Date    HGBA1C 7 6 (H) 08/02/2018       Recent Labs      08/09/18   0630  08/09/18   1039  08/09/18   1507  08/09/18   1632   POCGLU  157*  203*  121  124       Blood Sugar Average: Last 72 hrs:  (P) 724 [id number]     Continue with current insulin regimen and monitor        * Abnormal body temperature   Assessment & Plan    Patient was noted to have abnormal body temperature August 6 night, 100 degree F  Patient is limited historian secondary to possible underlying memory loss/cognitive impairment  Denies cough, chest pain or shortness of breath  Unclear if urinary symptoms at this time  Urinalysis suggestive of urinary tract infection, white count is elevated  Pending urine culture, continue with Rocephin, procalcitonin remains elevated, patient although doing clinically well, WBC reducing, normal temperatures at this time  Chest x-ray suggestive of bilateral atelectasis versus infection, pneumonia less likely, fever might be related also to atelectasis, continue with incentive spirometer and mobilized out of the bed if able  Patient is a very poor historian CT of the chest without contrast obtained, no infiltrates suggesting pneumonia at this time  Urine culture with E coli, pending final sensitivity, likely to change to p o  antibiotics tomorrow preparation for discharge when urine culture available           VTE Pharmacologic Prophylaxis: yes  Pharmacologic: Heparin  Mechanical VTE Prophylaxis in Place: Yes     Patient Centered Rounds: I have performed bedside rounds with nursing staff today      Discussions with Specialists or Other Care Team Provider:  None     Education and Discussions with Family / Rand Salgado, piedad Yarbrough over the phone     Time Spent for Care: 30 minutes   More than 50% of total time spent on counseling and coordination of care as described above      Current Length of Stay: 7 day(s)     Current Patient Status: Inpatient   Certification Statement: monitor hemoglobin, PT/OT     Discharge Plan:   Possibly tomorrow if WBC improves and urine culture available with results      Code Status: Level 1 - Full Code    Subjective:   Patient is feeling better today  She has no complaints  Objective:     Vitals:   Temp (24hrs), Av 6 °F (37 °C), Min:98 3 °F (36 8 °C), Max:98 9 °F (37 2 °C)    HR:  [79-90] 79  Resp:  [18-24] 18  BP: (100-142)/(51-78) 100/53  SpO2:  [92 %-96 %] 92 %  Body mass index is 38 41 kg/m²  Input and Output Summary (last 24 hours): Intake/Output Summary (Last 24 hours) at 18 1703  Last data filed at 18 1100   Gross per 24 hour   Intake              640 ml   Output              350 ml   Net              290 ml       Physical Exam:     Physical Exam   Constitutional: She is oriented to person, place, and time  She appears well-developed  Cardiovascular: Normal rate, regular rhythm and normal heart sounds  Exam reveals no friction rub  No murmur heard  Pulmonary/Chest: Effort normal  No respiratory distress  She has no wheezes  She has no rales  Abdominal: Soft  She exhibits no distension  There is no tenderness  There is no rebound  Musculoskeletal: She exhibits no edema  Neurological: She is alert and oriented to person, place, and time  Skin: Skin is warm  Psychiatric: She has a normal mood and affect         Additional Data:     Labs:      Results from last 7 days  Lab Units 18  0954 18  0519   WBC Thousand/uL  --  11 42*   HEMOGLOBIN g/dL 8 8* 7 9*   HEMATOCRIT % 26 6* 25 3*   PLATELETS Thousands/uL  --  457*   NEUTROS PCT %  --  75   LYMPHS PCT %  --  9*   MONOS PCT %  --  11   EOS PCT %  --  3       Results from last 7 days  Lab Units 18  0519   SODIUM mmol/L 137   POTASSIUM mmol/L 4 0   CHLORIDE mmol/L 104   CO2 mmol/L 25   BUN mg/dL 16   CREATININE mg/dL 1 24   CALCIUM mg/dL 8 4   GLUCOSE RANDOM mg/dL 148*       Results from last 7 days  Lab Units 18  0507   INR 1 13       * I Have Reviewed All Lab Data Listed Above  * Additional Pertinent Lab Tests Reviewed: All Labs Within Last 24 Hours Reviewed    Imaging:    Imaging Reports Reviewed Today Include:  None  Imaging Personally Reviewed by Myself Includes:  None    Recent Cultures (last 7 days):       Results from last 7 days  Lab Units 08/07/18  1417   URINE CULTURE  >100,000 cfu/ml Escherichia coli*       Last 24 Hours Medication List:     Current Facility-Administered Medications:  acetaminophen 650 mg Oral Q6H PRN Pedro Vargas MD    bimatoprost 1 drop Both Eyes HS Kathryn Thomas MD    cefTRIAXone 1,000 mg Intravenous Q24H Pedro Vargas MD Last Rate: 1,000 mg (08/08/18 1725)   cholecalciferol 2,000 Units Oral Daily Kathryn Thomas MD    docusate sodium 100 mg Oral BID Kathryn Thomas MD    dorzolamide-timolol 1 drop Both Eyes BID Kathryn Thomas MD    ferrous gluconate 324 mg Oral BID AC Pedro Vargas MD    heparin (porcine) 5,000 Units Subcutaneous Q8H [address] Mando Murrell MD    insulin glargine 15 Units Subcutaneous HS Mando Murrell MD    insulin lispro 1-6 Units Subcutaneous TID With Meals Lisa Barrera PA-C    insulin lispro 5 Units Subcutaneous TID With Meals Mando Murrell MD    levothyroxine 75 mcg Oral Daily Kathryn Thomas MD    metoprolol tartrate 25 mg Oral Q12H [address] Kathryn Thomas MD    nystatin  Topical BID Kathryn Thomas MD    ondansetron 4 mg Intravenous Q6H PRN Kathryn Thomas MD    pantoprazole 40 mg Intravenous Q12H [address] Jonh Hansen PA-C    polyethylene glycol 17 g Oral Daily Kathryn Thomas MD    pravastatin 40 mg Oral Daily With Miladis Powell MD    prednisoLONE acetate 1 drop Both Eyes BID Kathryn Thomas MD         Today, Patient Was Seen By: Pedro Vargas MD    ** Please Note: Dragon 360 Dictation voice to text software may have been used in the creation of this document   **

## 2018-08-09 NOTE — PHYSICAL THERAPY NOTE
Physical Therapy Progress Note   08/09/18 1100   Pain Assessment   Pain Assessment 0-10   Pain Score 6   Pain Type Chronic pain   Pain Location Knee   Pain Descriptors Discomfort;Aching   Pain Frequency Constant/continuous   Pain Onset Ongoing   Clinical Progression Gradually improving   Effect of Pain on Daily Activities limits mobiltiy   Patient's Stated Pain Goal No pain   Hospital Pain Intervention(s) Medication (See MAR); Repositioned; Ambulation/increased activity; Distraction; Emotional support   Diversional Activities Television   Response to Interventions tolerated ther ex   Restrictions/Precautions   Weight Bearing Precautions Per Order No   Other Precautions Fall Risk;Pain;Hard of hearing   General   Chart Reviewed Yes   Family/Caregiver Present No   Cognition   Overall Cognitive Status WFL   Arousal/Participation Alert; Responsive; Cooperative   Attention Within functional limits   Orientation Level Oriented to person;Oriented to place   Memory Within functional limits   Following Commands Follows one step commands with increased time or repetition   Comments Agreeable to session with encouragement and education   Subjective   Subjective I will try  Bed Mobility   Rolling R 3  Moderate assistance   Additional items Assist x 1; Increased time required;Verbal cues;LE management   Rolling L 2  Maximal assistance   Additional items Assist x 1; Increased time required; Impulsive;LE management   Supine to Sit 2  Maximal assistance   Additional items Assist x 2   Sit to Supine 2  Maximal assistance   Additional items Assist x 2   Transfers   Sit to Stand Unable to assess  (pt refused)   Additional Comments pt refused stand, OOB to chair or amb; tolerated sitting EOB >10 min with min A - S; performed seated ther ex   Ambulation/Elevation   Gait pattern Not tested   Balance   Static Sitting Fair -   Dynamic Sitting Poor +   Endurance Deficit   Endurance Deficit Yes   Endurance Deficit Description pain, generalized fatigue, weakness   Activity Tolerance   Activity Tolerance Patient limited by fatigue;Patient limited by pain   Nurse Made Aware RN consented to therapy session   Exercises   Hamstring Stretch Sitting;5 reps;PROM; Right   Quad Sets Sitting;25 reps;AROM; Bilateral   Glute Sets Sitting;15 reps;AROM   Hip Flexion Sitting;25 reps;AROM; Bilateral   Hip Extension Sitting;15 reps;AROM; Bilateral   Knee Extension Stretch Sitting;5 reps;PROM; Right   Knee AROM Long Arc Quad Sitting;25 reps;AROM; Bilateral   Ankle Pumps Sitting;25 reps;AROM; Bilateral   Assessment   Prognosis Fair   Problem List Decreased strength;Decreased range of motion;Decreased endurance; Impaired balance;Decreased mobility; Impaired judgement;Decreased skin integrity;Pain   Assessment Pt demonstrated ability to progress with overall mobility - tolerated bed mobility, seated EOB activity and ther ex  Encouraged pt to perform AROM in R LE - after ther ex, reported decreased symptoms  Pt deferred any OOB activity  Will continue to progress/assess as able  Goals   Patient Goals None stated   STG Expiration Date 08/13/18   Treatment Day 2   Plan   Treatment/Interventions Functional transfer training; Therapeutic exercise;Elevations;LE strengthening/ROM; Endurance training;Patient/family training;Equipment eval/education; Bed mobility;Gait training;Spoke to nursing   Progress Slow progress, medical status limitations   PT Frequency Other (Comment)  (3-5x)   Recommendation   Recommendation Post acute IP rehab   PT - OK to Discharge Yes   Additional Comments to rehab     Jorene [place], PT

## 2018-08-09 NOTE — ASSESSMENT & PLAN NOTE
Patient was admitted for black tarry stool, found to have hemoglobin of 5 2  Saved to blood transfusion, hemoglobin currently stable around 8 8  She was evaluated by gastroenterologist, found to have gastric ulcer, she will be continued on b i d   PPI for now until evaluated by Gastroenterology as an outpatient, hold aspirin until further evaluation by cardiologist as outpatient  CBC in the morning

## 2018-08-09 NOTE — SOCIAL WORK
Clinical records were faxed to [facility] to request auth for snf admission and BLS transport

## 2018-08-09 NOTE — ASSESSMENT & PLAN NOTE
Lab Results   Component Value Date    HGBA1C 7 6 (H) 08/02/2018       Recent Labs      08/09/18   0630  08/09/18   1039  08/09/18   1507  08/09/18   1632   POCGLU  157*  203*  121  124       Blood Sugar Average: Last 72 hrs:  (P) 917 [id number]     Continue with current insulin regimen and monitor

## 2018-08-09 NOTE — PLAN OF CARE
Problem: PHYSICAL THERAPY ADULT  Goal: Performs mobility at highest level of function for planned discharge setting  See evaluation for individualized goals  Treatment/Interventions: Functional transfer training, LE strengthening/ROM, Therapeutic exercise, Endurance training, Cognitive reorientation, Patient/family training, Bed mobility, Spoke to nursing          See flowsheet documentation for full assessment, interventions and recommendations  Outcome: Progressing  Prognosis: Fair  Problem List: Decreased strength, Decreased range of motion, Decreased endurance, Impaired balance, Decreased mobility, Impaired judgement, Decreased skin integrity, Pain  Assessment: Pt demonstrated ability to progress with overall mobility - tolerated bed mobility, seated EOB activity and ther ex  Encouraged pt to perform AROM in R LE - after ther ex, reported decreased symptoms  Pt deferred any OOB activity  Will continue to progress/assess as able  Barriers to Discharge: None     Recommendation: Post acute IP rehab     PT - OK to Discharge: Yes    See flowsheet documentation for full assessment

## 2018-08-09 NOTE — ASSESSMENT & PLAN NOTE
Patient was noted to have abnormal body temperature August 6 night, 100 degree F  Patient is limited historian secondary to possible underlying memory loss/cognitive impairment  Denies cough, chest pain or shortness of breath  Unclear if urinary symptoms at this time  Urinalysis suggestive of urinary tract infection, white count is elevated  Pending urine culture, continue with Rocephin, procalcitonin remains elevated, patient although doing clinically well, WBC reducing, normal temperatures at this time  Chest x-ray suggestive of bilateral atelectasis versus infection, pneumonia less likely, fever might be related also to atelectasis, continue with incentive spirometer and mobilized out of the bed if able  Patient is a very poor historian CT of the chest without contrast obtained, no infiltrates suggesting pneumonia at this time  Urine culture with E coli, pending final sensitivity, likely to change to p o  antibiotics tomorrow preparation for discharge when urine culture available

## 2018-08-10 VITALS
OXYGEN SATURATION: 96 % | WEIGHT: 208.78 LBS | HEART RATE: 75 BPM | HEIGHT: 62 IN | DIASTOLIC BLOOD PRESSURE: 56 MMHG | SYSTOLIC BLOOD PRESSURE: 121 MMHG | RESPIRATION RATE: 18 BRPM | TEMPERATURE: 98.7 F | BODY MASS INDEX: 38.42 KG/M2

## 2018-08-10 LAB
ANION GAP SERPL CALCULATED.3IONS-SCNC: 6 MMOL/L (ref 4–13)
BACTERIA UR CULT: ABNORMAL
BASOPHILS # BLD AUTO: 0.07 THOUSANDS/ΜL (ref 0–0.1)
BASOPHILS NFR BLD AUTO: 1 % (ref 0–1)
BUN SERPL-MCNC: 15 MG/DL (ref 5–25)
CALCIUM SERPL-MCNC: 8.3 MG/DL (ref 8.3–10.1)
CHLORIDE SERPL-SCNC: 107 MMOL/L (ref 100–108)
CO2 SERPL-SCNC: 27 MMOL/L (ref 21–32)
CREAT SERPL-MCNC: 1.1 MG/DL (ref 0.6–1.3)
EOSINOPHIL # BLD AUTO: 0.47 THOUSAND/ΜL (ref 0–0.61)
EOSINOPHIL NFR BLD AUTO: 6 % (ref 0–6)
ERYTHROCYTE [DISTWIDTH] IN BLOOD BY AUTOMATED COUNT: 14.7 % (ref 11.6–15.1)
GFR SERPL CREATININE-BSD FRML MDRD: 47 ML/MIN/1.73SQ M
GLUCOSE SERPL-MCNC: 101 MG/DL (ref 65–140)
GLUCOSE SERPL-MCNC: 139 MG/DL (ref 65–140)
GLUCOSE SERPL-MCNC: 99 MG/DL (ref 65–140)
HCT VFR BLD AUTO: 26 % (ref 34.8–46.1)
HGB BLD-MCNC: 8.1 G/DL (ref 11.5–15.4)
IMM GRANULOCYTES # BLD AUTO: 0.07 THOUSAND/UL (ref 0–0.2)
IMM GRANULOCYTES NFR BLD AUTO: 1 % (ref 0–2)
LYMPHOCYTES # BLD AUTO: 1.51 THOUSANDS/ΜL (ref 0.6–4.47)
LYMPHOCYTES NFR BLD AUTO: 20 % (ref 14–44)
MAGNESIUM SERPL-MCNC: 1.7 MG/DL (ref 1.6–2.6)
MCH RBC QN AUTO: 28.8 PG (ref 26.8–34.3)
MCHC RBC AUTO-ENTMCNC: 31.2 G/DL (ref 31.4–37.4)
MCV RBC AUTO: 93 FL (ref 82–98)
MONOCYTES # BLD AUTO: 1.05 THOUSAND/ΜL (ref 0.17–1.22)
MONOCYTES NFR BLD AUTO: 14 % (ref 4–12)
NEUTROPHILS # BLD AUTO: 4.43 THOUSANDS/ΜL (ref 1.85–7.62)
NEUTS SEG NFR BLD AUTO: 58 % (ref 43–75)
NRBC BLD AUTO-RTO: 0 /100 WBCS
PHOSPHATE SERPL-MCNC: 3 MG/DL (ref 2.3–4.1)
PLATELET # BLD AUTO: 523 THOUSANDS/UL (ref 149–390)
PMV BLD AUTO: 10 FL (ref 8.9–12.7)
POTASSIUM SERPL-SCNC: 3.9 MMOL/L (ref 3.5–5.3)
RBC # BLD AUTO: 2.81 MILLION/UL (ref 3.81–5.12)
SODIUM SERPL-SCNC: 140 MMOL/L (ref 136–145)
WBC # BLD AUTO: 7.6 THOUSAND/UL (ref 4.31–10.16)

## 2018-08-10 PROCEDURE — 83735 ASSAY OF MAGNESIUM: CPT | Performed by: INTERNAL MEDICINE

## 2018-08-10 PROCEDURE — 99239 HOSP IP/OBS DSCHRG MGMT >30: CPT | Performed by: INTERNAL MEDICINE

## 2018-08-10 PROCEDURE — C9113 INJ PANTOPRAZOLE SODIUM, VIA: HCPCS | Performed by: PHYSICIAN ASSISTANT

## 2018-08-10 PROCEDURE — 85025 COMPLETE CBC W/AUTO DIFF WBC: CPT | Performed by: INTERNAL MEDICINE

## 2018-08-10 PROCEDURE — 82948 REAGENT STRIP/BLOOD GLUCOSE: CPT

## 2018-08-10 PROCEDURE — 80048 BASIC METABOLIC PNL TOTAL CA: CPT | Performed by: INTERNAL MEDICINE

## 2018-08-10 PROCEDURE — 84100 ASSAY OF PHOSPHORUS: CPT | Performed by: INTERNAL MEDICINE

## 2018-08-10 RX ORDER — CEPHALEXIN 500 MG/1
500 CAPSULE ORAL EVERY 12 HOURS SCHEDULED
Status: DISCONTINUED | OUTPATIENT
Start: 2018-08-11 | End: 2018-08-10 | Stop reason: HOSPADM

## 2018-08-10 RX ORDER — CEPHALEXIN 500 MG/1
500 CAPSULE ORAL EVERY 12 HOURS SCHEDULED
Qty: 6 CAPSULE | Refills: 0 | Status: SHIPPED | OUTPATIENT
Start: 2018-08-11 | End: 2018-08-14

## 2018-08-10 RX ORDER — ACETAMINOPHEN 325 MG/1
650 TABLET ORAL EVERY 6 HOURS PRN
Qty: 30 TABLET | Refills: 0 | Status: SHIPPED | OUTPATIENT
Start: 2018-08-10

## 2018-08-10 RX ADMIN — PANTOPRAZOLE SODIUM 40 MG: 40 INJECTION, POWDER, FOR SOLUTION INTRAVENOUS at 08:51

## 2018-08-10 RX ADMIN — FERROUS GLUCONATE 324 MG: 324 TABLET ORAL at 07:31

## 2018-08-10 RX ADMIN — LEVOTHYROXINE SODIUM 75 MCG: 75 TABLET ORAL at 07:29

## 2018-08-10 RX ADMIN — METOPROLOL TARTRATE 25 MG: 25 TABLET ORAL at 08:50

## 2018-08-10 RX ADMIN — DOCUSATE SODIUM 100 MG: 100 CAPSULE, LIQUID FILLED ORAL at 08:50

## 2018-08-10 RX ADMIN — DORZOLAMIDE HYDROCHLORIDE AND TIMOLOL MALEATE 1 DROP: 20; 5 SOLUTION/ DROPS OPHTHALMIC at 08:53

## 2018-08-10 RX ADMIN — INSULIN LISPRO 5 UNITS: 100 INJECTION, SOLUTION INTRAVENOUS; SUBCUTANEOUS at 12:33

## 2018-08-10 RX ADMIN — NYSTATIN: 100000 POWDER TOPICAL at 08:55

## 2018-08-10 RX ADMIN — HEPARIN SODIUM 5000 UNITS: 5000 INJECTION, SOLUTION INTRAVENOUS; SUBCUTANEOUS at 07:29

## 2018-08-10 RX ADMIN — INSULIN LISPRO 5 UNITS: 100 INJECTION, SOLUTION INTRAVENOUS; SUBCUTANEOUS at 07:31

## 2018-08-10 RX ADMIN — VITAMIN D, TAB 1000IU (100/BT) 2000 UNITS: 25 TAB at 08:50

## 2018-08-10 RX ADMIN — PREDNISOLONE ACETATE 1 DROP: 10 SUSPENSION/ DROPS OPHTHALMIC at 08:53

## 2018-08-10 NOTE — ASSESSMENT & PLAN NOTE
Discussed with good wound care, for superficial, good healing potential, infection noted, appropriate orders placed by wound care nurse, to be continued at rehabilitation

## 2018-08-10 NOTE — ASSESSMENT & PLAN NOTE
Lab Results   Component Value Date    HGBA1C 7 6 (H) 08/02/2018       Recent Labs      08/09/18   1632  08/09/18   2106  08/10/18   0705  08/10/18   1111   POCGLU  124  264*  101  139       Blood Sugar Average: Last 72 hrs:  (P) 989 [id number]     She will continue same insulin care upon discharge

## 2018-08-10 NOTE — ASSESSMENT & PLAN NOTE
Please refer to GI bleed, patient hemoglobin is currently stable around 8 1, 1 value below 7 was noted few days ago, isolated, likely laboratory mistake  She will be started on p o  iron supplement upon discharge

## 2018-08-10 NOTE — ASSESSMENT & PLAN NOTE
Patient was admitted for black tarry stool, found to have hemoglobin of 5 2  Saved to blood transfusion, hemoglobin currently stable around 8 1  She was evaluated by gastroenterologist, found to have gastric ulcer, she will be continued on b i d   PPI for now until evaluated by Gastroenterology as an outpatient, hold aspirin until further evaluation by cardiologist as outpatient  CBC suggested on Monday at rehabilitation

## 2018-08-10 NOTE — ASSESSMENT & PLAN NOTE
Patient was noted to have abnormal body temperature August 6 night, 100 degree F  Patient is limited historian secondary to possible underlying memory loss/cognitive impairment  Unclear if urinary symptoms at time of diagnosis  Urinalysis suggestive of urinary tract infection, white count is elevated  Abnormal urinalysis treated with Rocephin IV for a total 4 days  Chest x-ray suggestive of bilateral atelectasis versus infection, pneumonia less likely, fever might be related also to atelectasis, treated with incentive spirometer and ability out of the bed if able  Patient is a very poor historian CT of the chest without contrast obtained, no infiltrates suggesting pneumonia at this time  Urine culture with E coli, sensitive to most common agents, patient received 1 last dose of Rocephin today, starting tomorrow she will start Keflex for another 3 days to complete antibiotic treatment  WBC at this time is normalized

## 2018-08-10 NOTE — DISCHARGE SUMMARY
Discharge summary - Johnie Ruth 1935, 81 y o  female MRN: [id number]    Unit/Bed#: DISCHARGE POOL Encounter: [id number]    Primary Care Provider: Ifeanyi Arora MD   Date and time admitted to hospital: 8/2/2018  3:04 PM        Abnormal CT of the chest   Assessment & Plan    Bilateral multiple pulmonary nodule, suggest to repeat CT scan in 12 months as an outpatient, also liver cyst was noted, stable over time, follow-up as outpatient with primary care physician        Sacral decubitus ulcer   Assessment & Plan    Present on admission, reported by nurse, unstageable, evaluated by wound nurse, continue care, no indication or need for debridement        GI bleed   Assessment & Plan    Patient was admitted for black tarry stool, found to have hemoglobin of 5 2  Saved to blood transfusion, hemoglobin currently stable around 8 1  She was evaluated by gastroenterologist, found to have gastric ulcer, she will be continued on b i d  PPI for now until evaluated by Gastroenterology as an outpatient, hold aspirin until further evaluation by cardiologist as outpatient  CBC suggested on Monday at rehabilitation        CKD (chronic kidney disease) stage 3, GFR 30-59 ml/min   Assessment & Plan    Patient creatinine appears to be around her baseline    No concerns at present        Acute blood loss anemia   Assessment & Plan    Please refer to GI bleed, patient hemoglobin is currently stable around 8 1, 1 value below 7 was noted few days ago, isolated, likely laboratory mistake  She will be started on p o  iron supplement upon discharge               Essential hypertension   Assessment & Plan    Continue with beta-blocker follow-up with PCP        Decubitus ulcer of buttock, stage 2   Assessment & Plan    Discussed with good wound care, for superficial, good healing potential, infection noted, appropriate orders placed by wound care nurse, to be continued at rehabilitation        Type 2 diabetes mellitus with diabetic polyneuropathy, without long-term current use of insulin [facility])   Assessment & Plan    Lab Results   Component Value Date    HGBA1C 7 6 (H) 08/02/2018       Recent Labs      08/09/18   1632  08/09/18   2106  08/10/18   0705  08/10/18   1111   POCGLU  124  264*  101  139       Blood Sugar Average: Last 72 hrs:  (P) 492 [id number]     She will continue same insulin care upon discharge        * Abnormal body temperature   Assessment & Plan    Patient was noted to have abnormal body temperature August 6 night, 100 degree F  Patient is limited historian secondary to possible underlying memory loss/cognitive impairment  Unclear if urinary symptoms at time of diagnosis  Urinalysis suggestive of urinary tract infection, white count is elevated  Abnormal urinalysis treated with Rocephin IV for a total 4 days  Chest x-ray suggestive of bilateral atelectasis versus infection, pneumonia less likely, fever might be related also to atelectasis, treated with incentive spirometer and ability out of the bed if able  Patient is a very poor historian CT of the chest without contrast obtained, no infiltrates suggesting pneumonia at this time  Urine culture with E coli, sensitive to most common agents, patient received 1 last dose of Rocephin today, starting tomorrow she will start Keflex for another 3 days to complete antibiotic treatment  WBC at this time is normalized            Discharging Physician / Practitioner: Arabella Law MD  PCP: Lita Tubbs MD  Admission Date:   Admission Orders     Ordered        08/02/18 2026  Inpatient Admission (expected length of stay for this patient is greater than two midnights)  Once             Discharge Date: 08/10/18    Resolved Problems  Date Reviewed: 8/10/2018    None          Consultations During Hospital Stay:  · GI    Procedures Performed:     · EGD    Significant Findings / Test Results:     · Bleeding ulcer    Incidental Findings:   · None     Test Results Pending at Discharge (will require follow up): · None     Outpatient Tests Requested:  · CBC, BMP    Complications:  None    Reason for Admission:  See H&P    Hospital Course:     Johnie Ruth is a 81 y o  female patient who originally presented to the hospital on 8/2/2018 due to gastrointestinal hemorrhage    Please see above list of diagnoses and related plan for additional information  Condition at Discharge: fair     Discharge Day Visit / Exam:     Subjective:  Patient is feeling well today  No concerns or complaints  Vitals: Blood Pressure: 121/56 (08/09/18 2335)  Pulse: 75 (08/10/18 0823)  Temperature: 98 7 °F (37 1 °C) (08/10/18 0823)  Temp Source: Oral (08/10/18 8084)  Respirations: 18 (08/09/18 2335)  Height: 5' 2" (157 5 cm) (08/03/18 1101)  Weight - Scale: 94 7 kg (208 lb 12 4 oz) (08/10/18 0600)  SpO2: 96 % (08/09/18 2335)  Exam:   Physical Exam   Constitutional: She is oriented to person, place, and time  She appears well-developed  Cardiovascular: Normal rate, regular rhythm and normal heart sounds  Exam reveals no friction rub  No murmur heard  Pulmonary/Chest: Effort normal  No respiratory distress  She has no wheezes  She has no rales  Abdominal: Soft  She exhibits no distension  There is no tenderness  There is no rebound  Musculoskeletal: She exhibits no edema  Neurological: She is alert and oriented to person, place, and time  She exhibits normal muscle tone  Skin: Skin is warm  Psychiatric: She has a normal mood and affect  Discussion with Family:  Son over the phone, in agreement with mother discharge and plan of care    Discharge instructions/Information to patient and family:   See after visit summary for information provided to patient and family  Provisions for Follow-Up Care:  See after visit summary for information related to follow-up care and any pertinent home health orders        Disposition:     Other [place] [facility]    For Discharges to [facility]:   · [address] or fax discharge summary to PCP    Planned Readmission:  No     Discharge Statement:  I spent > 30 minutes discharging the patient  This time was spent on the day of discharge  I had direct contact with the patient on the day of discharge  Greater than 50% of the total time was spent examining patient, answering all patient questions, arranging and discussing plan of care with patient as well as directly providing post-discharge instructions  Additional time then spent on discharge activities  Discharge Medications:  See after visit summary for reconciled discharge medications provided to patient and family        ** Please Note: This note has been constructed using a voice recognition system **

## 2018-08-29 ENCOUNTER — TELEPHONE (OUTPATIENT)
Dept: OBGYN CLINIC | Facility: HOSPITAL | Age: 83
End: 2018-08-29

## 2018-08-29 NOTE — TELEPHONE ENCOUNTER
Sharee Portillo from [facility] is requesting a consult at their facility by one for our doctors for the patient who needs to be seen for osteochondral defect  Emailed Jonathon to advise

## 2018-08-30 RX ORDER — FUROSEMIDE 40 MG/1
40 TABLET ORAL
COMMUNITY
Start: 2018-07-03

## 2018-08-30 RX ORDER — POTASSIUM CHLORIDE 750 MG/1
10 CAPSULE, EXTENDED RELEASE ORAL
COMMUNITY
Start: 2018-07-03 | End: 2020-12-11 | Stop reason: ALTCHOICE

## 2018-08-30 RX ORDER — ASPIRIN 81 MG/1
81 TABLET ORAL
COMMUNITY
End: 2020-08-27 | Stop reason: ALTCHOICE

## 2018-08-30 RX ORDER — QUINAPRIL HCL AND HYDROCHLOROTHIAZIDE 20; 12.5 MG/1; MG/1
TABLET ORAL
COMMUNITY
Start: 2017-12-20 | End: 2020-08-27 | Stop reason: ALTCHOICE

## 2018-08-30 RX ORDER — HYDROCODONE BITARTRATE AND ACETAMINOPHEN 5; 300 MG/1; MG/1
TABLET ORAL
COMMUNITY
Start: 2018-07-03 | End: 2020-08-27 | Stop reason: ALTCHOICE

## 2018-09-04 ENCOUNTER — OFFICE VISIT (OUTPATIENT)
Dept: OBGYN CLINIC | Facility: HOSPITAL | Age: 83
End: 2018-09-04
Payer: COMMERCIAL

## 2018-09-04 VITALS — WEIGHT: 208 LBS | HEIGHT: 62 IN | BODY MASS INDEX: 38.28 KG/M2

## 2018-09-04 DIAGNOSIS — G89.29 CHRONIC PAIN OF RIGHT KNEE: ICD-10-CM

## 2018-09-04 DIAGNOSIS — M25.561 CHRONIC PAIN OF RIGHT KNEE: ICD-10-CM

## 2018-09-04 DIAGNOSIS — M25.462 EFFUSION OF LEFT KNEE: ICD-10-CM

## 2018-09-04 DIAGNOSIS — M17.12 PRIMARY OSTEOARTHRITIS OF LEFT KNEE: Primary | ICD-10-CM

## 2018-09-04 DIAGNOSIS — G89.29 CHRONIC PAIN OF LEFT KNEE: ICD-10-CM

## 2018-09-04 DIAGNOSIS — M17.11 PRIMARY OSTEOARTHRITIS OF RIGHT KNEE: ICD-10-CM

## 2018-09-04 DIAGNOSIS — M25.562 CHRONIC PAIN OF LEFT KNEE: ICD-10-CM

## 2018-09-04 PROCEDURE — 99204 OFFICE O/P NEW MOD 45 MIN: CPT | Performed by: ORTHOPAEDIC SURGERY

## 2018-09-04 PROCEDURE — 20610 DRAIN/INJ JOINT/BURSA W/O US: CPT | Performed by: ORTHOPAEDIC SURGERY

## 2018-09-04 RX ORDER — LIDOCAINE HYDROCHLORIDE 10 MG/ML
2 INJECTION, SOLUTION INFILTRATION; PERINEURAL
Status: COMPLETED | OUTPATIENT
Start: 2018-09-04 | End: 2018-09-04

## 2018-09-04 RX ORDER — BUPIVACAINE HYDROCHLORIDE 2.5 MG/ML
2 INJECTION, SOLUTION INFILTRATION; PERINEURAL
Status: COMPLETED | OUTPATIENT
Start: 2018-09-04 | End: 2018-09-04

## 2018-09-04 RX ORDER — BETAMETHASONE SODIUM PHOSPHATE AND BETAMETHASONE ACETATE 3; 3 MG/ML; MG/ML
12 INJECTION, SUSPENSION INTRA-ARTICULAR; INTRALESIONAL; INTRAMUSCULAR; SOFT TISSUE
Status: COMPLETED | OUTPATIENT
Start: 2018-09-04 | End: 2018-09-04

## 2018-09-04 RX ORDER — GLIMEPIRIDE 1 MG/1
TABLET ORAL
COMMUNITY
Start: 2018-09-04 | End: 2020-08-27 | Stop reason: ALTCHOICE

## 2018-09-04 RX ADMIN — LIDOCAINE HYDROCHLORIDE 2 ML: 10 INJECTION, SOLUTION INFILTRATION; PERINEURAL at 14:14

## 2018-09-04 RX ADMIN — BETAMETHASONE SODIUM PHOSPHATE AND BETAMETHASONE ACETATE 12 MG: 3; 3 INJECTION, SUSPENSION INTRA-ARTICULAR; INTRALESIONAL; INTRAMUSCULAR; SOFT TISSUE at 14:14

## 2018-09-04 RX ADMIN — BUPIVACAINE HYDROCHLORIDE 2 ML: 2.5 INJECTION, SOLUTION INFILTRATION; PERINEURAL at 14:14

## 2018-09-04 NOTE — PROGRESS NOTES
Assessment:  1  Primary osteoarthritis of left knee  Large joint arthrocentesis   2  Chronic pain of left knee  Large joint arthrocentesis   3  Effusion of left knee  Large joint arthrocentesis   4  Primary osteoarthritis of right knee  Large joint arthrocentesis   5  Chronic pain of right knee  Large joint arthrocentesis       Plan: Both knees were injected with cortisone, left knee aspirated prior to the injection  WBAT  Activities as tolerated  ICE injected areas as needed  Follow up PRN     To do next visit:  Return if symptoms worsen or fail to improve  Scribe Attestation    I,:   Liberty Gao am acting as a scribe while in the presence of the attending physician :        I,:   Niki Albert MD personally performed the services described in this documentation    as scribed in my presence :              Subjective:   Kandi Amezcua is a 81 y o  female who presents at request of the medical providers form [facility] which is her place of residence  She presents in a padded wheelchair and with a nurse caretaker [facility]  She states that she doesn't walk all that much and when she does it is with a sturdy walker  She used to work at [employer] on [address]  She has terrible bilateral knee pain and wishes to avoid surgery  Review of systems negative unless otherwise specified in HPI      Past Medical History:   Diagnosis Date    Anemia     Chronic kidney disease     Diabetes mellitus ([address] )     Type 2, blood suar checked in gi admission 257, anesthesia aware    Disease of thyroid gland     GI bleed     History of transfusion     Hyperlipidemia     Hypertension     Kidney stone     Sacral decubitus ulcer        Past Surgical History:   Procedure Laterality Date    CATARACT EXTRACTION       SECTION      CHOLECYSTECTOMY      ESOPHAGOGASTRODUODENOSCOPY N/A 8/3/2018    Procedure: ESOPHAGOGASTRODUODENOSCOPY (EGD);   Surgeon: Tremayne Blank MD;  Location: [facility]; Service: Gastroenterology       History reviewed  No pertinent family history  Social History     Occupational History    Not on file       Social History Main Topics    Smoking status: Never Smoker    Smokeless tobacco: Never Used    Alcohol use No    Drug use: No    Sexual activity: Not on file         Current Outpatient Prescriptions:     furosemide (LASIX) 40 mg tablet, Take 40 mg by mouth, Disp: , Rfl:     HYDROcodone-acetaminophen (XODOL) 5-300 MG per tablet, 1/2 PO BID PRN, Disp: , Rfl:     potassium chloride (MICRO-K) 10 MEQ CR capsule, Take 10 mEq by mouth, Disp: , Rfl:     quinapril-hydrochlorothiazide (ACCURETIC) 20-12 5 MG per tablet, TAKE 1 TABLET BY MOUTH DAILY, Disp: , Rfl:     acetaminophen (TYLENOL) 325 mg tablet, Take 2 tablets (650 mg total) by mouth every 6 (six) hours as needed for mild pain, Disp: 30 tablet, Rfl: 0    aspirin (ECOTRIN LOW STRENGTH) 81 mg EC tablet, Take 81 mg by mouth, Disp: , Rfl:     bimatoprost (LUMIGAN) 0 01 % ophthalmic drops, Administer 1 drop to both eyes daily at bedtime, Disp: , Rfl:     brimonidine-timolol (COMBIGAN) 0 2-0 5 %, Administer 1 drop to the right eye every 12 (twelve) hours, Disp: , Rfl:     Cholecalciferol 1000 units capsule, Take 1 tablet by mouth, Disp: , Rfl:     CHOLECALCIFEROL PO, Take 1 tablet by mouth daily, Disp: , Rfl:     docusate sodium (COLACE) 100 mg capsule, Take 1 capsule (100 mg total) by mouth 2 (two) times a day, Disp: 10 capsule, Rfl: 0    ferrous gluconate (FERGON) 324 mg tablet, Take 1 tablet (324 mg total) by mouth 2 (two) times a day before meals, Disp: 60 tablet, Rfl: 0    glimepiride (AMARYL) 1 mg tablet, , Disp: , Rfl:     insulin glargine (LANTUS) 100 units/mL subcutaneous injection, Inject 15 Units under the skin daily at bedtime, Disp: 10 mL, Rfl: 0    insulin lispro (HumaLOG) 100 units/mL injection, Inject 1-5 Units under the skin 3 (three) times a day before meals, Disp: , Rfl: 0    insulin lispro (HumaLOG) 100 units/mL injection, Inject 5 Units under the skin 3 (three) times a day with meals, Disp: , Rfl: 0    levothyroxine 75 mcg tablet, Take 75 mcg by mouth daily, Disp: , Rfl:     Linagliptin (TRADJENTA) 5 MG TABS, Take 5 mg by mouth daily, Disp: , Rfl:     metoprolol tartrate (LOPRESSOR) 50 mg tablet, Take 25 mg by mouth every 12 (twelve) hours, Disp: , Rfl:     nystatin (MYCOSTATIN) powder, Apply topically 2 (two) times a day, Disp: 15 g, Rfl: 0    pantoprazole (PROTONIX) 40 mg tablet, Take 1 tablet (40 mg total) by mouth 2 (two) times a day, Disp: 60 tablet, Rfl: 0    polyethylene glycol (MIRALAX) 17 g packet, Take 17 g by mouth daily, Disp: 14 each, Rfl: 0    pravastatin (PRAVACHOL) 40 mg tablet, Take 40 mg by mouth daily, Disp: , Rfl:     prednisoLONE acetate (PRED FORTE) 1 % ophthalmic suspension, Administer 1 drop to both eyes 2 (two) times a day, Disp: 5 mL, Rfl: 0    No Known Allergies       There were no vitals filed for this visit  Objective:          Physical Exam                    Right Knee Exam     Tenderness   Right knee tenderness location: diffusely  Range of Motion   Extension: abnormal Right knee extension: with pain  Flexion: abnormal Right knee flexion: with pain  Other   Swelling: mild      Left Knee Exam     Tenderness   Left knee tenderness location: diffusely  Range of Motion   Extension: abnormal Left knee extension: with pain  Flexion: abnormal Left knee flexion: with pain       Other   Swelling: mild  Effusion: effusion (trace) present            Diagnostics, reviewed and taken today if performed as documented:    None performed        Procedures, if performed today:  Large joint arthrocentesis  Date/Time: 9/4/2018 2:14 PM  Consent given by: patient  Site marked: site marked  Supporting Documentation  Indications: pain   Procedure Details  Location: knee - L knee  Preparation: Patient was prepped and draped in the usual sterile fashion  Needle size: 22 G  Ultrasound guidance: no  Medications administered: 2 mL bupivacaine 0 25 %; 2 mL lidocaine 1 %; 12 mg betamethasone acetate-betamethasone sodium phosphate 6 (3-3) mg/mL    Aspirate amount: 5 mL  Patient tolerance: patient tolerated the procedure well with no immediate complications  Dressing:  Sterile dressing applied  Large joint arthrocentesis  Date/Time: 9/4/2018 2:14 PM  Consent given by: patient  Site marked: site marked  Supporting Documentation  Indications: pain   Procedure Details  Location: knee - R knee  Preparation: Patient was prepped and draped in the usual sterile fashion  Needle size: 22 G  Ultrasound guidance: no  Medications administered: 2 mL bupivacaine 0 25 %; 2 mL lidocaine 1 %; 12 mg betamethasone acetate-betamethasone sodium phosphate 6 (3-3) mg/mL    Patient tolerance: patient tolerated the procedure well with no immediate complications  Dressing:  Sterile dressing applied

## 2018-09-04 NOTE — PATIENT INSTRUCTIONS
Steroid Joint Injection   WHAT YOU NEED TO KNOW:   What do I need to know about steroid joint injection? A steroid joint injection is a procedure to inject steroid medicine into a joint  Steroid medicine decreases pain and inflammation  The injection may also contain an anesthetic (numbing medicine) to decrease pain  It may be done to treat conditions such as arthritis, gout, or carpal tunnel syndrome  The injections may be given in your knee, ankle, shoulder, elbow, wrist, or ankle  How do I prepare for steroid joint injection? Your healthcare provider will talk to you about how to prepare for this procedure  He will tell you what medicines to take or not take on the day of your procedure  You may need to stop taking blood thinners several days before your procedure  What will happen during steroid joint injection? You may be given local anesthesia to numb the area where the injection will be given  With local anesthesia, you may still feel pressure during the procedure, but you should not feel any pain  Your healthcare provider may use ultrasound or fluoroscopy (a type of x-ray) to guide the needle to the right area  He will then inject the steroid into your joint  A bandage will be placed on the injection site  What will happen after steroid joint injection? You may have redness, warmth, or sweating in your face and chest right after the steroid injection  Steroids can affect blood sugar levels  If you have diabetes, check your blood sugars closely in the first 24 hours after your procedure  What are the risks of steroid joint injection? You may get an infection in your joint  The injection may also cause more pain during the first 24 to 36 hours  You may need more than one injection to feel pain relief  The skin near the injection site may be damaged and become discolored or indented  This can happen if the steroid is placed too close to your skin   A tendon near the injection site may rupture or a nerve can be damaged  CARE AGREEMENT:   You have the right to help plan your care  Learn about your health condition and how it may be treated  Discuss treatment options with your caregivers to decide what care you want to receive  You always have the right to refuse treatment  The above information is an  only  It is not intended as medical advice for individual conditions or treatments  Talk to your doctor, nurse or pharmacist before following any medical regimen to see if it is safe and effective for you  © 2017 [address]  Information is for End User's use only and may not be sold, redistributed or otherwise used for commercial purposes  All illustrations and images included in CareNotes® are the copyrighted property of [employer]  or [employer] Lovell

## 2019-08-14 NOTE — DISCHARGE SUMMARY
Discharge Summary - [facility] Internal Medicine    Patient Information: Kandi Amezcua 81 y o  female MRN: [id number]  Unit/Bed#: -01 Encounter: [id number]    Discharging Physician / Practitioner: Sobeida West MD  PCP: Andres Lopez MD  Admission Date: 7/20/2018  Discharge Date: 07/27/18    Principal discharge diagnoses:  1  Stage 2 left gluteal decubitus ulcer - presented with bleeding which has resolved  2  1 of 2 blood cultures from admission positive for staphylococcus aureus and anaerococcus - likely contamination  3  BERTHA - resolved    Secondary diagnoses:  1  Anemia from bleeding form decubitus  2  Hypertension  3  Type 2 diabetes  4  CKD-3 with baseline creatinine 1 4 to 1 6      Consultations During Hospital Stay:  General surgery  Infectious disease    Procedures Performed:   1  Renal ultrasound - Atrophic left kidney with renal cortical thinning  Echogenic renal cortices bilaterally consistent with medical renal disease  No hydronephrosis  2  Lower extremity venous doppler - no acute or chronic DVT    Hospital Course:     Kandi Amezcua is a 81 y o  female patient who originally presented to the hospital on 7/20/2018 bleeding from a left decubitus pressure ulcer  She had this gluteal wound for many years but it had not bled in the past  She was evaluated in consultation with surgery and wound care  Local duoderm application dressing was recommended with frequent repositioning  A large clot passed from her wound once followed by mild oozing the next day but she had no further recurrence of bleeding  Her baseline hemoglobin was 11 to 12 and it was stable in the low to mid 9's on discharge  She had acute kidney injury on admission which resolved  She has CKD-3 with a baseline creatinine of 1 4 to 1 6  Her home medication Quinapril/HCTZ was held  1 of 2 admission blood cultures was positive for staphylococcus aureus and anaerococcus and she was evaluated by infectious disease   It was felt to be likely a contaminant  Repeat blood cultures have been negative for 24 hours  She had uncontrolled blood sugars and was placed on Lantus 12 hs and Humalog 5 TID with improvement  Her home medication Dena Dom was held and is being restarted on discharge  Her blood sugars should continue to be monitored and dose of insulin should be increased as needed  Physical therapy recommended rehab and she is being discharged to [facility]  Condition at Discharge: stable     Discharge Day Visit / Exam:     * Please refer to separate progress for these details *    Discharge instructions/Information to patient and family:   See after visit summary for information provided to patient and family  Provisions for Follow-Up Care:  See after visit summary for information related to follow-up care and any pertinent home health orders  Disposition: [facility] physician - left a message    Planned Readmission: No    Discharge Statement:  I spent 40 minutes discharging the patient  This time was spent on the day of discharge  I had direct contact with the patient on the day of discharge  Greater than 50% of the total time was spent examining patient, answering all patient questions, arranging and discussing plan of care with patient as well as directly providing post-discharge instructions  Additional time then spent on discharge activities  Discharge Medications:  See after visit summary for reconciled discharge medications provided to patient and family  ** Please Note: Dragon 360 Dictation voice to text software may have been used in the creation of this document   **

## 2020-08-25 ENCOUNTER — NURSING HOME VISIT (OUTPATIENT)
Dept: FAMILY MEDICINE CLINIC | Facility: CLINIC | Age: 85
End: 2020-08-25
Payer: MEDICARE

## 2020-08-25 DIAGNOSIS — H40.89 OTHER GLAUCOMA OF RIGHT EYE: Primary | ICD-10-CM

## 2020-08-25 DIAGNOSIS — E11.42 TYPE 2 DIABETES MELLITUS WITH DIABETIC POLYNEUROPATHY, WITHOUT LONG-TERM CURRENT USE OF INSULIN (HCC): ICD-10-CM

## 2020-08-25 DIAGNOSIS — I50.89 OTHER HEART FAILURE (HCC): ICD-10-CM

## 2020-08-25 DIAGNOSIS — J96.12 CHRONIC RESPIRATORY FAILURE WITH HYPOXIA AND HYPERCAPNIA (HCC): ICD-10-CM

## 2020-08-25 DIAGNOSIS — J96.11 CHRONIC RESPIRATORY FAILURE WITH HYPOXIA AND HYPERCAPNIA (HCC): ICD-10-CM

## 2020-08-25 DIAGNOSIS — R26.2 AMBULATORY DYSFUNCTION: ICD-10-CM

## 2020-08-25 DIAGNOSIS — E66.01 MORBID OBESITY (HCC): ICD-10-CM

## 2020-08-25 DIAGNOSIS — E03.9 ACQUIRED HYPOTHYROIDISM: ICD-10-CM

## 2020-08-25 DIAGNOSIS — K59.01 SLOW TRANSIT CONSTIPATION: ICD-10-CM

## 2020-08-25 DIAGNOSIS — I10 ESSENTIAL HYPERTENSION: ICD-10-CM

## 2020-08-25 DIAGNOSIS — K21.9 GASTROESOPHAGEAL REFLUX DISEASE WITHOUT ESOPHAGITIS: ICD-10-CM

## 2020-08-25 DIAGNOSIS — N18.30 CKD (CHRONIC KIDNEY DISEASE) STAGE 3, GFR 30-59 ML/MIN (HCC): Chronic | ICD-10-CM

## 2020-08-25 PROCEDURE — 99308 SBSQ NF CARE LOW MDM 20: CPT | Performed by: NURSE PRACTITIONER

## 2020-08-26 NOTE — PROGRESS NOTES
[address] [facility], [place] [facility] [place], [place], [address]      NAME: Naz Brady  AGE: 81 y o  SEX: female [id number]    DATE OF ENCOUNTER: 8/25/2020    Assessment and Plan     Problem List Items Addressed This Visit        Digestive    Slow transit constipation     Continue Colace         Gastroesophageal reflux disease without esophagitis     Continue Omeprazole         Relevant Medications    omeprazole (PriLOSEC) 20 mg delayed release capsule       Endocrine    Type 2 diabetes mellitus with diabetic polyneuropathy, without long-term current use of insulin (HCC)    Acquired hypothyroidism     Continue Levothyroxine            Respiratory    Chronic respiratory failure with hypoxia and hypercapnia (HCC)     Continue Oxygen 3L NC PRN            Cardiovascular and Mediastinum    Essential hypertension     Continue Metoprolol         Other heart failure (HCC)     Continue Lasix            Genitourinary    CKD (chronic kidney disease) stage 3, GFR 30-59 ml/min ([facility]) (Chronic)     Continue to monitor BUN and creatinine            Other    Morbid obesity ([facility])    Ambulatory dysfunction    Other specified glaucoma - Primary     Continue Lumigan, Kirk Browne and Tevin                 Chief Complaint     Chief Complaint   Patient presents with    COPD       History of Present Illness     Patient seen and examined  No noted chest pain, shortness of breath, or palpations  Denies any nausea, vomiting or diarrhea  Patient is on hospice care  She does have some edema to her lower extremities  The following portions of the patient's history were reviewed and updated as appropriate: allergies, current medications, past family history, past medical history, past social history, past surgical history and problem list     Review of Systems     Review of Systems   HENT: Negative  Eyes: Negative  Respiratory: Negative  Negative for cough  Cardiovascular: Positive for leg swelling  Gastrointestinal: Negative  Negative for abdominal distention and abdominal pain  Endocrine: Negative  Genitourinary: Negative  Musculoskeletal: Positive for gait problem  Skin: Negative  Negative for rash and wound  Allergic/Immunologic: Negative  Hematological: Negative  Psychiatric/Behavioral: Negative  Active Problem List     Patient Active Problem List   Diagnosis    Type 2 diabetes mellitus with diabetic polyneuropathy, without long-term current use of insulin ([facility])    Decubitus ulcer of buttock, stage 2 ([facility])    Essential hypertension    Morbid obesity ([address] )    Candidal intertrigo    Ambulatory dysfunction    Acute blood loss anemia    Positive blood culture    CKD (chronic kidney disease) stage 3, GFR 30-59 ml/min ([facility])    GI bleed    Iron deficiency anemia due to chronic blood loss    Abnormal body temperature    Sacral decubitus ulcer    Abnormal CT of the chest    Primary osteoarthritis of left knee    Chronic pain of left knee    Effusion of left knee    Primary osteoarthritis of right knee    Chronic pain of right knee       Objective     There were no vitals taken for this visit  Physical Exam  Vitals signs and nursing note reviewed  Constitutional:       Appearance: Normal appearance  She is obese  HENT:      Head: Normocephalic and atraumatic  Nose: Nose normal       Mouth/Throat:      Mouth: Mucous membranes are moist    Eyes:      Extraocular Movements: Extraocular movements intact  Pupils: Pupils are equal, round, and reactive to light  Neck:      Musculoskeletal: Normal range of motion and neck supple  Cardiovascular:      Rate and Rhythm: Normal rate and regular rhythm  Pulses: Normal pulses  Dorsalis pedis pulses are 2+ on the left side  Posterior tibial pulses are 2+ on the right side  Heart sounds: Normal heart sounds  No friction rub  No gallop      Pulmonary:      Effort: Pulmonary effort is normal       Breath sounds: Normal breath sounds  Abdominal:      General: Bowel sounds are normal       Palpations: Abdomen is soft  Musculoskeletal:      Right lower le+ Pitting Edema present  Left lower le+ Pitting Edema present  Comments: Generalized weakness     Skin:     General: Skin is warm and dry  Capillary Refill: Capillary refill takes less than 2 seconds  Coloration: Skin is not pale  Findings: No lesion or rash  Neurological:      Mental Status: She is alert and oriented to person, place, and time  Mental status is at baseline     Psychiatric:         Mood and Affect: Mood normal          Behavior: Behavior normal          Pertinent Laboratory/Diagnostic Studies:  No labs on hospice    Current Medications     Current Outpatient Medications:     acetaminophen (TYLENOL) 325 mg tablet, Take 2 tablets (650 mg total) by mouth every 6 (six) hours as needed for mild pain, Disp: 30 tablet, Rfl: 0    bimatoprost (LUMIGAN) 0 01 % ophthalmic drops, Administer 1 drop to both eyes daily at bedtime, Disp: , Rfl:     brimonidine-timolol (COMBIGAN) 0 2-0 5 %, Administer 1 drop to the right eye every 12 (twelve) hours, Disp: , Rfl:     docusate sodium (COLACE) 100 mg capsule, Take 1 capsule (100 mg total) by mouth 2 (two) times a day, Disp: 10 capsule, Rfl: 0    furosemide (LASIX) 40 mg tablet, Take 40 mg by mouth, Disp: , Rfl:     levothyroxine 75 mcg tablet, Take 100 mcg by mouth daily, Disp: , Rfl:     metoprolol tartrate (LOPRESSOR) 50 mg tablet, Take 25 mg by mouth every 12 (twelve) hours, Disp: , Rfl:     omeprazole (PriLOSEC) 20 mg delayed release capsule, Take 20 mg by mouth daily, Disp: , Rfl:     prednisoLONE acetate (PRED FORTE) 1 % ophthalmic suspension, Administer 1 drop to both eyes 2 (two) times a day, Disp: 5 mL, Rfl: 0    potassium chloride (MICRO-K) 10 MEQ CR capsule, Take 10 mEq by mouth, Disp: , Rfl:

## 2020-08-27 VITALS
HEART RATE: 62 BPM | WEIGHT: 210 LBS | DIASTOLIC BLOOD PRESSURE: 69 MMHG | SYSTOLIC BLOOD PRESSURE: 128 MMHG | RESPIRATION RATE: 20 BRPM | OXYGEN SATURATION: 96 % | BODY MASS INDEX: 38.41 KG/M2 | TEMPERATURE: 97.2 F

## 2020-08-27 PROBLEM — R93.89 ABNORMAL CT OF THE CHEST: Status: RESOLVED | Noted: 2018-08-08 | Resolved: 2020-08-27

## 2020-08-27 PROBLEM — M25.561 CHRONIC PAIN OF RIGHT KNEE: Status: RESOLVED | Noted: 2018-09-04 | Resolved: 2020-08-27

## 2020-08-27 PROBLEM — K92.2 GI BLEED: Status: RESOLVED | Noted: 2018-08-02 | Resolved: 2020-08-27

## 2020-08-27 PROBLEM — K59.01 SLOW TRANSIT CONSTIPATION: Status: ACTIVE | Noted: 2020-08-27

## 2020-08-27 PROBLEM — G89.29 CHRONIC PAIN OF RIGHT KNEE: Status: RESOLVED | Noted: 2018-09-04 | Resolved: 2020-08-27

## 2020-08-27 PROBLEM — D50.0 IRON DEFICIENCY ANEMIA DUE TO CHRONIC BLOOD LOSS: Status: RESOLVED | Noted: 2018-08-02 | Resolved: 2020-08-27

## 2020-08-27 PROBLEM — K21.9 GASTROESOPHAGEAL REFLUX DISEASE WITHOUT ESOPHAGITIS: Status: ACTIVE | Noted: 2020-08-27

## 2020-08-27 PROBLEM — E03.9 ACQUIRED HYPOTHYROIDISM: Status: ACTIVE | Noted: 2020-08-27

## 2020-08-27 PROBLEM — L89.302 DECUBITUS ULCER OF BUTTOCK, STAGE 2 (HCC): Status: RESOLVED | Noted: 2018-07-20 | Resolved: 2020-08-27

## 2020-08-27 PROBLEM — L89.159 SACRAL DECUBITUS ULCER: Status: RESOLVED | Noted: 2018-08-07 | Resolved: 2020-08-27

## 2020-08-27 PROBLEM — R68.89 ABNORMAL BODY TEMPERATURE: Status: RESOLVED | Noted: 2018-08-07 | Resolved: 2020-08-27

## 2020-08-27 PROBLEM — R78.81 POSITIVE BLOOD CULTURE: Status: RESOLVED | Noted: 2018-07-22 | Resolved: 2020-08-27

## 2020-08-27 PROBLEM — D62 ACUTE BLOOD LOSS ANEMIA: Status: RESOLVED | Noted: 2018-07-21 | Resolved: 2020-08-27

## 2020-08-27 PROBLEM — H40.89 OTHER SPECIFIED GLAUCOMA: Status: ACTIVE | Noted: 2020-08-27

## 2020-08-27 PROBLEM — M17.12 PRIMARY OSTEOARTHRITIS OF LEFT KNEE: Status: RESOLVED | Noted: 2018-09-04 | Resolved: 2020-08-27

## 2020-08-27 PROBLEM — J96.12 CHRONIC RESPIRATORY FAILURE WITH HYPOXIA AND HYPERCAPNIA (HCC): Status: ACTIVE | Noted: 2020-08-27

## 2020-08-27 PROBLEM — B37.2 CANDIDAL INTERTRIGO: Status: RESOLVED | Noted: 2018-07-20 | Resolved: 2020-08-27

## 2020-08-27 PROBLEM — M25.562 CHRONIC PAIN OF LEFT KNEE: Status: RESOLVED | Noted: 2018-09-04 | Resolved: 2020-08-27

## 2020-08-27 PROBLEM — M25.462 EFFUSION OF LEFT KNEE: Status: RESOLVED | Noted: 2018-09-04 | Resolved: 2020-08-27

## 2020-08-27 PROBLEM — G89.29 CHRONIC PAIN OF LEFT KNEE: Status: RESOLVED | Noted: 2018-09-04 | Resolved: 2020-08-27

## 2020-08-27 PROBLEM — M17.11 PRIMARY OSTEOARTHRITIS OF RIGHT KNEE: Status: RESOLVED | Noted: 2018-09-04 | Resolved: 2020-08-27

## 2020-08-27 PROBLEM — I50.89 OTHER HEART FAILURE (HCC): Status: ACTIVE | Noted: 2020-08-27

## 2020-08-27 PROBLEM — J96.11 CHRONIC RESPIRATORY FAILURE WITH HYPOXIA AND HYPERCAPNIA (HCC): Status: ACTIVE | Noted: 2020-08-27

## 2020-08-27 RX ORDER — OMEPRAZOLE 20 MG/1
20 CAPSULE, DELAYED RELEASE ORAL DAILY
COMMUNITY
End: 2020-09-27 | Stop reason: ALTCHOICE

## 2020-09-24 ENCOUNTER — NURSING HOME VISIT (OUTPATIENT)
Dept: FAMILY MEDICINE CLINIC | Facility: CLINIC | Age: 85
End: 2020-09-24
Payer: COMMERCIAL

## 2020-09-24 DIAGNOSIS — I10 ESSENTIAL HYPERTENSION: ICD-10-CM

## 2020-09-24 DIAGNOSIS — J96.11 CHRONIC RESPIRATORY FAILURE WITH HYPOXIA AND HYPERCAPNIA (HCC): ICD-10-CM

## 2020-09-24 DIAGNOSIS — K59.01 SLOW TRANSIT CONSTIPATION: ICD-10-CM

## 2020-09-24 DIAGNOSIS — J96.12 CHRONIC RESPIRATORY FAILURE WITH HYPOXIA AND HYPERCAPNIA (HCC): ICD-10-CM

## 2020-09-24 DIAGNOSIS — E03.9 ACQUIRED HYPOTHYROIDISM: ICD-10-CM

## 2020-09-24 DIAGNOSIS — I50.89 OTHER HEART FAILURE (HCC): ICD-10-CM

## 2020-09-24 DIAGNOSIS — K21.9 GASTROESOPHAGEAL REFLUX DISEASE WITHOUT ESOPHAGITIS: Primary | ICD-10-CM

## 2020-09-24 PROCEDURE — 99309 SBSQ NF CARE MODERATE MDM 30: CPT | Performed by: NURSE PRACTITIONER

## 2020-09-26 NOTE — PROGRESS NOTES
Nursing Home Visit      NAME: Rory Medina  AGE: 81 y o  SEX: female [phone]    DATE OF ENCOUNTER 9/24/2020    Assessment and Plan     Problem List Items Addressed This Visit        Digestive    Slow transit constipation     Continue Colace         Gastroesophageal reflux disease without esophagitis - Primary     Omeprazole was discontinued  Patient denies and complaints heartburn  She is currently in hospice            Endocrine    Acquired hypothyroidism     Continue Levothyroxine            Respiratory    Chronic respiratory failure with hypoxia and hypercapnia ([facility])     Continue oxygen 3L per nasal cannula  Patient is currently on hospice            Cardiovascular and Mediastinum    Essential hypertension     Continue metoprolol         Other heart failure ([address] )     Continue Lasix                 Chief Complaint     Chief Complaint   Patient presents with    Respiratory failure       History of Present Illness     Patient seen and examined  She states she is very tired  No complain of nausea, vomiting or diarrhea  She remains on continuous oxygen  States she cut short of breath easily with exertion  The following portions of the patient's history were reviewed and updated as appropriate: allergies, current medications, past family history, past medical history, past social history, past surgical history and problem list     Review of Systems     Review of Systems   Constitutional: Positive for activity change and fatigue  Negative for appetite change, chills, diaphoresis, fever and unexpected weight change  HENT: Negative  Negative for congestion, dental problem, ear discharge, ear pain, facial swelling, mouth sores, postnasal drip, rhinorrhea, sinus pain and sore throat  Eyes: Negative  Negative for pain, discharge, redness, itching and visual disturbance  Respiratory: Positive for shortness of breath  Negative for cough and wheezing  Cardiovascular: Negative    Negative for chest pain, palpitations and leg swelling  Gastrointestinal: Negative  Negative for abdominal distention, constipation, diarrhea, nausea and vomiting  Endocrine: Negative  Negative for cold intolerance, heat intolerance, polydipsia, polyphagia and polyuria  Genitourinary: Negative  Negative for difficulty urinating, frequency, hematuria and urgency  Musculoskeletal: Negative  Negative for arthralgias, back pain and myalgias  Skin: Negative  Negative for pallor, rash and wound  Allergic/Immunologic: Negative  Negative for immunocompromised state  Neurological: Negative for tremors, seizures, speech difficulty, weakness, light-headedness, numbness and headaches  Hematological: Negative  Does not bruise/bleed easily  Psychiatric/Behavioral: Negative  Negative for agitation, behavioral problems, hallucinations, sleep disturbance and suicidal ideas  Active Problem List     Patient Active Problem List   Diagnosis    Type 2 diabetes mellitus with diabetic polyneuropathy, without long-term current use of insulin ([facility])    Essential hypertension    Morbid [facility]    Ambulatory dysfunction    CKD (chronic kidney disease) stage 3, GFR 30-59 ml/min ([facility])    Other specified glaucoma    Acquired hypothyroidism    Slow transit constipation    Gastroesophageal reflux disease without esophagitis    Other heart failure ([facility])    Chronic respiratory failure with hypoxia and hypercapnia ([facility])       Objective     /50   Pulse 55   Temp (!) 97 2 °F (36 2 °C)   Resp 20   Wt 95 3 kg (210 lb)   SpO2 97%   BMI 38 41 kg/m²     Physical Exam  Vitals signs and nursing note reviewed  Constitutional:       General: She is not in acute distress  Appearance: Normal appearance  She is well-developed  She is not ill-appearing or toxic-appearing  HENT:      Head: Normocephalic and atraumatic        Right Ear: External ear normal       Left Ear: External ear normal       Nose: Nose normal  No congestion or rhinorrhea  Mouth/Throat:      Mouth: Mucous membranes are moist       Pharynx: Oropharynx is clear  No posterior oropharyngeal erythema  Eyes:      Extraocular Movements: Extraocular movements intact  Conjunctiva/sclera: Conjunctivae normal       Pupils: Pupils are equal, round, and reactive to light  Neck:      Musculoskeletal: Normal range of motion and neck supple  No neck rigidity or muscular tenderness  Cardiovascular:      Rate and Rhythm: Normal rate and regular rhythm  Pulses: Normal pulses  Dorsalis pedis pulses are 2+ on the right side and 2+ on the left side  Posterior tibial pulses are 2+ on the right side and 2+ on the left side  Heart sounds: Normal heart sounds  No murmur  No friction rub  No gallop  Pulmonary:      Effort: Pulmonary effort is normal       Breath sounds: No stridor  Wheezing present  No rhonchi or rales  Comments: Short of breath with exertion  Intermittent wheezes  Chest:      Chest wall: No tenderness  Abdominal:      General: Bowel sounds are normal  There is no distension  Palpations: Abdomen is soft  Tenderness: There is no abdominal tenderness  Musculoskeletal: Normal range of motion  General: No swelling, tenderness or deformity  Comments: Generalized weakness   Skin:     General: Skin is warm and dry  Capillary Refill: Capillary refill takes less than 2 seconds  Coloration: Skin is not jaundiced or pale  Findings: No erythema, lesion or rash  Neurological:      General: No focal deficit present  Mental Status: She is alert and oriented to person, place, and time  Mental status is at baseline  Motor: Weakness present        Gait: Gait abnormal    Psychiatric:         Mood and Affect: Mood normal          Behavior: Behavior normal          Pertinent Laboratory/Diagnostic Studies:    No labs    Current Medications     Current Outpatient Medications:    acetaminophen (TYLENOL) 325 mg tablet, Take 2 tablets (650 mg total) by mouth every 6 (six) hours as needed for mild pain, Disp: 30 tablet, Rfl: 0    bimatoprost (LUMIGAN) 0 01 % ophthalmic drops, Administer 1 drop to both eyes daily at bedtime, Disp: , Rfl:     brimonidine-timolol (COMBIGAN) 0 2-0 5 %, Administer 1 drop to the right eye every 12 (twelve) hours, Disp: , Rfl:     docusate sodium (COLACE) 100 mg capsule, Take 1 capsule (100 mg total) by mouth 2 (two) times a day, Disp: 10 capsule, Rfl: 0    furosemide (LASIX) 40 mg tablet, Take 40 mg by mouth, Disp: , Rfl:     levothyroxine 75 mcg tablet, Take 100 mcg by mouth daily, Disp: , Rfl:     metoprolol tartrate (LOPRESSOR) 50 mg tablet, Take 25 mg by mouth every 12 (twelve) hours, Disp: , Rfl:     omeprazole (PriLOSEC) 20 mg delayed release capsule, Take 20 mg by mouth daily, Disp: , Rfl:     potassium chloride (MICRO-K) 10 MEQ CR capsule, Take 10 mEq by mouth, Disp: , Rfl:     prednisoLONE acetate (PRED FORTE) 1 % ophthalmic suspension, Administer 1 drop to both eyes 2 (two) times a day, Disp: 5 mL, Rfl: 0

## 2020-09-27 VITALS
RESPIRATION RATE: 20 BRPM | SYSTOLIC BLOOD PRESSURE: 124 MMHG | TEMPERATURE: 97.2 F | DIASTOLIC BLOOD PRESSURE: 50 MMHG | HEART RATE: 55 BPM | WEIGHT: 210 LBS | OXYGEN SATURATION: 97 % | BODY MASS INDEX: 38.41 KG/M2

## 2020-10-14 ENCOUNTER — NURSING HOME VISIT (OUTPATIENT)
Dept: INTERNAL MEDICINE CLINIC | Facility: CLINIC | Age: 85
End: 2020-10-14
Payer: MEDICARE

## 2020-10-14 DIAGNOSIS — I50.89 OTHER HEART FAILURE (HCC): ICD-10-CM

## 2020-10-14 DIAGNOSIS — J96.11 CHRONIC RESPIRATORY FAILURE WITH HYPOXIA AND HYPERCAPNIA (HCC): Primary | ICD-10-CM

## 2020-10-14 DIAGNOSIS — E03.9 ACQUIRED HYPOTHYROIDISM: ICD-10-CM

## 2020-10-14 DIAGNOSIS — I10 ESSENTIAL HYPERTENSION: ICD-10-CM

## 2020-10-14 DIAGNOSIS — J96.12 CHRONIC RESPIRATORY FAILURE WITH HYPOXIA AND HYPERCAPNIA (HCC): Primary | ICD-10-CM

## 2020-10-14 PROCEDURE — 99309 SBSQ NF CARE MODERATE MDM 30: CPT | Performed by: INTERNAL MEDICINE

## 2020-12-11 ENCOUNTER — NURSING HOME VISIT (OUTPATIENT)
Dept: GERIATRICS | Facility: OTHER | Age: 85
End: 2020-12-11
Payer: MEDICARE

## 2020-12-11 DIAGNOSIS — I50.89 OTHER HEART FAILURE (HCC): ICD-10-CM

## 2020-12-11 DIAGNOSIS — E66.01 MORBID OBESITY (HCC): ICD-10-CM

## 2020-12-11 DIAGNOSIS — J96.11 CHRONIC RESPIRATORY FAILURE WITH HYPOXIA AND HYPERCAPNIA (HCC): Primary | ICD-10-CM

## 2020-12-11 DIAGNOSIS — E03.9 ACQUIRED HYPOTHYROIDISM: ICD-10-CM

## 2020-12-11 DIAGNOSIS — I10 ESSENTIAL HYPERTENSION: ICD-10-CM

## 2020-12-11 DIAGNOSIS — J96.12 CHRONIC RESPIRATORY FAILURE WITH HYPOXIA AND HYPERCAPNIA (HCC): Primary | ICD-10-CM

## 2020-12-11 PROBLEM — R53.81 DEBILITY: Status: ACTIVE | Noted: 2020-12-11

## 2020-12-11 PROBLEM — Z51.5 HOSPICE CARE PATIENT: Status: ACTIVE | Noted: 2020-12-11

## 2020-12-11 PROCEDURE — 99309 SBSQ NF CARE MODERATE MDM 30: CPT | Performed by: NURSE PRACTITIONER

## 2020-12-24 ENCOUNTER — NURSING HOME VISIT (OUTPATIENT)
Dept: FAMILY MEDICINE CLINIC | Facility: CLINIC | Age: 85
End: 2020-12-24
Payer: MEDICARE

## 2020-12-24 DIAGNOSIS — I10 ESSENTIAL HYPERTENSION: ICD-10-CM

## 2020-12-24 DIAGNOSIS — J96.12 CHRONIC RESPIRATORY FAILURE WITH HYPOXIA AND HYPERCAPNIA (HCC): Primary | ICD-10-CM

## 2020-12-24 DIAGNOSIS — N18.32 STAGE 3B CHRONIC KIDNEY DISEASE (HCC): Chronic | ICD-10-CM

## 2020-12-24 DIAGNOSIS — I50.89 OTHER HEART FAILURE (HCC): ICD-10-CM

## 2020-12-24 DIAGNOSIS — E03.9 ACQUIRED HYPOTHYROIDISM: ICD-10-CM

## 2020-12-24 DIAGNOSIS — J96.11 CHRONIC RESPIRATORY FAILURE WITH HYPOXIA AND HYPERCAPNIA (HCC): Primary | ICD-10-CM

## 2020-12-24 PROCEDURE — 99308 SBSQ NF CARE LOW MDM 20: CPT | Performed by: INTERNAL MEDICINE

## 2020-12-26 VITALS — HEART RATE: 79 BPM | SYSTOLIC BLOOD PRESSURE: 132 MMHG | DIASTOLIC BLOOD PRESSURE: 80 MMHG | RESPIRATION RATE: 18 BRPM

## 2021-02-16 ENCOUNTER — NURSING HOME VISIT (OUTPATIENT)
Dept: GERIATRICS | Facility: OTHER | Age: 86
End: 2021-02-16
Payer: MEDICARE

## 2021-02-16 DIAGNOSIS — Z51.5 HOSPICE CARE PATIENT: ICD-10-CM

## 2021-02-16 DIAGNOSIS — J96.11 CHRONIC RESPIRATORY FAILURE WITH HYPOXIA AND HYPERCAPNIA (HCC): Primary | ICD-10-CM

## 2021-02-16 DIAGNOSIS — I50.89 OTHER HEART FAILURE (HCC): ICD-10-CM

## 2021-02-16 DIAGNOSIS — J96.12 CHRONIC RESPIRATORY FAILURE WITH HYPOXIA AND HYPERCAPNIA (HCC): Primary | ICD-10-CM

## 2021-02-16 DIAGNOSIS — I10 ESSENTIAL HYPERTENSION: ICD-10-CM

## 2021-02-16 DIAGNOSIS — E03.9 ACQUIRED HYPOTHYROIDISM: ICD-10-CM

## 2021-02-16 PROCEDURE — 99309 SBSQ NF CARE MODERATE MDM 30: CPT | Performed by: NURSE PRACTITIONER

## 2021-02-16 NOTE — ASSESSMENT & PLAN NOTE
- with chronic lymphedema bilateral lower extremities  - continue Lasix  - no further blood work as patient is under hospice care

## 2021-02-16 NOTE — PROGRESS NOTES
[facility] patient  POS: 28 (LTC)  Progress Note    Chief Complaint/Reason for visit: LTC follow up of acute and chronic medical conditions  History of Present Illness:  81-year-old female seen and examined for LTC follow up  Patient is under hospice care with medical history of chronic respiratory failure with hypoxia and hypercapnia, essential hypertension, CHF, acquired hypothyroidism, abnormal CT of the chest with bilateral multiple pulmonary nodule  Patient is resting in bed and appears in no distress  Denies pain or discomfort  No acute concerns reported by nursing  Past Medical History: unchanged from history and physical  Past Medical History:   Diagnosis Date    Acquired hypothyroidism 8/27/2020    Adult failure to thrive     Anemia     Chronic kidney disease     Chronic respiratory failure with hypoxia and hypercapnia (HCC) 8/27/2020    Diabetes [facility] )     Diabetes mellitus ([facility] )     Type 2, blood suar checked in gi admission 257, anesthesia aware    Disease of thyroid gland     Dysphagia     Gastroesophageal reflux disease without esophagitis 8/27/2020    GI bleed     Heart failure ([address] )     History of transfusion     Hyperlipidemia     Hypertension     Hypothyroidism, unspecified     Kidney stone     Other heart failure ([address] ) 8/27/2020    Other specified glaucoma 8/27/2020    PVD (peripheral vascular disease) ([address] )     Respiratory failure, unspecified, unspecified whether with hypoxia or hypercapnia ([address] )     Sacral decubitus ulcer     Slow transit constipation 8/27/2020     Family History: unchanged from history and physical  Social History: unchanged from history and physical  Resident Since: 9/29/2018  Review of systems: Review of Systems   Constitutional: Negative for appetite change, chills and diaphoresis  Fatigues easily  HENT: Negative  Eyes: Negative  Respiratory: Positive for shortness of breath  Negative for cough  Shortness of breath on exertion   Cardiovascular: Negative for chest pain and leg swelling  Gastrointestinal: Negative for abdominal pain  Musculoskeletal: Positive for gait problem  Psychiatric/Behavioral: Negative for agitation and hallucinations  The patient is not nervous/anxious  All other systems reviewed and are negative  Medications: All medication orders were reviewed and updated  Allergies: NKDA  Consults reviewed: Other  Labs/Diagnostics (reviewed by this provider):     Imaging Reviewed: None today  Physical Exam    Weight:  217 4 lb Temp:   97 5              BP:  119/54  Pulse:  52    Resp: 20 O2 Sat: 97% on oxygen via nasal cannula  Constitutional: Obese and Normocephalic  Orientation:Person and Place     Physical Exam  Vitals signs and nursing note reviewed  Constitutional:       General: She is not in acute distress  Appearance: She is obese  She is not toxic-appearing or diaphoretic  Comments: Elderly female who appears with chronic illness  HENT:      Head: Normocephalic and atraumatic  Nose: No congestion  Mouth/Throat:      Mouth: Mucous membranes are moist       Pharynx: No oropharyngeal exudate  Eyes:      Extraocular Movements: Extraocular movements intact  Conjunctiva/sclera: Conjunctivae normal       Pupils: Pupils are equal, round, and reactive to light  Neck:      Musculoskeletal: Neck supple  Cardiovascular:      Rate and Rhythm: Normal rate and regular rhythm  Comments: Lymphedema noted bilateral lower extremities  Pulmonary:      Effort: Pulmonary effort is normal  No respiratory distress  Breath sounds: Normal breath sounds  No wheezing or rales  Comments: Decreased breath sounds bilateral lung fields  Oxygen on via nasal cannula  Abdominal:      General: Bowel sounds are normal       Comments: Obese abdomen  Musculoskeletal:      Right lower leg: Edema present  Left lower leg: Edema present     Neurological: Mental Status: She is alert  Mental status is at baseline  Motor: Weakness present  Gait: Gait abnormal    Psychiatric:         Mood and Affect: Mood normal          Behavior: Behavior normal          Thought Content: Thought content normal        Assessment/Plan:  50-year-old female with:    Chronic respiratory failure with hypoxia and hypercapnia (HCC)  - requires continuous oxygen via nasal cannula  No respiratory distress noted  - patient is under hospice care    Essential hypertension  - SBPs trending 120s-130s  - continue metoprolol, Lasix    Acquired hypothyroidism  - continue Synthroid  No further blood work    Other heart failure ([address] )  - with chronic lymphedema bilateral lower extremities  - continue Lasix  - no further blood work as patient is under hospice care    Hospice care patient  - continue hospice care under [address]  - continue supportive care at [facility] for ADLs      **Please note: This progress note was constructed using a voice recognition system  [address] [id number] [facility]  [id number]/33/[id number] AM

## 2021-02-16 NOTE — ASSESSMENT & PLAN NOTE
- requires continuous oxygen via nasal cannula   No respiratory distress noted  - patient is under hospice care

## 2021-02-16 NOTE — ASSESSMENT & PLAN NOTE
- continue hospice care under [facility] hospice  - continue supportive care at [facility] for ADLs

## 2021-05-14 ENCOUNTER — NURSING HOME VISIT (OUTPATIENT)
Dept: FAMILY MEDICINE CLINIC | Facility: CLINIC | Age: 86
End: 2021-05-14
Payer: MEDICARE

## 2021-05-14 DIAGNOSIS — N18.32 STAGE 3B CHRONIC KIDNEY DISEASE (HCC): Chronic | ICD-10-CM

## 2021-05-14 DIAGNOSIS — K59.01 SLOW TRANSIT CONSTIPATION: ICD-10-CM

## 2021-05-14 DIAGNOSIS — E03.9 ACQUIRED HYPOTHYROIDISM: Primary | ICD-10-CM

## 2021-05-14 PROCEDURE — 99307 SBSQ NF CARE SF MDM 10: CPT | Performed by: INTERNAL MEDICINE

## 2021-05-16 NOTE — PROGRESS NOTES
Assessment/Plan:         Problem List Items Addressed This Visit        Digestive    Slow transit constipation       Under control with current medications  Monitor for any pain or discomfort            Endocrine    Acquired hypothyroidism - Primary       Genitourinary    CKD (chronic kidney disease) stage 3, GFR 30-59 ml/min (HCC) (Chronic)     Lab Results   Component Value Date    EGFR 47 08/10/2018    EGFR 41 2018    EGFR 51 2018    CREATININE 1 10 08/10/2018    CREATININE 1 24 2018    CREATININE 1 03 2018     Patient is under hospice care  No more blood work being done  Subjective:      Patient ID: Kieran Jackson is a 81 y o  female  HPI    The following portions of the patient's history were reviewed and updated as appropriate:   Past Medical History:  She has a past medical history of Acquired hypothyroidism (2020), Adult failure to thrive, Anemia, Chronic kidney disease, Chronic respiratory failure with hypoxia and hypercapnia ([facility] [address] ) (2020), Diabetes ([facility] [address] ), Diabetes mellitus ([facility] [address] ), Disease of thyroid gland, Dysphagia, Gastroesophageal reflux disease without esophagitis (2020), GI bleed, Heart failure ([facility] [address] ), History of transfusion, Hyperlipidemia, Hypertension, Hypothyroidism, unspecified, Kidney stone, Other heart failure ([facility] [address] ) (2020), Other specified glaucoma (2020), PVD (peripheral vascular disease) ([facility] [address] ), Respiratory failure, unspecified, unspecified whether with hypoxia or hypercapnia ([facility] [address] ), Sacral decubitus ulcer, and Slow transit constipation (2020)  ,  _______________________________________________________________________  Medical Problems:  does not have any pertinent problems on file ,  _______________________________________________________________________  Past Surgical History:   has a past surgical history that includes Cataract extraction; Cholecystectomy;   section; Esophagogastroduodenoscopy (N/A, 8/3/2018); and IR PICC placement single lumen (3/14/2019)  ,  _______________________________________________________________________  Family History:  family history is not on file ,  _______________________________________________________________________  Social History:   reports that she has never smoked  She has never used smokeless tobacco  She reports that she does not drink alcohol or use drugs  ,  _______________________________________________________________________  Allergies:  has No Known Allergies     _______________________________________________________________________  Current Outpatient Medications   Medication Sig Dispense Refill    acetaminophen (TYLENOL) 325 mg tablet Take 2 tablets (650 mg total) by mouth every 6 (six) hours as needed for mild pain 30 tablet 0    bimatoprost (LUMIGAN) 0 01 % ophthalmic drops Administer 1 drop to both eyes daily at bedtime      brimonidine-timolol (COMBIGAN) 0 2-0 5 % Administer 1 drop to the right eye every 12 (twelve) hours      docusate sodium (COLACE) 100 mg capsule Take 1 capsule (100 mg total) by mouth 2 (two) times a day 10 capsule 0    furosemide (LASIX) 40 mg tablet Take 40 mg by mouth      levothyroxine 75 mcg tablet Take 100 mcg by mouth daily      metoprolol tartrate (LOPRESSOR) 50 mg tablet Take 25 mg by mouth every 12 (twelve) hours      prednisoLONE acetate (PRED FORTE) 1 % ophthalmic suspension Administer 1 drop to both eyes 2 (two) times a day 5 mL 0     No current facility-administered medications for this visit       _______________________________________________________________________  Review of Systems   Constitutional: Negative for activity change  Respiratory: Positive for shortness of breath  Cardiovascular: Negative for chest pain  Psychiatric/Behavioral: Positive for confusion and decreased concentration  Objective: There were no vitals filed for this visit  There is no height or weight on file to calculate BMI       Physical Exam  Vitals signs reviewed  Constitutional:       General: She is not in acute distress  Appearance: Normal appearance  She is well-developed  She is obese  She is not ill-appearing  HENT:      Head: Atraumatic  Eyes:      General:         Right eye: No discharge  Left eye: No discharge  Neck:      Thyroid: No thyromegaly  Cardiovascular:      Rate and Rhythm: Normal rate  Pulses: Pulses are weak  Dorsalis pedis pulses are 1+ on the right side and 0 on the left side  Pulmonary:      Breath sounds: Normal breath sounds  No wheezing  Chest:      Chest wall: No tenderness  Abdominal:      General: Bowel sounds are normal       Palpations: Abdomen is soft  Tenderness: There is no abdominal tenderness  Feet:      Right foot:      Skin integrity: No ulcer, skin breakdown, erythema, warmth, callus or dry skin  Left foot:      Skin integrity: No ulcer, skin breakdown, erythema, warmth, callus or dry skin  Lymphadenopathy:      Cervical: No cervical adenopathy  Neurological:      Mental Status: Mental status is at baseline  She is disoriented  Psychiatric:         Mood and Affect: Mood normal          Behavior: Behavior normal        Patient's shoes and socks removed  Right Foot/Ankle   Right Foot Inspection  Skin Exam: skin normal and skin intact no dry skin, no warmth, no callus, no erythema, no maceration, no abnormal color, no pre-ulcer, no ulcer and no callus                          Toe Exam: ROM and strength within normal limits  Sensory       Monofilament testing: intact  Vascular  Capillary refills: < 3 seconds  The right DP pulse is 1+       Left Foot/Ankle  Left Foot Inspection  Skin Exam: skin normal and skin intactno dry skin, no warmth, no erythema, no maceration, normal color, no pre-ulcer, no ulcer and no callus                         Toe Exam: ROM and strength within normal limits                   Sensory       Monofilament: intact  Vascular  Capillary refills: < 3 seconds  The left DP pulse is 0  Assign Risk Category:  No deformity present;  No loss of protective sensation; Weak pulses       Risk: 0

## 2021-05-16 NOTE — ASSESSMENT & PLAN NOTE
Lab Results   Component Value Date    EGFR 47 08/10/2018    EGFR 41 08/09/2018    EGFR 51 08/08/2018    CREATININE 1 10 08/10/2018    CREATININE 1 24 08/09/2018    CREATININE 1 03 08/08/2018     Patient is under hospice care  No more blood work being done

## 2021-07-16 ENCOUNTER — NURSING HOME VISIT (OUTPATIENT)
Dept: FAMILY MEDICINE CLINIC | Facility: CLINIC | Age: 86
End: 2021-07-16
Payer: MEDICARE

## 2021-07-16 DIAGNOSIS — I50.89 OTHER HEART FAILURE (HCC): ICD-10-CM

## 2021-07-16 DIAGNOSIS — J96.12 CHRONIC RESPIRATORY FAILURE WITH HYPOXIA AND HYPERCAPNIA (HCC): Primary | ICD-10-CM

## 2021-07-16 DIAGNOSIS — J96.11 CHRONIC RESPIRATORY FAILURE WITH HYPOXIA AND HYPERCAPNIA (HCC): Primary | ICD-10-CM

## 2021-07-16 PROCEDURE — 99307 SBSQ NF CARE SF MDM 10: CPT | Performed by: INTERNAL MEDICINE

## 2021-07-19 NOTE — PROGRESS NOTES
Assessment/Plan:         Problem List Items Addressed This Visit        Respiratory    Chronic respiratory failure with hypoxia and hypercapnia (HCC) - Primary      Oxygen level is stable  Continue current oxygen  Cardiovascular and Mediastinum    Other heart failure (HCC)            Subjective:      Patient ID: Oksana Tan is a 81 y o  female  1   Chronic dyspnea due to respiratory failure  She has been on oxygen and nebulizer  Offers no new complaint  No in distress  No change in the cough  Patient is a poor historian  Most history was obtained from the nursing staff      The following portions of the patient's history were reviewed and updated as appropriate:   Past Medical History:  She has a past medical history of Acquired hypothyroidism (2020), Adult failure to thrive, Anemia, Chronic kidney disease, Chronic respiratory failure with hypoxia and hypercapnia ([facility]) (2020), Diabetes ([facility]), Diabetes mellitus ([facility]), Disease of thyroid gland, Dysphagia, Gastroesophageal reflux disease without esophagitis (2020), GI bleed, Heart failure ([facility]), History of transfusion, Hyperlipidemia, Hypertension, Hypothyroidism, unspecified, Kidney stone, Other heart failure ([facility]) (2020), Other specified glaucoma (2020), PVD (peripheral vascular disease) ([facility]), Respiratory failure, unspecified, unspecified whether with hypoxia or hypercapnia ([facility]), Sacral decubitus ulcer, and Slow transit constipation (2020)  ,  _______________________________________________________________________  Medical Problems:  does not have any pertinent problems on file ,  _______________________________________________________________________  Past Surgical History:   has a past surgical history that includes Cataract extraction; Cholecystectomy;   section; Esophagogastroduodenoscopy (N/A, 8/3/2018); and IR PICC placement single lumen (3/14/2019)  ,  _______________________________________________________________________  Family History:  family history is not on file ,  _______________________________________________________________________  Social History:   reports that she has never smoked  She has never used smokeless tobacco  She reports that she does not drink alcohol and does not use drugs  ,  _______________________________________________________________________  Allergies:  has No Known Allergies     _______________________________________________________________________  Current Outpatient Medications   Medication Sig Dispense Refill    acetaminophen (TYLENOL) 325 mg tablet Take 2 tablets (650 mg total) by mouth every 6 (six) hours as needed for mild pain 30 tablet 0    bimatoprost (LUMIGAN) 0 01 % ophthalmic drops Administer 1 drop to both eyes daily at bedtime      brimonidine-timolol (COMBIGAN) 0 2-0 5 % Administer 1 drop to the right eye every 12 (twelve) hours      docusate sodium (COLACE) 100 mg capsule Take 1 capsule (100 mg total) by mouth 2 (two) times a day 10 capsule 0    furosemide (LASIX) 40 mg tablet Take 40 mg by mouth      levothyroxine 75 mcg tablet Take 100 mcg by mouth daily      metoprolol tartrate (LOPRESSOR) 50 mg tablet Take 25 mg by mouth every 12 (twelve) hours      prednisoLONE acetate (PRED FORTE) 1 % ophthalmic suspension Administer 1 drop to both eyes 2 (two) times a day 5 mL 0     No current facility-administered medications for this visit      _______________________________________________________________________  Review of Systems      Objective: There were no vitals filed for this visit  There is no height or weight on file to calculate BMI  Physical Exam  Vitals and nursing note reviewed  Constitutional:       General: She is not in acute distress  Appearance: Normal appearance  She is well-developed  She is not ill-appearing  HENT:      Head: Atraumatic     Eyes:      General: Right eye: No discharge  Left eye: No discharge  Neck:      Thyroid: No thyromegaly  Cardiovascular:      Rate and Rhythm: Normal rate and regular rhythm  Pulmonary:      Effort: Pulmonary effort is normal       Breath sounds: No wheezing  Comments: Reduced bs  Chest:      Chest wall: No tenderness  Abdominal:      General: Bowel sounds are normal       Palpations: Abdomen is soft  Tenderness: There is no abdominal tenderness  Musculoskeletal:      Right lower leg: Edema present  Left lower leg: Edema (trace) present  Lymphadenopathy:      Cervical: No cervical adenopathy  Neurological:      Mental Status: Mental status is at baseline     Psychiatric:         Mood and Affect: Mood normal          Behavior: Behavior normal

## 2021-09-10 ENCOUNTER — NURSING HOME VISIT (OUTPATIENT)
Dept: FAMILY MEDICINE CLINIC | Facility: CLINIC | Age: 86
End: 2021-09-10
Payer: COMMERCIAL

## 2021-09-10 DIAGNOSIS — E11.42 TYPE 2 DIABETES MELLITUS WITH DIABETIC POLYNEUROPATHY, WITHOUT LONG-TERM CURRENT USE OF INSULIN (HCC): ICD-10-CM

## 2021-09-10 DIAGNOSIS — J96.12 CHRONIC RESPIRATORY FAILURE WITH HYPOXIA AND HYPERCAPNIA (HCC): ICD-10-CM

## 2021-09-10 DIAGNOSIS — K59.01 SLOW TRANSIT CONSTIPATION: Primary | ICD-10-CM

## 2021-09-10 DIAGNOSIS — J96.11 CHRONIC RESPIRATORY FAILURE WITH HYPOXIA AND HYPERCAPNIA (HCC): ICD-10-CM

## 2021-09-10 PROCEDURE — 99307 SBSQ NF CARE SF MDM 10: CPT | Performed by: INTERNAL MEDICINE

## 2021-09-12 VITALS — SYSTOLIC BLOOD PRESSURE: 133 MMHG | DIASTOLIC BLOOD PRESSURE: 78 MMHG

## 2021-09-12 NOTE — ASSESSMENT & PLAN NOTE
Lab Results   Component Value Date    HGBA1C 7 6 (H) 08/02/2018    patient under hospice care  Avoid any tight blood sugar control

## 2021-09-12 NOTE — PROGRESS NOTES
Assessment/Plan:         Problem List Items Addressed This Visit        Digestive    Slow transit constipation - Primary       Has been under control  Tolerating medicines well  No abdominal pain            Endocrine    Type 2 diabetes mellitus with diabetic polyneuropathy, without long-term current use of insulin ([facility] [address] )       Lab Results   Component Value Date    HGBA1C 7 6 (H) 08/02/2018    patient under hospice care  Avoid any tight blood sugar control  Respiratory    Chronic respiratory failure with hypoxia and hypercapnia (HCC)       Continue oxygen  Breathing is not any worse                 Subjective:      Patient ID: Oliver Chino is a 81 y o  female  [facility]  1   Chronic respiratory here  Patient denies any increased dyspnea  No increased cough or mucus  No fever or chills  No chest pain  No distress  2   Chronic constipation  No abdominal pain nausea or vomiting  No rectal pain  No fever or chills      The following portions of the patient's history were reviewed and updated as appropriate:   Past Medical History:  She has a past medical history of Acquired hypothyroidism (8/27/2020), Adult failure to thrive, Anemia, Chronic kidney disease, Chronic respiratory failure with hypoxia and hypercapnia ([facility] [address] ) (8/27/2020), Diabetes ([facility] [address] Diabetes mellitus ([facility] [address] ), Disease of thyroid gland, Dysphagia, Gastroesophageal reflux disease without esophagitis (8/27/2020), GI bleed, Heart failure ([facility] [address] ), History of transfusion, Hyperlipidemia, Hypertension, Hypothyroidism, unspecified, Kidney stone, Other heart failure ([facility] [address] ) (8/27/2020), Other specified glaucoma (8/27/2020), PVD (peripheral vascular disease) ([facility] [address] ), Respiratory failure, unspecified, unspecified whether with hypoxia or [facility] Sacral decubitus ulcer, and Slow transit constipation (8/27/2020)  ,  _______________________________________________________________________  Medical Problems:  does not have any pertinent problems on file ,  _______________________________________________________________________  Past Surgical History:   has a past surgical history that includes Cataract extraction; Cholecystectomy;  section; Esophagogastroduodenoscopy (N/A, 8/3/2018); and IR PICC placement single lumen (3/14/2019)  ,  _______________________________________________________________________  Family History:  family history is not on file ,  _______________________________________________________________________  Social History:   reports that she has never smoked  She has never used smokeless tobacco  She reports that she does not drink alcohol and does not use drugs  ,  _______________________________________________________________________  Allergies:  has No Known Allergies     _______________________________________________________________________  Current Outpatient Medications   Medication Sig Dispense Refill    acetaminophen (TYLENOL) 325 mg tablet Take 2 tablets (650 mg total) by mouth every 6 (six) hours as needed for mild pain 30 tablet 0    bimatoprost (LUMIGAN) 0 01 % ophthalmic drops Administer 1 drop to both eyes daily at bedtime      brimonidine-timolol (COMBIGAN) 0 2-0 5 % Administer 1 drop to the right eye every 12 (twelve) hours      docusate sodium (COLACE) 100 mg capsule Take 1 capsule (100 mg total) by mouth 2 (two) times a day 10 capsule 0    furosemide (LASIX) 40 mg tablet Take 40 mg by mouth      levothyroxine 75 mcg tablet Take 100 mcg by mouth daily      metoprolol tartrate (LOPRESSOR) 50 mg tablet Take 25 mg by mouth every 12 (twelve) hours      prednisoLONE acetate (PRED FORTE) 1 % ophthalmic suspension Administer 1 drop to both eyes 2 (two) times a day 5 mL 0     No current facility-administered medications for this visit      _______________________________________________________________________  Review of Systems      Objective:  Vitals:    21 193   BP: 133/78 There is no height or weight on file to calculate BMI  Physical Exam  Constitutional:       Appearance: Normal appearance  She is obese  She is not ill-appearing or diaphoretic  Cardiovascular:      Rate and Rhythm: Regular rhythm  Pulmonary:      Comments: Reduced breath sound bilateral  Abdominal:      Palpations: Abdomen is soft  Tenderness: There is no abdominal tenderness  Musculoskeletal:      Right lower leg: Edema present  Left lower leg: Edema (mild) present  Neurological:      Mental Status: Mental status is at baseline     Psychiatric:         Behavior: Behavior normal

## 2021-11-12 ENCOUNTER — NURSING HOME VISIT (OUTPATIENT)
Dept: FAMILY MEDICINE CLINIC | Facility: CLINIC | Age: 86
End: 2021-11-12
Payer: COMMERCIAL

## 2021-11-12 DIAGNOSIS — J96.11 CHRONIC RESPIRATORY FAILURE WITH HYPOXIA AND HYPERCAPNIA (HCC): ICD-10-CM

## 2021-11-12 DIAGNOSIS — E11.42 TYPE 2 DIABETES MELLITUS WITH DIABETIC POLYNEUROPATHY, WITHOUT LONG-TERM CURRENT USE OF INSULIN (HCC): Primary | ICD-10-CM

## 2021-11-12 DIAGNOSIS — J96.12 CHRONIC RESPIRATORY FAILURE WITH HYPOXIA AND HYPERCAPNIA (HCC): ICD-10-CM

## 2021-11-12 PROCEDURE — 99308 SBSQ NF CARE LOW MDM 20: CPT | Performed by: INTERNAL MEDICINE

## 2021-11-15 VITALS — DIASTOLIC BLOOD PRESSURE: 86 MMHG | SYSTOLIC BLOOD PRESSURE: 125 MMHG

## 2022-01-31 ENCOUNTER — NURSING HOME VISIT (OUTPATIENT)
Dept: FAMILY MEDICINE CLINIC | Facility: CLINIC | Age: 87
End: 2022-01-31
Payer: COMMERCIAL

## 2022-01-31 DIAGNOSIS — J96.11 CHRONIC RESPIRATORY FAILURE WITH HYPOXIA AND HYPERCAPNIA (HCC): ICD-10-CM

## 2022-01-31 DIAGNOSIS — J96.12 CHRONIC RESPIRATORY FAILURE WITH HYPOXIA AND HYPERCAPNIA (HCC): ICD-10-CM

## 2022-01-31 DIAGNOSIS — E03.9 ACQUIRED HYPOTHYROIDISM: Primary | ICD-10-CM

## 2022-01-31 PROCEDURE — 99307 SBSQ NF CARE SF MDM 10: CPT | Performed by: INTERNAL MEDICINE

## 2022-01-31 NOTE — PROGRESS NOTES
Assessment/Plan:         Problem List Items Addressed This Visit        Endocrine    Acquired hypothyroidism - Primary     Continue levothyroxine  No new event as per nursing staff  Respiratory    Chronic respiratory failure with hypoxia and hypercapnia (HCC)     Respiratory function stable  Oxygen level is stable  Discussed with staff nurse  Continue same oxygen  Subjective:      Patient ID: Pat Main is a 81 y o  female  Nursing Home Visit [facility]  AND [facility]  Was seen on 2022  1  Chronic respiratory failure  Patient has been on continuous oxygen  No respiratory distress  No increased cough  No chest pain  No distress  No fever or chills  The following portions of the patient's history were reviewed and updated as appropriate:   Past Medical History:  She has a past medical history of Acquired hypothyroidism (2020), Adult failure to thrive, Anemia, Chronic kidney disease, Chronic respiratory failure with hypoxia and hypercapnia ([facility] [address] ) (2020), Diabetes ([facility] [address] ), Diabetes mellitus ([facility] [address] ), Disease of thyroid gland, Dysphagia, Gastroesophageal reflux disease without esophagitis (2020), GI bleed, Heart failure ([facility] [address] ), History of transfusion, Hyperlipidemia, Hypertension, Hypothyroidism, unspecified, Kidney stone, Other heart failure ([facility] [address] ) (2020), Other specified glaucoma (2020), PVD (peripheral vascular disease) ([facility] [address] ), Respiratory failure, unspecified, unspecified whether with hypoxia or hypercapnia ([facility] [address] ), Sacral decubitus ulcer, and Slow transit constipation (2020)  ,  _______________________________________________________________________  Medical Problems:  does not have any pertinent problems on file ,  _______________________________________________________________________  Past Surgical History:   has a past surgical history that includes Cataract extraction; Cholecystectomy;   section; Esophagogastroduodenoscopy (N/A, 8/3/2018); and IR PICC placement single lumen (3/14/2019)  ,  _______________________________________________________________________  Family History:  family history is not on file ,  _______________________________________________________________________  Social History:   reports that she has never smoked  She has never used smokeless tobacco  She reports that she does not drink alcohol and does not use drugs  ,  _______________________________________________________________________  Allergies:  has No Known Allergies     _______________________________________________________________________  Current Outpatient Medications   Medication Sig Dispense Refill    acetaminophen (TYLENOL) 325 mg tablet Take 2 tablets (650 mg total) by mouth every 6 (six) hours as needed for mild pain 30 tablet 0    bimatoprost (LUMIGAN) 0 01 % ophthalmic drops Administer 1 drop to both eyes daily at bedtime      brimonidine-timolol (COMBIGAN) 0 2-0 5 % Administer 1 drop to the right eye every 12 (twelve) hours      docusate sodium (COLACE) 100 mg capsule Take 1 capsule (100 mg total) by mouth 2 (two) times a day 10 capsule 0    furosemide (LASIX) 40 mg tablet Take 40 mg by mouth      levothyroxine 75 mcg tablet Take 100 mcg by mouth daily      metoprolol tartrate (LOPRESSOR) 50 mg tablet Take 25 mg by mouth every 12 (twelve) hours      prednisoLONE acetate (PRED FORTE) 1 % ophthalmic suspension Administer 1 drop to both eyes 2 (two) times a day 5 mL 0     No current facility-administered medications for this visit      _______________________________________________________________________  Review of Systems      Objective: There were no vitals filed for this visit  There is no height or weight on file to calculate BMI  Physical Exam  Constitutional:       General: She is not in acute distress  Appearance: She is obese  She is not ill-appearing or diaphoretic  Cardiovascular:      Rate and Rhythm: Regular rhythm  Pulmonary:      Breath sounds: No wheezing or rhonchi  Abdominal:      Palpations: Abdomen is soft  Tenderness: There is no abdominal tenderness  Musculoskeletal:      Right lower leg: Edema (trace) present  Left lower leg: Edema (trace) present  Neurological:      Mental Status: Mental status is at baseline     Psychiatric:         Behavior: Behavior normal

## 2022-01-31 NOTE — ASSESSMENT & PLAN NOTE
Respiratory function stable  Oxygen level is stable  Discussed with staff nurse  Continue same oxygen

## 2022-04-08 ENCOUNTER — NURSING HOME VISIT (OUTPATIENT)
Dept: FAMILY MEDICINE CLINIC | Facility: CLINIC | Age: 87
End: 2022-04-08
Payer: COMMERCIAL

## 2022-04-08 DIAGNOSIS — J96.11 CHRONIC RESPIRATORY FAILURE WITH HYPOXIA AND HYPERCAPNIA (HCC): Primary | ICD-10-CM

## 2022-04-08 DIAGNOSIS — J96.12 CHRONIC RESPIRATORY FAILURE WITH HYPOXIA AND HYPERCAPNIA (HCC): Primary | ICD-10-CM

## 2022-04-08 PROCEDURE — 99307 SBSQ NF CARE SF MDM 10: CPT | Performed by: INTERNAL MEDICINE

## 2022-04-08 NOTE — PROGRESS NOTES
Assessment/Plan:         Problem List Items Addressed This Visit        Respiratory    Chronic respiratory failure with hypoxia and hypercapnia ([facility] ) - Primary     Patient was seen and examined with the staff nurse  No new event  Patient offers no new complaint  Continue oxygen  Avoid any tight blood sugar control  Subjective:      Patient ID: Marino Garcia is a 81 y o  female  Nurse  home visit on Friday a week ago  1  Chronic respiratory failure  Continue oxygen  Oxygen saturation stable as per nursing staff  No increased dyspnea or increased cough  No fever or chills  No pain or distress  No new event      The following portions of the patient's history were reviewed and updated as appropriate:   Past Medical History:  She has a past medical history of Acquired hypothyroidism (2020), Adult failure to thrive, Anemia, Chronic kidney disease, Chronic respiratory failure with hypoxia and hypercapnia ([facility] ) (2020), Diabetes ([facility] ), Diabetes mellitus ([place] [address] ), Disease of thyroid gland, Dysphagia, Gastroesophageal reflux disease without esophagitis (2020), GI bleed, Heart failure ([place] [address] ), History of transfusion, Hyperlipidemia, Hypertension, Hypothyroidism, unspecified, Kidney stone, Other heart failure ([facility] ) (2020), Other specified glaucoma (2020), PVD (peripheral vascular disease) ([place] [address] ), Respiratory failure, unspecified, unspecified whether with hypoxia or hypercapnia ([facility] ), Sacral decubitus ulcer, and Slow transit constipation (2020)  ,  _______________________________________________________________________  Medical Problems:  does not have any pertinent problems on file ,  _______________________________________________________________________  Past Surgical History:   has a past surgical history that includes Cataract extraction; Cholecystectomy;   section; Esophagogastroduodenoscopy (N/A, 8/3/2018); and IR PICC placement single lumen (3/14/2019)  ,  _______________________________________________________________________  Family History:  family history is not on file ,  _______________________________________________________________________  Social History:   reports that she has never smoked  She has never used smokeless tobacco  She reports that she does not drink alcohol and does not use drugs  ,  _______________________________________________________________________  Allergies:  has No Known Allergies     _______________________________________________________________________  Current Outpatient Medications   Medication Sig Dispense Refill    acetaminophen (TYLENOL) 325 mg tablet Take 2 tablets (650 mg total) by mouth every 6 (six) hours as needed for mild pain 30 tablet 0    bimatoprost (LUMIGAN) 0 01 % ophthalmic drops Administer 1 drop to both eyes daily at bedtime      brimonidine-timolol (COMBIGAN) 0 2-0 5 % Administer 1 drop to the right eye every 12 (twelve) hours      docusate sodium (COLACE) 100 mg capsule Take 1 capsule (100 mg total) by mouth 2 (two) times a day 10 capsule 0    furosemide (LASIX) 40 mg tablet Take 40 mg by mouth      levothyroxine 75 mcg tablet Take 100 mcg by mouth daily      metoprolol tartrate (LOPRESSOR) 50 mg tablet Take 25 mg by mouth every 12 (twelve) hours      prednisoLONE acetate (PRED FORTE) 1 % ophthalmic suspension Administer 1 drop to both eyes 2 (two) times a day 5 mL 0     No current facility-administered medications for this visit      _______________________________________________________________________  Review of Systems      Objective: There were no vitals filed for this visit  There is no height or weight on file to calculate BMI  Physical Exam  Constitutional:       General: She is not in acute distress  Appearance: She is not toxic-appearing or diaphoretic  Cardiovascular:      Rate and Rhythm: Regular rhythm  Pulmonary:      Breath sounds: No wheezing or rhonchi  Musculoskeletal:      Right lower leg: Edema present  Left lower leg: Edema present  Neurological:      Mental Status: Mental status is at baseline     Psychiatric:         Behavior: Behavior normal

## 2022-04-08 NOTE — ASSESSMENT & PLAN NOTE
Patient was seen and examined with the staff nurse  No new event  Patient offers no new complaint  Continue oxygen  Avoid any tight blood sugar control

## 2022-09-23 ENCOUNTER — NURSING HOME VISIT (OUTPATIENT)
Dept: FAMILY MEDICINE CLINIC | Facility: CLINIC | Age: 87
End: 2022-09-23
Payer: COMMERCIAL

## 2022-09-23 DIAGNOSIS — J96.11 CHRONIC RESPIRATORY FAILURE WITH HYPOXIA AND HYPERCAPNIA (HCC): Primary | ICD-10-CM

## 2022-09-23 DIAGNOSIS — J96.12 CHRONIC RESPIRATORY FAILURE WITH HYPOXIA AND HYPERCAPNIA (HCC): Primary | ICD-10-CM

## 2022-09-23 PROCEDURE — 99307 SBSQ NF CARE SF MDM 10: CPT | Performed by: INTERNAL MEDICINE

## 2022-09-23 NOTE — PROGRESS NOTES
Name: Ralf Ellis      : 1935      MRN: [id number]  Encounter Provider: Veronica Xie MD  Encounter Date: 2022   Encounter department: [address] [place] [address]  Chronic respiratory failure with hypoxia and hypercapnia (HCC)  Assessment & Plan:  Respiratory function stable  Discussed with staff nurse           Subjective      1  Chronic respiratory failure  Discussed with the staff nurse  No new event  Oxygen saturation in mid 90s  No increased wheezing or cough  No fever or chills  No distress  No pain  Review of Systems    Current Outpatient Medications on File Prior to Visit   Medication Sig    acetaminophen (TYLENOL) 325 mg tablet Take 2 tablets (650 mg total) by mouth every 6 (six) hours as needed for mild pain    bimatoprost (LUMIGAN) 0 01 % ophthalmic drops Administer 1 drop to both eyes daily at bedtime    brimonidine-timolol (COMBIGAN) 0 2-0 5 % Administer 1 drop to the right eye every 12 (twelve) hours    docusate sodium (COLACE) 100 mg capsule Take 1 capsule (100 mg total) by mouth 2 (two) times a day    furosemide (LASIX) 40 mg tablet Take 40 mg by mouth    levothyroxine 75 mcg tablet Take 100 mcg by mouth daily    metoprolol tartrate (LOPRESSOR) 50 mg tablet Take 25 mg by mouth every 12 (twelve) hours    prednisoLONE acetate (PRED FORTE) 1 % ophthalmic suspension Administer 1 drop to both eyes 2 (two) times a day       Objective     There were no vitals taken for this visit  Physical Exam  Constitutional:       General: She is not in acute distress  Appearance: She is not ill-appearing  Cardiovascular:      Heart sounds: Normal heart sounds  Pulmonary:      Breath sounds: No wheezing  Abdominal:      Palpations: Abdomen is soft  Musculoskeletal:      Right lower leg: Edema present  Left lower leg: Edema present  Neurological:      Mental Status: Mental status is at baseline     Psychiatric:         Behavior: Behavior normal        Bonita Howe MD

## 2022-11-18 ENCOUNTER — NURSING HOME VISIT (OUTPATIENT)
Dept: FAMILY MEDICINE CLINIC | Facility: CLINIC | Age: 87
End: 2022-11-18

## 2022-11-18 VITALS — HEART RATE: 78 BPM | DIASTOLIC BLOOD PRESSURE: 80 MMHG | SYSTOLIC BLOOD PRESSURE: 134 MMHG

## 2022-11-18 DIAGNOSIS — E03.9 ACQUIRED HYPOTHYROIDISM: Primary | ICD-10-CM

## 2022-11-18 DIAGNOSIS — J96.11 CHRONIC RESPIRATORY FAILURE WITH HYPOXIA AND HYPERCAPNIA (HCC): ICD-10-CM

## 2022-11-18 DIAGNOSIS — J96.12 CHRONIC RESPIRATORY FAILURE WITH HYPOXIA AND HYPERCAPNIA (HCC): ICD-10-CM

## 2022-11-18 NOTE — PROGRESS NOTES
Name: Laura Costa      : 1935      MRN: [id number]  Encounter Provider: Lexa Coats MD  Encounter Date: 2022   Encounter department: [address] [place] [address]  Acquired hypothyroidism  Assessment & Plan:  Continue levothyroxine  Patient was seen with staff nurse  No new event  Gradually declining  2  Chronic respiratory failure with hypoxia and hypercapnia (HCC)  Assessment & Plan:  Oxygen level stable  No respiratory distress  No fever  Subjective      [facility] medical [facility] visit  Was seen with staff nurse on   Chronic respiratory failure  Patient is on continuous oxygen  No increased dyspnea  No fever or chills  No chest pain or distress  Oxygen saturation is around 92-95%  No increased edema  No other new events  No hallucinations  No agitation    Review of Systems    Current Outpatient Medications on File Prior to Visit   Medication Sig   • acetaminophen (TYLENOL) 325 mg tablet Take 2 tablets (650 mg total) by mouth every 6 (six) hours as needed for mild pain   • bimatoprost (LUMIGAN) 0 01 % ophthalmic drops Administer 1 drop to both eyes daily at bedtime   • brimonidine-timolol (COMBIGAN) 0 2-0 5 % Administer 1 drop to the right eye every 12 (twelve) hours   • docusate sodium (COLACE) 100 mg capsule Take 1 capsule (100 mg total) by mouth 2 (two) times a day   • furosemide (LASIX) 40 mg tablet Take 40 mg by mouth   • levothyroxine 75 mcg tablet Take 100 mcg by mouth daily   • metoprolol tartrate (LOPRESSOR) 50 mg tablet Take 25 mg by mouth every 12 (twelve) hours   • prednisoLONE acetate (PRED FORTE) 1 % ophthalmic suspension Administer 1 drop to both eyes 2 (two) times a day       Objective     /80   Pulse 78     Physical Exam  Constitutional:       General: She is not in acute distress  Appearance: She is not toxic-appearing  Cardiovascular:      Rate and Rhythm: Normal rate        Heart sounds: Normal heart sounds  Pulmonary:      Breath sounds: No wheezing or rhonchi  Abdominal:      Palpations: Abdomen is soft  Tenderness: There is no abdominal tenderness  Musculoskeletal:      Cervical back: Neck supple  Right lower leg: Edema present  Left lower leg: Edema (trace) present  Neurological:      Mental Status: Mental status is at baseline     Psychiatric:         Behavior: Behavior normal        Eric Santacruz MD

## 2023-01-13 ENCOUNTER — NURSING HOME VISIT (OUTPATIENT)
Dept: FAMILY MEDICINE CLINIC | Facility: CLINIC | Age: 88
End: 2023-01-13

## 2023-01-13 VITALS — DIASTOLIC BLOOD PRESSURE: 86 MMHG | SYSTOLIC BLOOD PRESSURE: 132 MMHG | HEART RATE: 88 BPM

## 2023-01-13 DIAGNOSIS — J96.12 CHRONIC RESPIRATORY FAILURE WITH HYPOXIA AND HYPERCAPNIA (HCC): ICD-10-CM

## 2023-01-13 DIAGNOSIS — I10 ESSENTIAL HYPERTENSION: Primary | ICD-10-CM

## 2023-01-13 DIAGNOSIS — J96.11 CHRONIC RESPIRATORY FAILURE WITH HYPOXIA AND HYPERCAPNIA (HCC): ICD-10-CM

## 2023-01-13 NOTE — PROGRESS NOTES
Name: Patt Forrest      : 1935      MRN: [id number]  Encounter Provider: Eric Santacruz MD  Encounter Date: 2023   Encounter department: [address] [place] [address]  Essential hypertension  Assessment & Plan:  Blood pressure is stable  2  Chronic respiratory failure with hypoxia and hypercapnia (HCC)  Assessment & Plan:  Respiratory function is stable  Discussed with the staff nurse  No new event  Subjective      [facility]  Seen with staff nurse  Few days ago  1   Chronic respiratory failure  History of hypertension  Patient is laying in the bed comfortably  No distress  She offers no new complaint  She is a poor historian  Most information was obtained from the nursing staff  No new event  No open skin areas according to staff  No pain or vomiting  No fever or chills    Review of Systems    Current Outpatient Medications on File Prior to Visit   Medication Sig   • acetaminophen (TYLENOL) 325 mg tablet Take 2 tablets (650 mg total) by mouth every 6 (six) hours as needed for mild pain   • bimatoprost (LUMIGAN) 0 01 % ophthalmic drops Administer 1 drop to both eyes daily at bedtime   • brimonidine-timolol (COMBIGAN) 0 2-0 5 % Administer 1 drop to the right eye every 12 (twelve) hours   • docusate sodium (COLACE) 100 mg capsule Take 1 capsule (100 mg total) by mouth 2 (two) times a day   • furosemide (LASIX) 40 mg tablet Take 40 mg by mouth   • levothyroxine 75 mcg tablet Take 100 mcg by mouth daily   • metoprolol tartrate (LOPRESSOR) 50 mg tablet Take 25 mg by mouth every 12 (twelve) hours   • prednisoLONE acetate (PRED FORTE) 1 % ophthalmic suspension Administer 1 drop to both eyes 2 (two) times a day       Objective     /86   Pulse 88     Physical Exam  Vitals and nursing note reviewed  Constitutional:       Appearance: She is well-developed  She is obese  HENT:      Head: Atraumatic     Eyes:      General: Right eye: No discharge  Left eye: No discharge  Neck:      Thyroid: No thyromegaly  Cardiovascular:      Rate and Rhythm: Normal rate and regular rhythm  Pulmonary:      Breath sounds: No wheezing  Chest:      Chest wall: No tenderness  Abdominal:      General: Bowel sounds are normal       Palpations: Abdomen is soft  Tenderness: There is no abdominal tenderness  Musculoskeletal:         General: No tenderness  Cervical back: Neck supple  Right lower leg: No edema  Left lower leg: No edema  Lymphadenopathy:      Cervical: No cervical adenopathy  Skin:     Findings: No erythema  Neurological:      Mental Status: She is alert  Mental status is at baseline     Psychiatric:         Behavior: Behavior normal          Judgment: Judgment normal        Marely Hilliard MD

## (undated) DEVICE — WORKING LENGTH 235CM, WORKING CHANNEL 2.8MM: Brand: RESOLUTION 360 CLIP

## (undated) DEVICE — NEEDLE SCLERO INTERJECT 25G 240MM